# Patient Record
Sex: FEMALE | Race: BLACK OR AFRICAN AMERICAN | URBAN - METROPOLITAN AREA
[De-identification: names, ages, dates, MRNs, and addresses within clinical notes are randomized per-mention and may not be internally consistent; named-entity substitution may affect disease eponyms.]

---

## 2018-08-16 LAB
CREATININE, EXTERNAL: 0.85
HBA1C MFR BLD HPLC: 5.8 %
LDL-C, EXTERNAL: 114
MICROALBUMIN UR TEST STR-MCNC: 11.9 MG/DL

## 2020-12-21 ENCOUNTER — OFFICE VISIT (OUTPATIENT)
Dept: ENDOCRINOLOGY | Age: 65
End: 2020-12-21
Payer: MEDICARE

## 2020-12-21 VITALS
TEMPERATURE: 98.4 F | HEIGHT: 64 IN | RESPIRATION RATE: 14 BRPM | SYSTOLIC BLOOD PRESSURE: 131 MMHG | WEIGHT: 218.3 LBS | OXYGEN SATURATION: 94 % | DIASTOLIC BLOOD PRESSURE: 70 MMHG | BODY MASS INDEX: 37.27 KG/M2 | HEART RATE: 56 BPM

## 2020-12-21 DIAGNOSIS — E66.9 NON MORBID OBESITY: ICD-10-CM

## 2020-12-21 DIAGNOSIS — E89.0 POSTABLATIVE HYPOTHYROIDISM: Primary | ICD-10-CM

## 2020-12-21 PROCEDURE — 99204 OFFICE O/P NEW MOD 45 MIN: CPT | Performed by: INTERNAL MEDICINE

## 2020-12-21 PROCEDURE — 3017F COLORECTAL CA SCREEN DOC REV: CPT | Performed by: INTERNAL MEDICINE

## 2020-12-21 PROCEDURE — G8400 PT W/DXA NO RESULTS DOC: HCPCS | Performed by: INTERNAL MEDICINE

## 2020-12-21 PROCEDURE — 1090F PRES/ABSN URINE INCON ASSESS: CPT | Performed by: INTERNAL MEDICINE

## 2020-12-21 PROCEDURE — 1101F PT FALLS ASSESS-DOCD LE1/YR: CPT | Performed by: INTERNAL MEDICINE

## 2020-12-21 PROCEDURE — G8417 CALC BMI ABV UP PARAM F/U: HCPCS | Performed by: INTERNAL MEDICINE

## 2020-12-21 PROCEDURE — G8427 DOCREV CUR MEDS BY ELIG CLIN: HCPCS | Performed by: INTERNAL MEDICINE

## 2020-12-21 PROCEDURE — G8432 DEP SCR NOT DOC, RNG: HCPCS | Performed by: INTERNAL MEDICINE

## 2020-12-21 PROCEDURE — G8536 NO DOC ELDER MAL SCRN: HCPCS | Performed by: INTERNAL MEDICINE

## 2020-12-21 RX ORDER — METFORMIN HYDROCHLORIDE 500 MG/1
500 TABLET ORAL
COMMUNITY

## 2020-12-21 RX ORDER — LEVOTHYROXINE SODIUM 175 UG/1
175 TABLET ORAL
Qty: 90 TAB | Refills: 3 | Status: SHIPPED | OUTPATIENT
Start: 2020-12-21 | End: 2021-03-24

## 2020-12-21 RX ORDER — LEVOTHYROXINE SODIUM 175 UG/1
175 TABLET ORAL
COMMUNITY
End: 2020-12-21 | Stop reason: ALTCHOICE

## 2020-12-21 RX ORDER — TELMISARTAN AND HYDROCHLORTHIAZIDE 40; 12.5 MG/1; MG/1
1 TABLET ORAL DAILY
COMMUNITY

## 2020-12-21 RX ORDER — LEVOTHYROXINE SODIUM 125 UG/1
TABLET ORAL
COMMUNITY
Start: 2020-11-14 | End: 2020-12-21

## 2020-12-21 NOTE — PROGRESS NOTES
Matthew Carbone MD         Patient Information  Date:12/21/2020  Name : Samara Funez 76 y.o.     YOB: 1955         Referred by: UNKNOWN         History of present illness    Samara Funez is a 76 y.o. female  here for evaluation of thyroid. She was diagnosed with Graves' disease status post radioactive iodine therapy several years ago followed by post ablative hypothyroidism  She was on Synthroid and due to the insurance coverage switched to levothyroxine  She has not taken the medication consistently, now on levothyroxine. TSH in the past was more than 20  Reported weight gain and some tiredness  Sister is a patient of mine        No change in the size of the neck or neck pain. No dysphagia,dysphonia or dyspnea. Wt Readings from Last 3 Encounters:   12/21/20 218 lb 4.8 oz (99 kg)       Past Medical History:   Diagnosis Date    Graves disease        Current Outpatient Medications   Medication Sig    levothyroxine (SYNTHROID) 175 mcg tablet Take 175 mcg by mouth Daily (before breakfast).  telmisartan-hydroCHLOROthiazide (Micardis HCT) 40-12.5 mg per tablet Take 1 Tab by mouth daily.  metFORMIN (GLUCOPHAGE) 500 mg tablet Take 500 mg by mouth daily (with breakfast). No current facility-administered medications for this visit. Not on File      Review of Systems:  All 10 systems  reviewed and are negative other than mentioned in HPI    Physical Examination:  Blood pressure 131/70, pulse (!) 56, temperature 98.4 °F (36.9 °C), temperature source Oral, resp. rate 14, height 5' 3.5\" (1.613 m), weight 218 lb 4.8 oz (99 kg), SpO2 94 %.   - General: pleasant, no distress, good eye contact  - HEENT: no exopthalmos, no periorbital edema, no scleral/conjunctival injection, EOMI, no lid lag or stare  - Neck: supple, no thyromegaly, no nodules,no lymphadenopathy  - Cardiovascular: regular, normal rate, normal S1 and S2,  - Respiratory: clear to auscultation bilaterally  - Gastrointestinal: soft, nontender, nondistended, BS +  - Musculoskeletal: no proximal muscle weakness in upper or lower extremities  - Integumentary: no tremors, no edema,  - Neurological:alert and oriented   - Psychiatric: normal mood and affect    Data Reviewed:     [] Reviewed labs      Assessment/Plan:     1. Postablative hypothyroidism        Primary hypothyroidism/post ablative hypothyroidism  History of Graves' disease  She is dependent on the medicine completely, switch to brand name Unithroid  Good Rx coupons    Discussed the natural course of hypothyroidism and instructions for levothyroxine,compliance to keep the levels stable. Discussed about the various factors which can affect TSH including medications,weight change,illness, compliance and instructions to take LT4 by itself to keep the levels stable. Discussed various causes of weight gain ,association with other autoimmune conditions,stressed the importance of life style changes. Nonmorbid obesity: Portion control, low-carb diet    There are no Patient Instructions on file for this visit. Follow-up and Dispositions    · Return in about 3 months (around 3/21/2021) for labs before next visit and follow up. Patient /caregiver verbalized understanding   Voice-recognition software was used to generate this report, which may result in some phonetic-based errors in the grammar and contents. Even though attempts were made to correct all the mistakes, some may have been missed and remained in the body of the report.

## 2020-12-21 NOTE — PROGRESS NOTES
Glenna Sanders is a 76 y.o. female here for   Chief Complaint   Patient presents with    New Patient    Thyroid Problem       1. Have you been to the ER, urgent care clinic since your last visit? Hospitalized since your last visit? - ER for acid reflux    2. Have you seen or consulted any other health care providers outside of the [address] [place] [address] since your last visit?   Include any pap smears or colon screening- no trying to get records from [place]

## 2020-12-22 PROBLEM — E89.0 POSTABLATIVE HYPOTHYROIDISM: Status: ACTIVE | Noted: 2020-12-22

## 2020-12-22 PROBLEM — E66.9 NON MORBID OBESITY: Status: ACTIVE | Noted: 2020-12-22

## 2021-03-17 ENCOUNTER — HOSPITAL ENCOUNTER (OUTPATIENT)
Dept: LAB | Age: 66
Discharge: HOME | End: 2021-03-17
Payer: MEDICARE

## 2021-03-17 PROCEDURE — 36415 COLL VENOUS BLD VENIPUNCTURE: CPT

## 2021-03-17 PROCEDURE — 84443 ASSAY THYROID STIM HORMONE: CPT

## 2021-03-17 PROCEDURE — 84439 ASSAY OF FREE THYROXINE: CPT

## 2021-03-18 LAB
T4 FREE SERPL-MCNC: 1.9 NG/DL (ref 0.82–1.77)
TSH SERPL DL<=0.005 MIU/L-ACNC: 0.01 UIU/ML (ref 0.45–4.5)

## 2021-03-24 ENCOUNTER — OFFICE VISIT (OUTPATIENT)
Dept: ENDOCRINOLOGY | Age: 66
End: 2021-03-24
Payer: MEDICARE

## 2021-03-24 VITALS
HEART RATE: 58 BPM | RESPIRATION RATE: 18 BRPM | TEMPERATURE: 98.3 F | BODY MASS INDEX: 36.7 KG/M2 | WEIGHT: 215 LBS | OXYGEN SATURATION: 99 % | SYSTOLIC BLOOD PRESSURE: 137 MMHG | DIASTOLIC BLOOD PRESSURE: 55 MMHG | HEIGHT: 64 IN

## 2021-03-24 DIAGNOSIS — E89.0 POSTABLATIVE HYPOTHYROIDISM: ICD-10-CM

## 2021-03-24 DIAGNOSIS — E55.9 VITAMIN D DEFICIENCY: ICD-10-CM

## 2021-03-24 DIAGNOSIS — E89.0 POSTABLATIVE HYPOTHYROIDISM: Primary | ICD-10-CM

## 2021-03-24 PROCEDURE — G8427 DOCREV CUR MEDS BY ELIG CLIN: HCPCS | Performed by: INTERNAL MEDICINE

## 2021-03-24 PROCEDURE — 99214 OFFICE O/P EST MOD 30 MIN: CPT | Performed by: INTERNAL MEDICINE

## 2021-03-24 PROCEDURE — 1090F PRES/ABSN URINE INCON ASSESS: CPT | Performed by: INTERNAL MEDICINE

## 2021-03-24 PROCEDURE — 3017F COLORECTAL CA SCREEN DOC REV: CPT | Performed by: INTERNAL MEDICINE

## 2021-03-24 PROCEDURE — G8536 NO DOC ELDER MAL SCRN: HCPCS | Performed by: INTERNAL MEDICINE

## 2021-03-24 PROCEDURE — G8400 PT W/DXA NO RESULTS DOC: HCPCS | Performed by: INTERNAL MEDICINE

## 2021-03-24 PROCEDURE — G8417 CALC BMI ABV UP PARAM F/U: HCPCS | Performed by: INTERNAL MEDICINE

## 2021-03-24 PROCEDURE — 1101F PT FALLS ASSESS-DOCD LE1/YR: CPT | Performed by: INTERNAL MEDICINE

## 2021-03-24 PROCEDURE — G8510 SCR DEP NEG, NO PLAN REQD: HCPCS | Performed by: INTERNAL MEDICINE

## 2021-03-24 RX ORDER — LEVOTHYROXINE SODIUM 175 UG/1
175 TABLET ORAL
Qty: 90 TAB | Refills: 3 | Status: SHIPPED | OUTPATIENT
Start: 2021-03-24 | End: 2021-03-24 | Stop reason: SDUPTHER

## 2021-03-24 RX ORDER — BISMUTH SUBSALICYLATE 262 MG
1 TABLET,CHEWABLE ORAL DAILY
COMMUNITY

## 2021-03-24 RX ORDER — LEVOTHYROXINE SODIUM 175 UG/1
TABLET ORAL
Qty: 90 TAB | Refills: 3 | Status: SHIPPED | OUTPATIENT
Start: 2021-03-24 | End: 2022-01-26 | Stop reason: SDUPTHER

## 2021-03-24 RX ORDER — ACETAMINOPHEN 325 MG/1
325 TABLET ORAL
COMMUNITY

## 2021-03-24 NOTE — PATIENT INSTRUCTIONS
Daily requirement of calcium is 1000 mg  1200 mg and Vitamin D is 800  1000 Units daily (should  preferably be from food sources). Calcium rich food choices are  low fat dairy products, fortified orange juice,broccoli,salmon. Fall precautions Weight bearing exercises helps. Excess alcohol and smoking weakens the bone and hence should be avoided. Portion calcium (mg) # of servings per week Milk (skim, 2%, or whole) 1 cup 300 Kaplan Milk (Silk brand, Kaplan Breeze Brand)  1 cup 450 Soy milk 1 cup 300 Yogurt  1 cup 350 Tofu with calcium  ½ cup 435 Hard cheese (cheddar, parmesan, emmental, gruyere) 1 oz 240 Soft cheese (camembert, brie, mozzarella) 1 oz 120 Cottage cheese  ½ cup 130 Cream cheese 1oz 180 Calcium supplement or pill 1

## 2021-03-24 NOTE — PROGRESS NOTES
Makayla House MD         Patient Information  Date:3/24/2021  Name : Jenni Garcia 76 y.o.     YOB: 1955         Referred by: UNKNOWN         History of present illness    Jenni Garcia is a 76 y.o. female  here for follow up of thyroid. She was diagnosed with Graves' disease status post radioactive iodine therapy several years ago followed by post ablative hypothyroidism  She was on Synthroid and due to the insurance coverage switched to levothyroxine  She is now on Unithroid, taking it consistently  Labs are normal  She has lost 3 pounds  Sister is a patient of mine  Has more joint aches, prior history of vitamin D deficiency    No change in the size of the neck or neck pain. No dysphagia,dysphonia or dyspnea. Wt Readings from Last 3 Encounters:   03/24/21 215 lb (97.5 kg)   12/21/20 218 lb 4.8 oz (99 kg)       Past Medical History:   Diagnosis Date    Graves disease        Current Outpatient Medications   Medication Sig    acetaminophen (TylenoL) 325 mg tablet Take 325 mg by mouth every four (4) hours as needed for Pain.  multivitamin (ONE A DAY) tablet Take 1 Tab by mouth daily.  CALCIUM PO Twice a week    Unithroid 175 mcg tablet Take 1 Tab by mouth Daily (before breakfast). Brand Medically necessary    telmisartan-hydroCHLOROthiazide (Micardis HCT) 40-12.5 mg per tablet Take 1 Tab by mouth daily.  metFORMIN (GLUCOPHAGE) 500 mg tablet Take 500 mg by mouth daily (with breakfast). No current facility-administered medications for this visit. No Known Allergies      Review of Systems: Per HPI    Physical Examination:  Blood pressure (!) 137/55, pulse (!) 58, temperature 98.3 °F (36.8 °C), temperature source Oral, resp. rate 18, height 5' 3.5\" (1.613 m), weight 215 lb (97.5 kg), SpO2 99 %.   - General: pleasant, no distress, good eye contact  - HEENT: no exopthalmos, no periorbital edema, no scleral/conjunctival injection, EOMI, no lid lag or stare  - Neck: supple, no thyromegaly,  - Cardiovascular: regular, normal rate, normal S1 and S2,  - Respiratory: clear to auscultation bilaterally  -   - Musculoskeletal: no proximal muscle weakness in upper or lower extremities  - Integumentary: no tremors, no edema,  - Neurological:alert and oriented   - Psychiatric: normal mood and affect    Data Reviewed:     [x] Reviewed labs      Assessment/Plan:     1. Postablative hypothyroidism        Primary hypothyroidism/post ablative hypothyroidism  History of Graves' disease  Unithroid, good Rx coupon  Stable levels    Vitamin D deficiency: Supplements/dairy intake    Arthralgia    Nonmorbid obesity: Portion control, low-carb diet    Patient Instructions     Daily requirement of calcium is 1000 mg  1200 mg and Vitamin D is 800  1000 Units daily (should  preferably be from food sources). Calcium rich food choices are  low fat dairy products, fortified orange juice,broccoli,salmon. Fall precautions  Weight bearing exercises helps. Excess alcohol and smoking weakens the bone and hence should be avoided. Portion calcium (mg) # of servings per week   Milk (skim, 2%, or whole) 1 cup 300    Macon Milk (Silk brand, [place] Breeze Brand)  1 cup 450    Soy milk 1 cup 300    Yogurt  1 cup 350          Tofu with calcium  ½ cup 435    Hard cheese (cheddar, parmesan, emmental, gruyere) 1 oz 240    Soft cheese (camembert, brie, mozzarella) 1 oz 120    Cottage cheese  ½ cup 130    Cream cheese 1oz 180    Calcium supplement or pill 1                 Follow-up and Dispositions    · Return in about 1 year (around 3/24/2022) for labs before next visit and follow up. Patient /caregiver verbalized understanding   Voice-recognition software was used to generate this report, which may result in some phonetic-based errors in the grammar and contents.   Even though attempts were made to correct all the mistakes, some may have been missed and remained in the body of the report.

## 2021-03-24 NOTE — PROGRESS NOTES
Zelda Ramirez is a 76 y.o. female here for   Chief Complaint   Patient presents with    Thyroid Problem       1. Have you been to the ER, urgent care clinic since your last visit? Hospitalized since your last visit? -no    2. Have you seen or consulted any other health care providers outside of the [address] [place] [address] since your last visit?   Include any pap smears or colon screening.-no

## 2022-01-26 ENCOUNTER — OFFICE VISIT (OUTPATIENT)
Dept: ENDOCRINOLOGY | Age: 67
End: 2022-01-26
Payer: MEDICARE

## 2022-01-26 VITALS
HEART RATE: 59 BPM | RESPIRATION RATE: 18 BRPM | WEIGHT: 229 LBS | OXYGEN SATURATION: 96 % | DIASTOLIC BLOOD PRESSURE: 70 MMHG | TEMPERATURE: 96.1 F | BODY MASS INDEX: 39.09 KG/M2 | HEIGHT: 64 IN | SYSTOLIC BLOOD PRESSURE: 137 MMHG

## 2022-01-26 DIAGNOSIS — E55.9 VITAMIN D DEFICIENCY: ICD-10-CM

## 2022-01-26 DIAGNOSIS — E89.0 POSTABLATIVE HYPOTHYROIDISM: Primary | ICD-10-CM

## 2022-01-26 DIAGNOSIS — E89.0 POSTABLATIVE HYPOTHYROIDISM: ICD-10-CM

## 2022-01-26 PROCEDURE — G8427 DOCREV CUR MEDS BY ELIG CLIN: HCPCS | Performed by: INTERNAL MEDICINE

## 2022-01-26 PROCEDURE — G8400 PT W/DXA NO RESULTS DOC: HCPCS | Performed by: INTERNAL MEDICINE

## 2022-01-26 PROCEDURE — G8417 CALC BMI ABV UP PARAM F/U: HCPCS | Performed by: INTERNAL MEDICINE

## 2022-01-26 PROCEDURE — G8536 NO DOC ELDER MAL SCRN: HCPCS | Performed by: INTERNAL MEDICINE

## 2022-01-26 PROCEDURE — 3017F COLORECTAL CA SCREEN DOC REV: CPT | Performed by: INTERNAL MEDICINE

## 2022-01-26 PROCEDURE — 1090F PRES/ABSN URINE INCON ASSESS: CPT | Performed by: INTERNAL MEDICINE

## 2022-01-26 PROCEDURE — G8432 DEP SCR NOT DOC, RNG: HCPCS | Performed by: INTERNAL MEDICINE

## 2022-01-26 PROCEDURE — 1101F PT FALLS ASSESS-DOCD LE1/YR: CPT | Performed by: INTERNAL MEDICINE

## 2022-01-26 PROCEDURE — 99214 OFFICE O/P EST MOD 30 MIN: CPT | Performed by: INTERNAL MEDICINE

## 2022-01-26 RX ORDER — LEVOTHYROXINE SODIUM 175 UG/1
TABLET ORAL
Qty: 90 TABLET | Refills: 3 | Status: SHIPPED | OUTPATIENT
Start: 2022-01-26 | End: 2022-02-03 | Stop reason: SDUPTHER

## 2022-01-26 NOTE — PROGRESS NOTES
Sanaz Husbands is a 78 y.o. female here for   Chief Complaint   Patient presents with    Thyroid Problem       1. Have you been to the ER, urgent care clinic since your last visit? Hospitalized since your last visit? -no    2. Have you seen or consulted any other health care providers outside of the [address] [place] [address] since your last visit?   Include any pap smears or colon screening.-no

## 2022-01-26 NOTE — LETTER
1/26/2022    Patient: Neftali Recio   YOB: 1955   Date of Visit: 1/26/2022     Nicola Baker MD  [address] [place] [address]  Via Fax: [phone]    Dear Nicola Baker MD,      Thank you for referring Ms. Neftali Recio to [id number] [place] [address] for evaluation. My notes for this consultation are attached. If you have questions, please do not hesitate to call me. I look forward to following your patient along with you.       Sincerely,    Raven Randhawa MD

## 2022-01-26 NOTE — PROGRESS NOTES
Mau Taylor MD         Patient Information  Date:1/26/2022  Name : Debbie Serna 78 y.o.     YOB: 1955         Referred by: Nelly Hunter MD         History of present illness    Debbie Serna is a 78 y.o. female  here for follow up of thyroid. She was diagnosed with Graves' disease status post radioactive iodine therapy several years ago followed by post ablative hypothyroidism  She was on Synthroid and due to the insurance coverage switched to levothyroxine  She is now on Unithroid, she is getting 20 days supply of tablet at a time due to shortage of supply,  Gained weight  Sister is a patient of mine  Prior history of vitamin D deficiency    No change in the size of the neck or neck pain. No dysphagia,dysphonia or dyspnea. Wt Readings from Last 3 Encounters:   01/26/22 229 lb (103.9 kg)   03/24/21 215 lb (97.5 kg)   12/21/20 218 lb 4.8 oz (99 kg)       Past Medical History:   Diagnosis Date    Graves disease        Current Outpatient Medications   Medication Sig    acetaminophen (TylenoL) 325 mg tablet Take 325 mg by mouth every four (4) hours as needed for Pain.  multivitamin (ONE A DAY) tablet Take 1 Tab by mouth daily.  CALCIUM PO Twice a week    Unithroid 175 mcg tablet Take 1 tab Mon - Sat before breakfast, none on Sundays Brand Medically necessary    telmisartan-hydroCHLOROthiazide (Micardis HCT) 40-12.5 mg per tablet Take 1 Tab by mouth daily.  metFORMIN (GLUCOPHAGE) 500 mg tablet Take 500 mg by mouth daily (with breakfast). No current facility-administered medications for this visit. No Known Allergies      Review of Systems: Per HPI    Physical Examination:  Blood pressure 137/70, pulse (!) 59, temperature (!) 96.1 °F (35.6 °C), temperature source Temporal, resp. rate 18, height 5' 3.5\" (1.613 m), weight 229 lb (103.9 kg), SpO2 96 %.   - General: pleasant, no distress, good eye contact  - HEENT: no exopthalmos, no periorbital edema, no scleral/conjunctival injection, EOMI, no lid lag or stare  - Neck: supple, no thyromegaly,  - Cardiovascular: regular, normal rate, normal S1 and S2,  - Respiratory: clear to auscultation bilaterally  -   - Musculoskeletal: no proximal muscle weakness in upper or lower extremities  - Integumentary: no tremors, no edema,  - Neurological:alert and oriented   - Psychiatric: normal mood and affect    Data Reviewed:     [x] Reviewed labs      Assessment/Plan:     1. Postablative hypothyroidism        Primary hypothyroidism/post ablative hypothyroidism  History of Graves' disease  Unithroid, good Rx coupon  Lab Results   Component Value Date/Time    TSH 0.014 (L) 03/17/2021 08:15 AM         Vitamin D deficiency: Supplements/dairy intake    Arthralgia    Nonmorbid obesity: Portion control, low-carb diet    There are no Patient Instructions on file for this visit. Patient /caregiver verbalized understanding   Voice-recognition software was used to generate this report, which may result in some phonetic-based errors in the grammar and contents. Even though attempts were made to correct all the mistakes, some may have been missed and remained in the body of the report.

## 2022-02-02 LAB
25(OH)D3+25(OH)D2 SERPL-MCNC: 28.1 NG/ML (ref 30–100)
T4 FREE SERPL-MCNC: <0.1 NG/DL (ref 0.82–1.77)
TSH SERPL DL<=0.005 MIU/L-ACNC: 21.5 UIU/ML (ref 0.45–4.5)

## 2022-02-02 NOTE — PROGRESS NOTES
Thyroid test is off, but since she was not getting the medication consistently that could have changed the levels.   Change Unithroid to 1 tablet every day including Sunday

## 2022-02-03 ENCOUNTER — TELEPHONE (OUTPATIENT)
Dept: ENDOCRINOLOGY | Age: 67
End: 2022-02-03

## 2022-02-03 DIAGNOSIS — E89.0 POSTABLATIVE HYPOTHYROIDISM: ICD-10-CM

## 2022-02-03 RX ORDER — LEVOTHYROXINE SODIUM 175 UG/1
TABLET ORAL
Qty: 90 TABLET | Refills: 3 | Status: SHIPPED | OUTPATIENT
Start: 2022-02-03 | End: 2022-07-26 | Stop reason: ALTCHOICE

## 2022-02-03 NOTE — TELEPHONE ENCOUNTER
Per Dr. Ana Vazquez, informed pt of result note, as noted above. Pt verbalized understanding with no further questions or concerns at this time.

## 2022-02-03 NOTE — TELEPHONE ENCOUNTER
----- Message from Emi Howard MD sent at 2/2/2022  5:34 PM EST -----  Thyroid test is off, but since she was not getting the medication consistently that could have changed the levels.   Change Unithroid to 1 tablet every day including Sunday

## 2022-03-19 PROBLEM — E89.0 POSTABLATIVE HYPOTHYROIDISM: Status: ACTIVE | Noted: 2020-12-22

## 2022-03-20 PROBLEM — E66.9 NON MORBID OBESITY: Status: ACTIVE | Noted: 2020-12-22

## 2022-07-20 LAB
T4 FREE SERPL-MCNC: 2.17 NG/DL (ref 0.82–1.77)
TSH SERPL DL<=0.005 MIU/L-ACNC: <0.005 UIU/ML (ref 0.45–4.5)

## 2022-07-26 ENCOUNTER — OFFICE VISIT (OUTPATIENT)
Dept: ENDOCRINOLOGY | Age: 67
End: 2022-07-26
Payer: MEDICARE

## 2022-07-26 VITALS
SYSTOLIC BLOOD PRESSURE: 107 MMHG | RESPIRATION RATE: 18 BRPM | TEMPERATURE: 96.9 F | WEIGHT: 223.52 LBS | HEART RATE: 80 BPM | DIASTOLIC BLOOD PRESSURE: 55 MMHG | BODY MASS INDEX: 38.16 KG/M2 | OXYGEN SATURATION: 98 % | HEIGHT: 64 IN

## 2022-07-26 DIAGNOSIS — E05.80 IATROGENIC HYPERTHYROIDISM: ICD-10-CM

## 2022-07-26 DIAGNOSIS — E89.0 POSTABLATIVE HYPOTHYROIDISM: Primary | ICD-10-CM

## 2022-07-26 DIAGNOSIS — E66.9 NON MORBID OBESITY: ICD-10-CM

## 2022-07-26 PROCEDURE — G8427 DOCREV CUR MEDS BY ELIG CLIN: HCPCS | Performed by: INTERNAL MEDICINE

## 2022-07-26 PROCEDURE — 1101F PT FALLS ASSESS-DOCD LE1/YR: CPT | Performed by: INTERNAL MEDICINE

## 2022-07-26 PROCEDURE — G8510 SCR DEP NEG, NO PLAN REQD: HCPCS | Performed by: INTERNAL MEDICINE

## 2022-07-26 PROCEDURE — 99214 OFFICE O/P EST MOD 30 MIN: CPT | Performed by: INTERNAL MEDICINE

## 2022-07-26 PROCEDURE — G8417 CALC BMI ABV UP PARAM F/U: HCPCS | Performed by: INTERNAL MEDICINE

## 2022-07-26 PROCEDURE — G8400 PT W/DXA NO RESULTS DOC: HCPCS | Performed by: INTERNAL MEDICINE

## 2022-07-26 PROCEDURE — G8536 NO DOC ELDER MAL SCRN: HCPCS | Performed by: INTERNAL MEDICINE

## 2022-07-26 PROCEDURE — 1090F PRES/ABSN URINE INCON ASSESS: CPT | Performed by: INTERNAL MEDICINE

## 2022-07-26 PROCEDURE — 1123F ACP DISCUSS/DSCN MKR DOCD: CPT | Performed by: INTERNAL MEDICINE

## 2022-07-26 PROCEDURE — 3017F COLORECTAL CA SCREEN DOC REV: CPT | Performed by: INTERNAL MEDICINE

## 2022-07-26 RX ORDER — LEVOTHYROXINE SODIUM 175 UG/1
TABLET ORAL
Qty: 90 TABLET | Refills: 3 | Status: SHIPPED | OUTPATIENT
Start: 2022-07-26 | End: 2022-08-01 | Stop reason: SDUPTHER

## 2022-07-26 NOTE — PROGRESS NOTES
Harris Doherty MD         Patient Information  Date:7/26/2022  Name : Yong Rodriguez 75 y.o.     YOB: 1955         Referred by: Baron Devan MD         History of present illness    Yong Rodriguez is a 75 y.o. female  here for follow up of thyroid. She was diagnosed with Graves' disease status post radioactive iodine therapy several years ago followed by post ablative hypothyroidism  She was on Synthroid and due to the insurance coverage switched to levothyroxine  She was prescribed Unithroid, indicated as dispense as written, however pharmacy is changing it to levothyroxine, last prescription was levothyroxine  Called pharmacy and discussed  Thyroid function tests are off  No hyperthyroid symptoms  Prior history of vitamin D deficiency    No change in the size of the neck or neck pain. No dysphagia,dysphonia or dyspnea. Wt Readings from Last 3 Encounters:   07/26/22 223 lb 8.3 oz (101.4 kg)   01/26/22 229 lb (103.9 kg)   03/24/21 215 lb (97.5 kg)       Past Medical History:   Diagnosis Date    Graves disease        Current Outpatient Medications   Medication Sig    Unithroid 175 mcg tablet Take 1 tab every day. Brand Medically necessary    acetaminophen (TYLENOL) 325 mg tablet Take 325 mg by mouth every four (4) hours as needed for Pain.    multivitamin (ONE A DAY) tablet Take 1 Tab by mouth daily. CALCIUM PO Twice a week    telmisartan-hydroCHLOROthiazide (MICARDIS HCT) 40-12.5 mg per tablet Take 1 Tab by mouth daily. metFORMIN (GLUCOPHAGE) 500 mg tablet Take 500 mg by mouth daily (with breakfast). No current facility-administered medications for this visit. No Known Allergies      Review of Systems: Per HPI    Physical Examination:  Blood pressure (!) 107/55, pulse 80, temperature 96.9 °F (36.1 °C), temperature source Temporal, resp. rate 18, height 5' 3.5\" (1.613 m), weight 223 lb 8.3 oz (101.4 kg), SpO2 98 %.   General: pleasant, no distress, good eye contact  HEENT: no exopthalmos, no periorbital edema, no scleral/conjunctival injection, EOMI, no lid lag or stare  Neck: supple, no thyromegaly,  Cardiovascular: regular, normal rate, normal S1 and S2,  Respiratory: clear to auscultation bilaterally    Musculoskeletal: no proximal muscle weakness in upper or lower extremities  Integumentary: no tremors, no edema,  Neurological:alert and oriented   Psychiatric: normal mood and affect    Data Reviewed:     [x] Reviewed labs      Assessment/Plan:     1. Postablative hypothyroidism        Primary hypothyroidism/post ablative hypothyroidism  History of Graves' disease  Unithroid, good Rx coupon  Lab Results   Component Value Date/Time    TSH <0.005 (L) 07/19/2022 03:07 PM     Thyroid function test widely fluctuating, discussed instructions, pharmacy is also switching medications from brand to generic  Discussed with the pharmacist  Unithroid 1 tablet Monday through Saturday, none on Sunday      Vitamin D deficiency: Supplements/dairy intake    Arthralgia    Nonmorbid obesity: Portion control, low-carb diet    There are no Patient Instructions on file for this visit. Patient /caregiver verbalized understanding   Voice-recognition software was used to generate this report, which may result in some phonetic-based errors in the grammar and contents. Even though attempts were made to correct all the mistakes, some may have been missed and remained in the body of the report.

## 2022-07-26 NOTE — LETTER
7/26/2022    Patient: Lucille Mckeon   YOB: 1955   Date of Visit: 7/26/2022     Nalini Santizo MD  [address] [address]  Via Fax: [phone]    Dear Nalini Santizo MD,      Thank you for referring Ms. Lucille Mckeon to [id number] [place] [address] for evaluation. My notes for this consultation are attached. If you have questions, please do not hesitate to call me. I look forward to following your patient along with you.       Sincerely,    Sisi Santiago MD

## 2022-07-26 NOTE — PROGRESS NOTES
1. Have you been to the ER, urgent care clinic since your last visit? No Hospitalized since your last visit? No    2. Have you seen or consulted any other health care providers outside of the [address] [place] [address] since your last visit? Include any pap smears or colon screening.  No    Wt Readings from Last 3 Encounters:   07/26/22 223 lb 8.3 oz (101.4 kg)   01/26/22 229 lb (103.9 kg)   03/24/21 215 lb (97.5 kg)     Temp Readings from Last 3 Encounters:   07/26/22 96.9 °F (36.1 °C) (Temporal)   01/26/22 (!) 96.1 °F (35.6 °C) (Temporal)   03/24/21 98.3 °F (36.8 °C) (Oral)     BP Readings from Last 3 Encounters:   07/26/22 (!) 107/55   01/26/22 137/70   03/24/21 (!) 137/55     Pulse Readings from Last 3 Encounters:   07/26/22 80   01/26/22 (!) 59   03/24/21 (!) 58

## 2022-08-01 DIAGNOSIS — E89.0 POSTABLATIVE HYPOTHYROIDISM: ICD-10-CM

## 2022-08-01 RX ORDER — LEVOTHYROXINE SODIUM 175 UG/1
TABLET ORAL
Qty: 90 TABLET | Refills: 3 | Status: SHIPPED | OUTPATIENT
Start: 2022-08-01

## 2022-08-01 NOTE — TELEPHONE ENCOUNTER
Pt states she talked with her pharmacy and they state they did not receive the request for her unithroid. Please advise if need more info.  DIDI

## 2022-10-05 LAB
T4 FREE SERPL-MCNC: 1.51 NG/DL (ref 0.82–1.77)
TSH SERPL DL<=0.005 MIU/L-ACNC: 0.1 UIU/ML (ref 0.45–4.5)

## 2022-10-06 ENCOUNTER — TELEPHONE (OUTPATIENT)
Dept: ENDOCRINOLOGY | Age: 67
End: 2022-10-06

## 2022-10-06 NOTE — TELEPHONE ENCOUNTER
----- Message from Rosemary Boyd MD sent at 10/5/2022  4:59 PM EDT -----  Inform pt that thyroid labs are getting better - no change in med dosing

## 2022-10-06 NOTE — TELEPHONE ENCOUNTER
Pt stated it took 3 weeks for her to get her meds and usually she only takes it 6 days a week but for the past 4 weeks she was taking it 7 days a week. Pt wants to verify whether to take it 6 days a week or 7 days a week now.

## 2022-10-06 NOTE — TELEPHONE ENCOUNTER
6 days a week is better as numbers got corrected quickly if taken 7 days a week for only a month       Cristina Hayes MD

## 2022-10-06 NOTE — TELEPHONE ENCOUNTER
Informed pt of Dr. Palmira Birmingham note. Pt verbalized understanding with no further questions or concerns at this time.

## 2023-01-24 ENCOUNTER — OFFICE VISIT (OUTPATIENT)
Dept: ENDOCRINOLOGY | Age: 68
End: 2023-01-24
Payer: MEDICARE

## 2023-01-24 VITALS
TEMPERATURE: 97.8 F | DIASTOLIC BLOOD PRESSURE: 75 MMHG | SYSTOLIC BLOOD PRESSURE: 141 MMHG | HEIGHT: 64 IN | WEIGHT: 231.06 LBS | BODY MASS INDEX: 39.45 KG/M2 | OXYGEN SATURATION: 98 % | HEART RATE: 67 BPM

## 2023-01-24 DIAGNOSIS — E55.9 VITAMIN D DEFICIENCY: ICD-10-CM

## 2023-01-24 DIAGNOSIS — E89.0 POSTABLATIVE HYPOTHYROIDISM: Primary | ICD-10-CM

## 2023-01-24 PROCEDURE — G8400 PT W/DXA NO RESULTS DOC: HCPCS | Performed by: INTERNAL MEDICINE

## 2023-01-24 PROCEDURE — 99214 OFFICE O/P EST MOD 30 MIN: CPT | Performed by: INTERNAL MEDICINE

## 2023-01-24 PROCEDURE — 1101F PT FALLS ASSESS-DOCD LE1/YR: CPT | Performed by: INTERNAL MEDICINE

## 2023-01-24 PROCEDURE — G8417 CALC BMI ABV UP PARAM F/U: HCPCS | Performed by: INTERNAL MEDICINE

## 2023-01-24 PROCEDURE — G8510 SCR DEP NEG, NO PLAN REQD: HCPCS | Performed by: INTERNAL MEDICINE

## 2023-01-24 PROCEDURE — 1123F ACP DISCUSS/DSCN MKR DOCD: CPT | Performed by: INTERNAL MEDICINE

## 2023-01-24 PROCEDURE — 1090F PRES/ABSN URINE INCON ASSESS: CPT | Performed by: INTERNAL MEDICINE

## 2023-01-24 PROCEDURE — G8427 DOCREV CUR MEDS BY ELIG CLIN: HCPCS | Performed by: INTERNAL MEDICINE

## 2023-01-24 PROCEDURE — G8536 NO DOC ELDER MAL SCRN: HCPCS | Performed by: INTERNAL MEDICINE

## 2023-01-24 PROCEDURE — 3017F COLORECTAL CA SCREEN DOC REV: CPT | Performed by: INTERNAL MEDICINE

## 2023-01-24 NOTE — PROGRESS NOTES
Charon Sicard ,MD         Patient Information  Date:1/24/2023  Name : Kanchan Rudd 75 y.o.     YOB: 1955         Referred by: Alvina Rubio MD         History of present illness    Kanchan Rudd is a 75 y.o. female  here for follow up of thyroid. 1/24/23  Eats yogurt daily  Taking Unithroid brand consistently  No diarrhea  No tachycardia   No shakiness/nervousness  No lump in throat/neck  Some acid reflux    Prior history  She was diagnosed with Graves' disease status post radioactive iodine therapy several years ago followed by post ablative hypothyroidism  She was on Synthroid and due to the insurance coverage switched to levothyroxine  She was prescribed Unithroid, indicated as dispense as written, however pharmacy is changing it to levothyroxine, last prescription was levothyroxine  Called pharmacy and discussed  Thyroid function tests are off  No hyperthyroid symptoms  Prior history of vitamin D deficiency    No change in the size of the neck or neck pain. No dysphagia,dysphonia or dyspnea. Wt Readings from Last 3 Encounters:   01/24/23 231 lb 1 oz (104.8 kg)   07/26/22 223 lb 8.3 oz (101.4 kg)   01/26/22 229 lb (103.9 kg)       Past Medical History:   Diagnosis Date    Graves disease        Current Outpatient Medications   Medication Sig    Unithroid 175 mcg tablet Take 1 tab Mon - Sat before breakfast, none on Sundays Brand Medically necessary    acetaminophen (TYLENOL) 325 mg tablet Take 325 mg by mouth every four (4) hours as needed for Pain.    multivitamin (ONE A DAY) tablet Take 1 Tab by mouth daily. CALCIUM PO Only takes sometimes per pt .    telmisartan-hydroCHLOROthiazide (MICARDIS HCT) 40-12.5 mg per tablet Take 1 Tab by mouth daily. metFORMIN (GLUCOPHAGE) 500 mg tablet Take 500 mg by mouth daily (with breakfast). No current facility-administered medications for this visit.      No Known Allergies      Review of Systems: Per HPI    Physical Examination:  Blood pressure (!) 141/75, pulse 67, temperature 97.8 °F (36.6 °C), temperature source Temporal, height 5' 3.5\" (1.613 m), weight 231 lb 1 oz (104.8 kg), SpO2 98 %. General: pleasant, no distress, good eye contact  HEENT: no exopthalmos, no periorbital edema, no scleral/conjunctival injection, EOMI, no lid lag or stare  Neck: supple, no thyromegaly,  Cardiovascular: regular, normal rate, normal S1 and S2,  Respiratory: clear to auscultation bilaterally    Musculoskeletal: no proximal muscle weakness in upper or lower extremities  Integumentary: no tremors, no edema,  Neurological:alert and oriented   Psychiatric: normal mood and affect    Data Reviewed:     [x] Reviewed labs      Assessment/Plan:     1. Postablative hypothyroidism    2. Vitamin D deficiency          Primary hypothyroidism/post ablative hypothyroidism  History of Graves' disease  Unithroid, good Rx coupon  Lab Results   Component Value Date/Time    TSH 0.105 (L) 10/04/2022 10:53 AM     Thyroid function test widely fluctuates, discussed instructions, pharmacy is also switching medications from brand to generic, now getting brand Unithroid  Discussed with the pharmacist  Unithroid 1 tablet Monday through Saturday, none on Sunday  TSH is minimally off, no change in the dose. Continue the same dose  No hyperadrenergic symptoms      Vitamin D deficiency: Supplements/dairy intake    Arthralgia    Nonmorbid obesity: Portion control, low-carb diet    There are no Patient Instructions on file for this visit. Follow-up and Dispositions    Return in about 9 months (around 10/24/2023) for labs before next visit and follow up. Patient /caregiver verbalized understanding   Voice-recognition software was used to generate this report, which may result in some phonetic-based errors in the grammar and contents.   Even though attempts were made to correct all the mistakes, some may have been missed and remained in the body of the report.

## 2023-01-24 NOTE — LETTER
1/24/2023    Patient: Yumiko Watters   YOB: 1955   Date of Visit: 1/24/2023     Kori Fernando MD  [address] [address]  Via Fax: [phone]    Dear Kori Fernando MD,      Thank you for referring Ms. Yumiko Watters to [address] [address] for evaluation. My notes for this consultation are attached. If you have questions, please do not hesitate to call me. I look forward to following your patient along with you.       Sincerely,    Porter Lamb MD

## 2023-01-24 NOTE — PROGRESS NOTES
Bonita Keyes is a 75 y.o. female here for   Chief Complaint   Patient presents with    Thyroid Problem       1. Have you been to the ER, urgent care clinic since your last visit? Hospitalized since your last visit? - No     2. Have you seen or consulted any other health care providers outside of the [address] [place] [address] since your last visit?   Include any pap smears or colon screening.- No

## 2023-04-22 DIAGNOSIS — E55.9 VITAMIN D DEFICIENCY: ICD-10-CM

## 2023-04-22 DIAGNOSIS — E89.0 POSTABLATIVE HYPOTHYROIDISM: Primary | ICD-10-CM

## 2023-04-23 DIAGNOSIS — E89.0 POSTABLATIVE HYPOTHYROIDISM: Primary | ICD-10-CM

## 2023-04-23 DIAGNOSIS — E55.9 VITAMIN D DEFICIENCY: ICD-10-CM

## 2023-04-24 DIAGNOSIS — E55.9 VITAMIN D DEFICIENCY: ICD-10-CM

## 2023-04-24 DIAGNOSIS — E89.0 POSTABLATIVE HYPOTHYROIDISM: Primary | ICD-10-CM

## 2023-10-11 DIAGNOSIS — E89.0 POSTPROCEDURAL HYPOTHYROIDISM: ICD-10-CM

## 2023-10-12 RX ORDER — LEVOTHYROXINE SODIUM 175 UG/1
TABLET ORAL
Qty: 78 TABLET | Refills: 3 | Status: SHIPPED | OUTPATIENT
Start: 2023-10-12

## 2023-11-29 ENCOUNTER — HOSPITAL ENCOUNTER (OUTPATIENT)
Facility: HOSPITAL | Age: 68
Discharge: HOME | End: 2023-12-02
Payer: MEDICARE

## 2023-11-29 VITALS — WEIGHT: 231 LBS | BODY MASS INDEX: 40.93 KG/M2 | HEIGHT: 63 IN

## 2023-11-29 DIAGNOSIS — Z12.31 ENCOUNTER FOR SCREENING MAMMOGRAM FOR MALIGNANT NEOPLASM OF BREAST: ICD-10-CM

## 2023-11-29 PROCEDURE — 77063 BREAST TOMOSYNTHESIS BI: CPT

## 2023-12-11 ENCOUNTER — TELEPHONE (OUTPATIENT)
Age: 68
End: 2023-12-11

## 2023-12-11 DIAGNOSIS — E89.0 POSTPROCEDURAL HYPOTHYROIDISM: Primary | ICD-10-CM

## 2023-12-11 NOTE — TELEPHONE ENCOUNTER
Danielle, from Dr. Lyly Escamilla's office, called and said they sent over new labs for this patient and it may indicate too much medication. Should you have any questions please call [phone]

## 2023-12-12 NOTE — TELEPHONE ENCOUNTER
Received results from PCP. Per Dr. Gomez called pt and confirmed she is taking the Unithroid 175 mcg as prescribed and skipping Sundays. Advised pt I will let Dr. Gomez know and call back with further instructions.

## 2023-12-12 NOTE — TELEPHONE ENCOUNTER
Unithroid 150 mcg, stop 175 mcg  1 tablet Monday through Saturday, skip on Sunday  Labs in 2 months, TSH, free T4, follow-up in 2 months  She did not show for her last appointment

## 2023-12-13 RX ORDER — LEVOTHYROXINE SODIUM 150 UG/1
TABLET ORAL
Qty: 90 TABLET | Refills: 3 | Status: SHIPPED | OUTPATIENT
Start: 2023-12-13

## 2023-12-13 NOTE — TELEPHONE ENCOUNTER
Informed pt of Dr. Gomez's note. Pt verbalized understanding with no further questions or concerns at this time. Transferred pt to  to schedule. Labs slips mailed.

## 2024-02-09 LAB
T4 FREE SERPL-MCNC: 1.59 NG/DL (ref 0.82–1.77)
TSH SERPL DL<=0.005 MIU/L-ACNC: 0.18 UIU/ML (ref 0.45–4.5)

## 2024-02-14 ENCOUNTER — OFFICE VISIT (OUTPATIENT)
Age: 69
End: 2024-02-14

## 2024-02-14 VITALS
SYSTOLIC BLOOD PRESSURE: 150 MMHG | HEIGHT: 64 IN | TEMPERATURE: 98.2 F | OXYGEN SATURATION: 96 % | BODY MASS INDEX: 41.83 KG/M2 | DIASTOLIC BLOOD PRESSURE: 81 MMHG | RESPIRATION RATE: 18 BRPM | HEART RATE: 66 BPM | WEIGHT: 245 LBS

## 2024-02-14 DIAGNOSIS — E55.9 VITAMIN D DEFICIENCY, UNSPECIFIED: ICD-10-CM

## 2024-02-14 DIAGNOSIS — E03.9 PRIMARY HYPOTHYROIDISM: Primary | ICD-10-CM

## 2024-02-14 RX ORDER — LEVOCETIRIZINE DIHYDROCHLORIDE 5 MG/1
5 TABLET, FILM COATED ORAL DAILY
COMMUNITY

## 2024-02-14 NOTE — PROGRESS NOTES
[facility] DIAB[facility]ES [facility] ENDOCRINOLOGY                 Yudith Gomez MD             Patient Information   Date:1/24/2023   Name : April Mayo 67 y.o.       YOB: 1955           Referred by: Lyly Bowman MD         Chief Complaint   Patient presents with    Thyroid Problem           History of present illness      April Mayo is   here for follow up of thyroid.   She was diagnosed with Graves' disease status post radioactive iodine therapy several years ago followed by post ablative hypothyroidism   She was on Synthroid and due to the insurance coverage switched to levothyroxine  Later on switched to Unithroid     Taking Unithroid brand consistently   No diarrhea   No tachycardia    No shakiness/nervousness   No lump in throat/neck   Some acid reflux            Physical Examination:      General: pleasant, no distress, good eye contact    HEENT: no exopthalmos, no periorbital edema, no scleral/conjunctival injection, EOMI, no lid lag or stare    Neck: supple, no thyromegaly,    Cardiovascular: regular, normal rate, normal S1 and S2,    Respiratory: clear to auscultation bilaterally        Musculoskeletal: no proximal muscle weakness in upper or lower extremities    Integumentary: no tremors, no edema,    Neurological:alert and oriented     Psychiatric: normal mood and affect      Data Reviewed:       [x] Reviewed labs          Lab Results   Component Value Date    TSH 0.177 (L) 02/07/2024    T4FREE 1.59 02/07/2024       No results found for: \"TRAB\", \"TSI\", \"TSILT\", \"TPO\"      Assessment/Plan:          Primary hypothyroidism/post ablative hypothyroidism   History of Graves' disease   Unithroid, 150 mcg, Monday through Saturday, none on Sunday          Thyroid function test widely fluctuates, discussed instructions, pharmacy is also switching medications from brand to generic, now getting brand Unithroid    No hyperadrenergic symptoms        Vitamin D deficiency:

## 2024-02-14 NOTE — PROGRESS NOTES
April Mayo is a 68 y.o. female here for   Chief Complaint   Patient presents with    Thyroid Problem       1. Have you been to the ER, urgent care clinic since your last visit?  Hospitalized since your last visit? -  COPD- [facility] Sep 2023    2. Have you seen or consulted any other health care providers outside of the [facility] since your last visit?  Include any pap smears or colon screening.- PCP

## 2024-03-13 ENCOUNTER — OFFICE VISIT (OUTPATIENT)
Age: 69
End: 2024-03-13

## 2024-03-13 ENCOUNTER — APPOINTMENT (OUTPATIENT)
Facility: HOSPITAL | Age: 69
DRG: 012 | End: 2024-03-13
Payer: MEDICARE

## 2024-03-13 ENCOUNTER — HOSPITAL ENCOUNTER (INPATIENT)
Facility: HOSPITAL | Age: 69
LOS: 19 days | Discharge: NURSING, REHAB OR LONG-TERM CARE | DRG: 012 | End: 2024-04-01
Attending: EMERGENCY MEDICINE | Admitting: FAMILY MEDICINE
Payer: MEDICARE

## 2024-03-13 VITALS
SYSTOLIC BLOOD PRESSURE: 138 MMHG | WEIGHT: 245 LBS | BODY MASS INDEX: 41.83 KG/M2 | OXYGEN SATURATION: 97 % | HEIGHT: 64 IN | DIASTOLIC BLOOD PRESSURE: 88 MMHG | HEART RATE: 75 BPM

## 2024-03-13 DIAGNOSIS — G47.33 OSA (OBSTRUCTIVE SLEEP APNEA): ICD-10-CM

## 2024-03-13 DIAGNOSIS — D38.0 NEOPLASM OF UNCERTAIN BEHAVIOR OF VOCAL CORD: ICD-10-CM

## 2024-03-13 DIAGNOSIS — J38.3 VOCAL CORD MASS: Primary | ICD-10-CM

## 2024-03-13 DIAGNOSIS — R49.0 DYSPHONIA: ICD-10-CM

## 2024-03-13 DIAGNOSIS — E66.9 NON MORBID OBESITY: ICD-10-CM

## 2024-03-13 DIAGNOSIS — J38.7 LARYNGEAL MASS: ICD-10-CM

## 2024-03-13 DIAGNOSIS — J44.9 CHRONIC OBSTRUCTIVE PULMONARY DISEASE, UNSPECIFIED COPD TYPE (HCC): ICD-10-CM

## 2024-03-13 DIAGNOSIS — R49.0 DYSPHONIA: Primary | ICD-10-CM

## 2024-03-13 LAB
ALBUMIN SERPL-MCNC: 3.4 G/DL (ref 3.5–5)
ALBUMIN/GLOB SERPL: 0.6 (ref 1.1–2.2)
ALP SERPL-CCNC: 82 U/L (ref 45–117)
ALT SERPL-CCNC: 23 U/L (ref 12–78)
ANION GAP SERPL CALC-SCNC: 6 MMOL/L (ref 5–15)
AST SERPL W P-5'-P-CCNC: 37 U/L (ref 15–37)
BASOPHILS # BLD: 0 K/UL (ref 0–0.1)
BASOPHILS NFR BLD: 1 % (ref 0–1)
BILIRUB SERPL-MCNC: 0.4 MG/DL (ref 0.2–1)
BUN SERPL-MCNC: 14 MG/DL (ref 6–20)
BUN/CREAT SERPL: 18 (ref 12–20)
CA-I BLD-MCNC: 8.6 MG/DL (ref 8.5–10.1)
CHLORIDE SERPL-SCNC: 110 MMOL/L (ref 97–108)
CO2 SERPL-SCNC: 23 MMOL/L (ref 21–32)
CREAT SERPL-MCNC: 0.76 MG/DL (ref 0.55–1.02)
DIFFERENTIAL METHOD BLD: NORMAL
EOSINOPHIL # BLD: 0.1 K/UL (ref 0–0.4)
EOSINOPHIL NFR BLD: 2 % (ref 0–7)
ERYTHROCYTE [DISTWIDTH] IN BLOOD BY AUTOMATED COUNT: 13.1 % (ref 11.5–14.5)
GLOBULIN SER CALC-MCNC: 5.4 G/DL (ref 2–4)
GLUCOSE SERPL-MCNC: 117 MG/DL (ref 65–100)
HCT VFR BLD AUTO: 40.4 % (ref 35–47)
HGB BLD-MCNC: 13.1 G/DL (ref 11.5–16)
IMM GRANULOCYTES # BLD AUTO: 0 K/UL (ref 0–0.04)
IMM GRANULOCYTES NFR BLD AUTO: 0 % (ref 0–0.5)
LYMPHOCYTES # BLD: 2.5 K/UL (ref 0.8–3.5)
LYMPHOCYTES NFR BLD: 33 % (ref 12–49)
MCH RBC QN AUTO: 29.5 PG (ref 26–34)
MCHC RBC AUTO-ENTMCNC: 32.4 G/DL (ref 30–36.5)
MCV RBC AUTO: 91 FL (ref 80–99)
MONOCYTES # BLD: 0.7 K/UL (ref 0–1)
MONOCYTES NFR BLD: 9 % (ref 5–13)
NEUTS SEG # BLD: 4 K/UL (ref 1.8–8)
NEUTS SEG NFR BLD: 55 % (ref 32–75)
NRBC # BLD: 0 K/UL (ref 0–0.01)
NRBC BLD-RTO: 0 PER 100 WBC
PLATELET # BLD AUTO: 267 K/UL (ref 150–400)
PMV BLD AUTO: 10.3 FL (ref 8.9–12.9)
POTASSIUM SERPL-SCNC: 3.7 MMOL/L (ref 3.5–5.1)
PROT SERPL-MCNC: 8.8 G/DL (ref 6.4–8.2)
RBC # BLD AUTO: 4.44 M/UL (ref 3.8–5.2)
SODIUM SERPL-SCNC: 139 MMOL/L (ref 136–145)
TROPONIN I SERPL HS-MCNC: 6 NG/L (ref 0–51)
WBC # BLD AUTO: 7.4 K/UL (ref 3.6–11)

## 2024-03-13 PROCEDURE — 2000000000 HC ICU R&B

## 2024-03-13 PROCEDURE — 84484 ASSAY OF TROPONIN QUANT: CPT

## 2024-03-13 PROCEDURE — 80053 COMPREHEN METABOLIC PANEL: CPT

## 2024-03-13 PROCEDURE — 99285 EMERGENCY DEPT VISIT HI MDM: CPT

## 2024-03-13 PROCEDURE — 6360000002 HC RX W HCPCS: Performed by: EMERGENCY MEDICINE

## 2024-03-13 PROCEDURE — 96374 THER/PROPH/DIAG INJ IV PUSH: CPT

## 2024-03-13 PROCEDURE — 70491 CT SOFT TISSUE NECK W/DYE: CPT

## 2024-03-13 PROCEDURE — 93005 ELECTROCARDIOGRAM TRACING: CPT | Performed by: EMERGENCY MEDICINE

## 2024-03-13 PROCEDURE — 36415 COLL VENOUS BLD VENIPUNCTURE: CPT

## 2024-03-13 PROCEDURE — 6360000004 HC RX CONTRAST MEDICATION: Performed by: FAMILY MEDICINE

## 2024-03-13 PROCEDURE — 85025 COMPLETE CBC W/AUTO DIFF WBC: CPT

## 2024-03-13 RX ORDER — TELMISARTAN AND HYDROCHLORTHIAZIDE 40; 12.5 MG/1; MG/1
1 TABLET ORAL DAILY
Status: DISCONTINUED | OUTPATIENT
Start: 2024-03-14 | End: 2024-03-14

## 2024-03-13 RX ORDER — SODIUM CHLORIDE 9 MG/ML
INJECTION, SOLUTION INTRAVENOUS PRN
Status: DISCONTINUED | OUTPATIENT
Start: 2024-03-13 | End: 2024-04-01 | Stop reason: HOSPADM

## 2024-03-13 RX ORDER — ENOXAPARIN SODIUM 100 MG/ML
30 INJECTION SUBCUTANEOUS 2 TIMES DAILY
Status: DISCONTINUED | OUTPATIENT
Start: 2024-03-13 | End: 2024-04-01 | Stop reason: HOSPADM

## 2024-03-13 RX ORDER — POTASSIUM CHLORIDE 20 MEQ/1
40 TABLET, EXTENDED RELEASE ORAL PRN
Status: DISCONTINUED | OUTPATIENT
Start: 2024-03-13 | End: 2024-04-01 | Stop reason: HOSPADM

## 2024-03-13 RX ORDER — IPRATROPIUM BROMIDE AND ALBUTEROL SULFATE 2.5; .5 MG/3ML; MG/3ML
1 SOLUTION RESPIRATORY (INHALATION)
Status: DISCONTINUED | OUTPATIENT
Start: 2024-03-14 | End: 2024-03-14

## 2024-03-13 RX ORDER — SODIUM CHLORIDE 0.9 % (FLUSH) 0.9 %
5-40 SYRINGE (ML) INJECTION EVERY 12 HOURS SCHEDULED
Status: DISCONTINUED | OUTPATIENT
Start: 2024-03-13 | End: 2024-04-01 | Stop reason: HOSPADM

## 2024-03-13 RX ORDER — DEXAMETHASONE SODIUM PHOSPHATE 10 MG/ML
10 INJECTION, SOLUTION INTRAMUSCULAR; INTRAVENOUS ONCE
Status: COMPLETED | OUTPATIENT
Start: 2024-03-13 | End: 2024-03-13

## 2024-03-13 RX ORDER — POLYETHYLENE GLYCOL 3350 17 G/17G
17 POWDER, FOR SOLUTION ORAL DAILY PRN
Status: DISCONTINUED | OUTPATIENT
Start: 2024-03-13 | End: 2024-04-01 | Stop reason: HOSPADM

## 2024-03-13 RX ORDER — SODIUM CHLORIDE 0.9 % (FLUSH) 0.9 %
5-40 SYRINGE (ML) INJECTION PRN
Status: DISCONTINUED | OUTPATIENT
Start: 2024-03-13 | End: 2024-04-01 | Stop reason: HOSPADM

## 2024-03-13 RX ORDER — ACETAMINOPHEN 650 MG/1
650 SUPPOSITORY RECTAL EVERY 6 HOURS PRN
Status: DISCONTINUED | OUTPATIENT
Start: 2024-03-13 | End: 2024-04-01 | Stop reason: HOSPADM

## 2024-03-13 RX ORDER — POTASSIUM CHLORIDE 7.45 MG/ML
10 INJECTION INTRAVENOUS PRN
Status: DISCONTINUED | OUTPATIENT
Start: 2024-03-13 | End: 2024-04-01 | Stop reason: HOSPADM

## 2024-03-13 RX ORDER — CETIRIZINE HYDROCHLORIDE 10 MG/1
5 TABLET ORAL DAILY
Status: DISCONTINUED | OUTPATIENT
Start: 2024-03-14 | End: 2024-04-01 | Stop reason: HOSPADM

## 2024-03-13 RX ORDER — LEVOTHYROXINE SODIUM 0.07 MG/1
150 TABLET ORAL DAILY
Status: DISCONTINUED | OUTPATIENT
Start: 2024-03-14 | End: 2024-04-01 | Stop reason: HOSPADM

## 2024-03-13 RX ORDER — SODIUM CHLORIDE 9 MG/ML
INJECTION, SOLUTION INTRAVENOUS CONTINUOUS
Status: DISCONTINUED | OUTPATIENT
Start: 2024-03-13 | End: 2024-03-13

## 2024-03-13 RX ORDER — MAGNESIUM SULFATE IN WATER 40 MG/ML
2000 INJECTION, SOLUTION INTRAVENOUS PRN
Status: DISCONTINUED | OUTPATIENT
Start: 2024-03-13 | End: 2024-04-01 | Stop reason: HOSPADM

## 2024-03-13 RX ORDER — ONDANSETRON 2 MG/ML
4 INJECTION INTRAMUSCULAR; INTRAVENOUS EVERY 6 HOURS PRN
Status: DISCONTINUED | OUTPATIENT
Start: 2024-03-13 | End: 2024-04-01 | Stop reason: HOSPADM

## 2024-03-13 RX ORDER — ACETAMINOPHEN 325 MG/1
650 TABLET ORAL EVERY 6 HOURS PRN
Status: DISCONTINUED | OUTPATIENT
Start: 2024-03-13 | End: 2024-03-14

## 2024-03-13 RX ORDER — METHYLPREDNISOLONE SODIUM SUCCINATE 40 MG/ML
40 INJECTION, POWDER, LYOPHILIZED, FOR SOLUTION INTRAMUSCULAR; INTRAVENOUS EVERY 6 HOURS
Status: DISCONTINUED | OUTPATIENT
Start: 2024-03-13 | End: 2024-03-14

## 2024-03-13 RX ORDER — ONDANSETRON 4 MG/1
4 TABLET, ORALLY DISINTEGRATING ORAL EVERY 8 HOURS PRN
Status: DISCONTINUED | OUTPATIENT
Start: 2024-03-13 | End: 2024-04-01 | Stop reason: HOSPADM

## 2024-03-13 RX ADMIN — IOPAMIDOL 100 ML: 755 INJECTION, SOLUTION INTRAVENOUS at 23:26

## 2024-03-13 RX ADMIN — DEXAMETHASONE SODIUM PHOSPHATE 10 MG: 10 INJECTION INTRAMUSCULAR; INTRAVENOUS at 21:57

## 2024-03-13 ASSESSMENT — PAIN - FUNCTIONAL ASSESSMENT: PAIN_FUNCTIONAL_ASSESSMENT: NONE - DENIES PAIN

## 2024-03-13 NOTE — PROGRESS NOTES
Subjective:   April Mayo   68 y.o.   1955     Refered by: Darek Moscoso Md  [address] [address]     New Patient Visit  Chief Compliant: Dysphonia     History of Present Illness:  April Mayo is a 68 y.o. female with past medical history of arthritis, graves disease s/p ablation, HTN, who was referred from Dr. Darek Moscoso for evaluation of dysphonia.     Patient was diagnosed with COPD 6 months ago and was started on an inhaler.  Since then patient has noted worsening dysphonia.  Denies any dysphagia or stridor.  Endorses shortness of breath.  Patient is a former smoker, less than 1 pack a day, but has been smoking for a long time.  Recently quit.    Additionally patient is concerned about her obstructive sleep apnea.  Had a home sleep test done, but it was inconclusive.      Patient follows with Dr. Gomez for Graves' disease status post ablation.  TSH is still suppressed but T4 is within normal limits    Review of Systems  Consitutional: denies fever, excessive weight gain or loss.  Eyes: denies diplopia, eye pain.  Integumentary: denies new concerning skin lesions.  Ears, Nose, Mouth, Throat: denies except as per HPI.  Endocrine: denies hot or cold intolerance, increased thirst.  Respiratory: denies cough, hemoptysis, wheezing  Gastrointestinal: denies trouble swallowing, nausea, emesis, regurgitation  Musculoskeletal: denies muscle weakness or wasting  Cardiovascular: denies chest pain, shortness of breath  Neurologic: denies seizures, numbness or tingling, syncope  Hematologic: denies easy bleeding or bruising       Past Medical History:   Diagnosis Date    Arthritis     Graves disease     Hypertension      Past Surgical History:   Procedure Laterality Date    BUNIONECTOMY      HYSTERECTOMY (CERVIX STATUS UNKNOWN)      HYSTERECTOMY, VAGINAL      OTHER SURGICAL HISTORY      eye surgery for graves disease      Family History   Problem Relation Age of Onset

## 2024-03-14 ENCOUNTER — ANESTHESIA EVENT (OUTPATIENT)
Facility: HOSPITAL | Age: 69
End: 2024-03-14
Payer: MEDICARE

## 2024-03-14 ENCOUNTER — ANESTHESIA (OUTPATIENT)
Facility: HOSPITAL | Age: 69
End: 2024-03-14
Payer: MEDICARE

## 2024-03-14 ENCOUNTER — APPOINTMENT (OUTPATIENT)
Facility: HOSPITAL | Age: 69
DRG: 012 | End: 2024-03-14
Payer: MEDICARE

## 2024-03-14 PROBLEM — D38.0 NEOPLASM OF UNCERTAIN BEHAVIOR OF VOCAL CORD: Status: ACTIVE | Noted: 2024-03-14

## 2024-03-14 PROBLEM — J38.7 LARYNGEAL MASS: Status: ACTIVE | Noted: 2024-03-13

## 2024-03-14 LAB
ALBUMIN SERPL-MCNC: 3.2 G/DL (ref 3.5–5)
ALBUMIN/GLOB SERPL: 0.6 (ref 1.1–2.2)
ALP SERPL-CCNC: 79 U/L (ref 45–117)
ALT SERPL-CCNC: 22 U/L (ref 12–78)
ANION GAP SERPL CALC-SCNC: 5 MMOL/L (ref 5–15)
AST SERPL W P-5'-P-CCNC: ABNORMAL U/L (ref 15–37)
BASOPHILS # BLD: 0 K/UL (ref 0–0.1)
BASOPHILS NFR BLD: 0 % (ref 0–1)
BILIRUB SERPL-MCNC: 0.5 MG/DL (ref 0.2–1)
BUN SERPL-MCNC: 11 MG/DL (ref 6–20)
BUN/CREAT SERPL: 18 (ref 12–20)
CA-I BLD-MCNC: 8.7 MG/DL (ref 8.5–10.1)
CHLORIDE SERPL-SCNC: 108 MMOL/L (ref 97–108)
CO2 SERPL-SCNC: 23 MMOL/L (ref 21–32)
CREAT SERPL-MCNC: 0.61 MG/DL (ref 0.55–1.02)
DIFFERENTIAL METHOD BLD: ABNORMAL
EKG ATRIAL RATE: 63 BPM
EKG DIAGNOSIS: NORMAL
EKG P AXIS: 48 DEGREES
EKG P-R INTERVAL: 136 MS
EKG Q-T INTERVAL: 422 MS
EKG QRS DURATION: 80 MS
EKG QTC CALCULATION (BAZETT): 431 MS
EKG R AXIS: 9 DEGREES
EKG T AXIS: 52 DEGREES
EKG VENTRICULAR RATE: 63 BPM
EOSINOPHIL # BLD: 0 K/UL (ref 0–0.4)
EOSINOPHIL NFR BLD: 0 % (ref 0–7)
ERYTHROCYTE [DISTWIDTH] IN BLOOD BY AUTOMATED COUNT: 12.8 % (ref 11.5–14.5)
GLOBULIN SER CALC-MCNC: 5 G/DL (ref 2–4)
GLUCOSE BLD STRIP.AUTO-MCNC: 108 MG/DL (ref 65–100)
GLUCOSE SERPL-MCNC: 136 MG/DL (ref 65–100)
HCT VFR BLD AUTO: 42.1 % (ref 35–47)
HGB BLD-MCNC: 13.7 G/DL (ref 11.5–16)
IMM GRANULOCYTES # BLD AUTO: 0.1 K/UL (ref 0–0.04)
IMM GRANULOCYTES NFR BLD AUTO: 1 % (ref 0–0.5)
LYMPHOCYTES # BLD: 1.5 K/UL (ref 0.8–3.5)
LYMPHOCYTES NFR BLD: 11 % (ref 12–49)
MCH RBC QN AUTO: 29.5 PG (ref 26–34)
MCHC RBC AUTO-ENTMCNC: 32.5 G/DL (ref 30–36.5)
MCV RBC AUTO: 90.5 FL (ref 80–99)
MONOCYTES # BLD: 0.8 K/UL (ref 0–1)
MONOCYTES NFR BLD: 6 % (ref 5–13)
NEUTS SEG # BLD: 10.6 K/UL (ref 1.8–8)
NEUTS SEG NFR BLD: 82 % (ref 32–75)
NRBC # BLD: 0 K/UL (ref 0–0.01)
NRBC BLD-RTO: 0 PER 100 WBC
PERFORMED BY:: ABNORMAL
PLATELET # BLD AUTO: 279 K/UL (ref 150–400)
PMV BLD AUTO: 10.2 FL (ref 8.9–12.9)
POTASSIUM SERPL-SCNC: 3.7 MMOL/L (ref 3.5–5.1)
POTASSIUM SERPL-SCNC: ABNORMAL MMOL/L (ref 3.5–5.1)
PROT SERPL-MCNC: 8.2 G/DL (ref 6.4–8.2)
RBC # BLD AUTO: 4.65 M/UL (ref 3.8–5.2)
SODIUM SERPL-SCNC: 136 MMOL/L (ref 136–145)
WBC # BLD AUTO: 13 K/UL (ref 3.6–11)

## 2024-03-14 PROCEDURE — 31603 EMER TRACHEOSTOMY TTRACH: CPT | Performed by: STUDENT IN AN ORGANIZED HEALTH CARE EDUCATION/TRAINING PROGRAM

## 2024-03-14 PROCEDURE — 3700000001 HC ADD 15 MINUTES (ANESTHESIA): Performed by: STUDENT IN AN ORGANIZED HEALTH CARE EDUCATION/TRAINING PROGRAM

## 2024-03-14 PROCEDURE — 6360000002 HC RX W HCPCS: Performed by: INTERNAL MEDICINE

## 2024-03-14 PROCEDURE — 2580000003 HC RX 258: Performed by: NURSE ANESTHETIST, CERTIFIED REGISTERED

## 2024-03-14 PROCEDURE — 71045 X-RAY EXAM CHEST 1 VIEW: CPT

## 2024-03-14 PROCEDURE — 2700000000 HC OXYGEN THERAPY PER DAY

## 2024-03-14 PROCEDURE — 84132 ASSAY OF SERUM POTASSIUM: CPT

## 2024-03-14 PROCEDURE — 2500000003 HC RX 250 WO HCPCS: Performed by: NURSE ANESTHETIST, CERTIFIED REGISTERED

## 2024-03-14 PROCEDURE — 2500000003 HC RX 250 WO HCPCS: Performed by: INTERNAL MEDICINE

## 2024-03-14 PROCEDURE — 6360000002 HC RX W HCPCS: Performed by: FAMILY MEDICINE

## 2024-03-14 PROCEDURE — 2000000000 HC ICU R&B

## 2024-03-14 PROCEDURE — 3700000000 HC ANESTHESIA ATTENDED CARE: Performed by: STUDENT IN AN ORGANIZED HEALTH CARE EDUCATION/TRAINING PROGRAM

## 2024-03-14 PROCEDURE — 6370000000 HC RX 637 (ALT 250 FOR IP): Performed by: INTERNAL MEDICINE

## 2024-03-14 PROCEDURE — 31535 LARYNGOSCOPY W/BIOPSY: CPT | Performed by: STUDENT IN AN ORGANIZED HEALTH CARE EDUCATION/TRAINING PROGRAM

## 2024-03-14 PROCEDURE — 2500000003 HC RX 250 WO HCPCS: Performed by: STUDENT IN AN ORGANIZED HEALTH CARE EDUCATION/TRAINING PROGRAM

## 2024-03-14 PROCEDURE — 6370000000 HC RX 637 (ALT 250 FOR IP): Performed by: FAMILY MEDICINE

## 2024-03-14 PROCEDURE — 94761 N-INVAS EAR/PLS OXIMETRY MLT: CPT

## 2024-03-14 PROCEDURE — 2580000003 HC RX 258: Performed by: FAMILY MEDICINE

## 2024-03-14 PROCEDURE — 6360000002 HC RX W HCPCS: Performed by: NURSE ANESTHETIST, CERTIFIED REGISTERED

## 2024-03-14 PROCEDURE — 92610 EVALUATE SWALLOWING FUNCTION: CPT

## 2024-03-14 PROCEDURE — 88305 TISSUE EXAM BY PATHOLOGIST: CPT

## 2024-03-14 PROCEDURE — 3600000012 HC SURGERY LEVEL 2 ADDTL 15MIN: Performed by: STUDENT IN AN ORGANIZED HEALTH CARE EDUCATION/TRAINING PROGRAM

## 2024-03-14 PROCEDURE — 2709999900 HC NON-CHARGEABLE SUPPLY: Performed by: STUDENT IN AN ORGANIZED HEALTH CARE EDUCATION/TRAINING PROGRAM

## 2024-03-14 PROCEDURE — 0CBT7ZX EXCISION OF RIGHT VOCAL CORD, VIA NATURAL OR ARTIFICIAL OPENING, DIAGNOSTIC: ICD-10-PCS | Performed by: STUDENT IN AN ORGANIZED HEALTH CARE EDUCATION/TRAINING PROGRAM

## 2024-03-14 PROCEDURE — 80053 COMPREHEN METABOLIC PANEL: CPT

## 2024-03-14 PROCEDURE — 82962 GLUCOSE BLOOD TEST: CPT

## 2024-03-14 PROCEDURE — 85025 COMPLETE CBC W/AUTO DIFF WBC: CPT

## 2024-03-14 PROCEDURE — 36415 COLL VENOUS BLD VENIPUNCTURE: CPT

## 2024-03-14 PROCEDURE — 0B110F4 BYPASS TRACHEA TO CUTANEOUS WITH TRACHEOSTOMY DEVICE, OPEN APPROACH: ICD-10-PCS | Performed by: STUDENT IN AN ORGANIZED HEALTH CARE EDUCATION/TRAINING PROGRAM

## 2024-03-14 PROCEDURE — 3600000002 HC SURGERY LEVEL 2 BASE: Performed by: STUDENT IN AN ORGANIZED HEALTH CARE EDUCATION/TRAINING PROGRAM

## 2024-03-14 PROCEDURE — 99223 1ST HOSP IP/OBS HIGH 75: CPT | Performed by: STUDENT IN AN ORGANIZED HEALTH CARE EDUCATION/TRAINING PROGRAM

## 2024-03-14 PROCEDURE — 94640 AIRWAY INHALATION TREATMENT: CPT

## 2024-03-14 RX ORDER — BENZONATATE 100 MG/1
200 CAPSULE ORAL EVERY 8 HOURS
Status: DISCONTINUED | OUTPATIENT
Start: 2024-03-14 | End: 2024-03-16

## 2024-03-14 RX ORDER — LOSARTAN POTASSIUM 50 MG/1
50 TABLET ORAL DAILY
Status: DISCONTINUED | OUTPATIENT
Start: 2024-03-14 | End: 2024-04-01 | Stop reason: HOSPADM

## 2024-03-14 RX ORDER — SODIUM CHLORIDE, SODIUM LACTATE, POTASSIUM CHLORIDE, CALCIUM CHLORIDE 600; 310; 30; 20 MG/100ML; MG/100ML; MG/100ML; MG/100ML
INJECTION, SOLUTION INTRAVENOUS CONTINUOUS PRN
Status: DISCONTINUED | OUTPATIENT
Start: 2024-03-14 | End: 2024-03-14 | Stop reason: SDUPTHER

## 2024-03-14 RX ORDER — ESMOLOL HYDROCHLORIDE 10 MG/ML
INJECTION INTRAVENOUS PRN
Status: DISCONTINUED | OUTPATIENT
Start: 2024-03-14 | End: 2024-03-14 | Stop reason: SDUPTHER

## 2024-03-14 RX ORDER — PROPOFOL 10 MG/ML
INJECTION, EMULSION INTRAVENOUS PRN
Status: DISCONTINUED | OUTPATIENT
Start: 2024-03-14 | End: 2024-03-14 | Stop reason: SDUPTHER

## 2024-03-14 RX ORDER — DEXAMETHASONE SODIUM PHOSPHATE 4 MG/ML
INJECTION, SOLUTION INTRA-ARTICULAR; INTRALESIONAL; INTRAMUSCULAR; INTRAVENOUS; SOFT TISSUE PRN
Status: DISCONTINUED | OUTPATIENT
Start: 2024-03-14 | End: 2024-03-14 | Stop reason: SDUPTHER

## 2024-03-14 RX ORDER — ACETAMINOPHEN 160 MG/5ML
650 LIQUID ORAL EVERY 6 HOURS PRN
Status: DISCONTINUED | OUTPATIENT
Start: 2024-03-14 | End: 2024-04-01 | Stop reason: HOSPADM

## 2024-03-14 RX ORDER — HYDRALAZINE HYDROCHLORIDE 20 MG/ML
10 INJECTION INTRAMUSCULAR; INTRAVENOUS EVERY 4 HOURS PRN
Status: DISCONTINUED | OUTPATIENT
Start: 2024-03-14 | End: 2024-04-01 | Stop reason: HOSPADM

## 2024-03-14 RX ORDER — BUDESONIDE AND FORMOTEROL FUMARATE DIHYDRATE 160; 4.5 UG/1; UG/1
2 AEROSOL RESPIRATORY (INHALATION)
Status: DISCONTINUED | OUTPATIENT
Start: 2024-03-14 | End: 2024-04-01 | Stop reason: HOSPADM

## 2024-03-14 RX ORDER — ONDANSETRON 2 MG/ML
INJECTION INTRAMUSCULAR; INTRAVENOUS PRN
Status: DISCONTINUED | OUTPATIENT
Start: 2024-03-14 | End: 2024-03-14 | Stop reason: SDUPTHER

## 2024-03-14 RX ORDER — LIDOCAINE HYDROCHLORIDE AND EPINEPHRINE 10; 10 MG/ML; UG/ML
INJECTION, SOLUTION INFILTRATION; PERINEURAL PRN
Status: DISCONTINUED | OUTPATIENT
Start: 2024-03-14 | End: 2024-03-14 | Stop reason: ALTCHOICE

## 2024-03-14 RX ORDER — DEXMEDETOMIDINE HYDROCHLORIDE 100 UG/ML
INJECTION, SOLUTION INTRAVENOUS PRN
Status: DISCONTINUED | OUTPATIENT
Start: 2024-03-14 | End: 2024-03-14 | Stop reason: SDUPTHER

## 2024-03-14 RX ORDER — CEFAZOLIN SODIUM 1 G/3ML
INJECTION, POWDER, FOR SOLUTION INTRAMUSCULAR; INTRAVENOUS PRN
Status: DISCONTINUED | OUTPATIENT
Start: 2024-03-14 | End: 2024-03-14 | Stop reason: SDUPTHER

## 2024-03-14 RX ORDER — ROCURONIUM BROMIDE 10 MG/ML
INJECTION, SOLUTION INTRAVENOUS PRN
Status: DISCONTINUED | OUTPATIENT
Start: 2024-03-14 | End: 2024-03-14 | Stop reason: SDUPTHER

## 2024-03-14 RX ORDER — LIDOCAINE HYDROCHLORIDE 20 MG/ML
5 INJECTION, SOLUTION EPIDURAL; INFILTRATION; INTRACAUDAL; PERINEURAL ONCE
Status: COMPLETED | OUTPATIENT
Start: 2024-03-14 | End: 2024-03-14

## 2024-03-14 RX ORDER — DEXTROMETHORPHAN POLISTIREX 30 MG/5ML
30 SUSPENSION ORAL EVERY 12 HOURS SCHEDULED
Status: DISCONTINUED | OUTPATIENT
Start: 2024-03-14 | End: 2024-03-16

## 2024-03-14 RX ORDER — MORPHINE SULFATE 2 MG/ML
2 INJECTION, SOLUTION INTRAMUSCULAR; INTRAVENOUS EVERY 4 HOURS PRN
Status: DISPENSED | OUTPATIENT
Start: 2024-03-14 | End: 2024-03-17

## 2024-03-14 RX ADMIN — CETIRIZINE HYDROCHLORIDE 5 MG: 10 TABLET, FILM COATED ORAL at 16:44

## 2024-03-14 RX ADMIN — MORPHINE SULFATE 2 MG: 2 INJECTION, SOLUTION INTRAMUSCULAR; INTRAVENOUS at 10:11

## 2024-03-14 RX ADMIN — Medication 30 MG: at 12:19

## 2024-03-14 RX ADMIN — ENOXAPARIN SODIUM 30 MG: 100 INJECTION SUBCUTANEOUS at 21:06

## 2024-03-14 RX ADMIN — BENZONATATE 200 MG: 100 CAPSULE ORAL at 12:19

## 2024-03-14 RX ADMIN — SUGAMMADEX 200 MG: 100 INJECTION, SOLUTION INTRAVENOUS at 08:54

## 2024-03-14 RX ADMIN — LIDOCAINE HYDROCHLORIDE 5 ML: 20 INJECTION, SOLUTION EPIDURAL; INFILTRATION; INTRACAUDAL; PERINEURAL at 10:40

## 2024-03-14 RX ADMIN — LOSARTAN POTASSIUM 50 MG: 50 TABLET, FILM COATED ORAL at 16:44

## 2024-03-14 RX ADMIN — DEXAMETHASONE SODIUM PHOSPHATE 4 MG: 4 INJECTION, SOLUTION INTRAMUSCULAR; INTRAVENOUS at 08:54

## 2024-03-14 RX ADMIN — ACETAMINOPHEN 650 MG: 650 SOLUTION ORAL at 19:44

## 2024-03-14 RX ADMIN — DEXMEDETOMIDINE 8 MCG: 100 INJECTION, SOLUTION INTRAVENOUS at 07:53

## 2024-03-14 RX ADMIN — ROCURONIUM BROMIDE 20 MG: 10 INJECTION, SOLUTION INTRAVENOUS at 08:38

## 2024-03-14 RX ADMIN — SODIUM CHLORIDE, POTASSIUM CHLORIDE, SODIUM LACTATE AND CALCIUM CHLORIDE: 600; 310; 30; 20 INJECTION, SOLUTION INTRAVENOUS at 07:53

## 2024-03-14 RX ADMIN — IPRATROPIUM BROMIDE AND ALBUTEROL SULFATE 1 DOSE: .5; 2.5 SOLUTION RESPIRATORY (INHALATION) at 10:41

## 2024-03-14 RX ADMIN — ROCURONIUM BROMIDE 10 MG: 10 INJECTION, SOLUTION INTRAVENOUS at 08:46

## 2024-03-14 RX ADMIN — BENZONATATE 200 MG: 100 CAPSULE ORAL at 19:44

## 2024-03-14 RX ADMIN — CEFAZOLIN 2 G: 1 INJECTION, POWDER, FOR SOLUTION INTRAMUSCULAR; INTRAVENOUS at 07:53

## 2024-03-14 RX ADMIN — PROPOFOL 50 MG: 10 INJECTION, EMULSION INTRAVENOUS at 08:46

## 2024-03-14 RX ADMIN — HYDRALAZINE HYDROCHLORIDE 10 MG: 20 INJECTION INTRAMUSCULAR; INTRAVENOUS at 12:07

## 2024-03-14 RX ADMIN — DEXMEDETOMIDINE 4 MCG: 100 INJECTION, SOLUTION INTRAVENOUS at 08:17

## 2024-03-14 RX ADMIN — DEXMEDETOMIDINE 8 MCG: 100 INJECTION, SOLUTION INTRAVENOUS at 08:09

## 2024-03-14 RX ADMIN — ESMOLOL HYDROCHLORIDE 30 MG: 10 INJECTION, SOLUTION INTRAVENOUS at 08:44

## 2024-03-14 RX ADMIN — DEXMEDETOMIDINE 8 MCG: 100 INJECTION, SOLUTION INTRAVENOUS at 08:02

## 2024-03-14 RX ADMIN — SODIUM CHLORIDE, PRESERVATIVE FREE 10 ML: 5 INJECTION INTRAVENOUS at 21:00

## 2024-03-14 RX ADMIN — SODIUM CHLORIDE, PRESERVATIVE FREE 10 ML: 5 INJECTION INTRAVENOUS at 11:41

## 2024-03-14 RX ADMIN — DEXMEDETOMIDINE 4 MCG: 100 INJECTION, SOLUTION INTRAVENOUS at 08:26

## 2024-03-14 RX ADMIN — Medication 30 MG: at 21:06

## 2024-03-14 RX ADMIN — ONDANSETRON 4 MG: 2 INJECTION INTRAMUSCULAR; INTRAVENOUS at 08:54

## 2024-03-14 RX ADMIN — METHYLPREDNISOLONE SODIUM SUCCINATE 40 MG: 40 INJECTION INTRAMUSCULAR; INTRAVENOUS at 05:17

## 2024-03-14 RX ADMIN — PROPOFOL 30 MG: 10 INJECTION, EMULSION INTRAVENOUS at 08:34

## 2024-03-14 RX ADMIN — MORPHINE SULFATE 2 MG: 2 INJECTION, SOLUTION INTRAMUSCULAR; INTRAVENOUS at 14:12

## 2024-03-14 ASSESSMENT — PAIN SCALES - GENERAL
PAINLEVEL_OUTOF10: 10
PAINLEVEL_OUTOF10: 7
PAINLEVEL_OUTOF10: 6
PAINLEVEL_OUTOF10: 6
PAINLEVEL_OUTOF10: 0
PAINLEVEL_OUTOF10: 2

## 2024-03-14 ASSESSMENT — PAIN DESCRIPTION - LOCATION
LOCATION: THROAT
LOCATION: THROAT;HEAD
LOCATION: THROAT
LOCATION: THROAT

## 2024-03-14 ASSESSMENT — PAIN SCALES - WONG BAKER
WONGBAKER_NUMERICALRESPONSE: HURTS WHOLE LOT
WONGBAKER_NUMERICALRESPONSE: NO HURT
WONGBAKER_NUMERICALRESPONSE: NO HURT

## 2024-03-14 NOTE — ANESTHESIA POSTPROCEDURE EVALUATION
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      (FAUSTO (obstructive sleep apnea) [G47.33])      (Dysphonia [R49.0])      (Chronic obstructive pulmonary disease, unspecified COPD type (HCC) [J44.9])      (Laryngeal mass [J38.7])      (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  Hydration status: euvolemic  Pain management: adequate    No notable events documented.

## 2024-03-14 NOTE — OP NOTE
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]    Post-Op Diagnosis: Same       Procedure(s):  AWAKE URGENT TRACHEOSTOMY  DIRECT LARYNGOSCOPY AND BIOPSY     Surgeon(s):  Ramona Goldman MD Vakil, Mayand P, MD    Assistant:   * No surgical staff found *    Anesthesia: Local    Estimated Blood Loss (mL): Minimal    Complications: None    Specimens:   ID Type Source Tests Collected by Time Destination   1 : RIGHT VOCAL CHORD BIOPSY Tissue Larynx SURGICAL PATHOLOGY Vaishali Pringle MD 3/14/2024 0838        Implants:  * No implants in log *      Drains: * No LDAs found *    Findings:   1) 6.0 cuffed Shiley tracheostomy positioned between 2-3 tracheal rings   2) 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  Due to the obstructive nature of this patient's tumor she was kept awake for the tracheostomy portion of the case.  Patient was given light sedation with Precedex.  Patient was placed with the neck extended with a shoulder roll.  The patient's neck was then prepped and draped in the usual sterile fashion with Betadine.  I injected the proposed incision site with 10 mL of 1% lidocaine with 1-100,000 parts epinephrine.  A limited horizontal incision was made just below the cricoid cartilage.  Using Bovie cautery and blunt dissection, the platysma was divided and the midline raphe identified. This was  along the midline between the strap muscles until the thyroid isthmus is encountered.  Thyroid

## 2024-03-14 NOTE — ED PROVIDER NOTES
Hemoglobin 13.1 11.5 - 16.0 g/dL    Hematocrit 40.4 35.0 - 47.0 %    MCV 91.0 80.0 - 99.0 FL    MCH 29.5 26.0 - 34.0 PG    MCHC 32.4 30.0 - 36.5 g/dL    RDW 13.1 11.5 - 14.5 %    Platelets 267 150 - 400 K/uL    MPV 10.3 8.9 - 12.9 FL    Nucleated RBCs 0.0 0.0  WBC    nRBC 0.00 0.00 - 0.01 K/uL    Neutrophils % 55 32 - 75 %    Lymphocytes % 33 12 - 49 %    Monocytes % 9 5 - 13 %    Eosinophils % 2 0 - 7 %    Basophils % 1 0 - 1 %    Immature Granulocytes 0 0 - 0.5 %    Neutrophils Absolute 4.0 1.8 - 8.0 K/UL    Lymphocytes Absolute 2.5 0.8 - 3.5 K/UL    Monocytes Absolute 0.7 0.0 - 1.0 K/UL    Eosinophils Absolute 0.1 0.0 - 0.4 K/UL    Basophils Absolute 0.0 0.0 - 0.1 K/UL    Absolute Immature Granulocyte 0.0 0.00 - 0.04 K/UL    Differential Type AUTOMATED     Comprehensive Metabolic Panel    Collection Time: 03/13/24  9:56 PM   Result Value Ref Range    Sodium 139 136 - 145 mmol/L    Potassium 3.7 3.5 - 5.1 mmol/L    Chloride 110 (H) 97 - 108 mmol/L    CO2 23 21 - 32 mmol/L    Anion Gap 6 5 - 15 mmol/L    Glucose 117 (H) 65 - 100 mg/dL    BUN 14 6 - 20 mg/dL    Creatinine 0.76 0.55 - 1.02 mg/dL    Bun/Cre Ratio 18 12 - 20      Est, Glom Filt Rate >60 >60 ml/min/1.73m2    Calcium 8.6 8.5 - 10.1 mg/dL    Total Bilirubin 0.4 0.2 - 1.0 mg/dL    AST 37 15 - 37 U/L    ALT 23 12 - 78 U/L    Alk Phosphatase 82 45 - 117 U/L    Total Protein 8.8 (H) 6.4 - 8.2 g/dL    Albumin 3.4 (L) 3.5 - 5.0 g/dL    Globulin 5.4 (H) 2.0 - 4.0 g/dL    Albumin/Globulin Ratio 0.6 (L) 1.1 - 2.2         EKG: Initial EKG interpreted by me shows:     Radiologic Studies:  Non-plain film images such as CT, ultrasound and MRI are read by the radiologist.   Plain radiographic images are visualized and preliminarily interpreted by me, the ED Provider, with the following findings:   Not applicable.    Interpretation per the Radiologist below, if available at the time of this note:  CT SOFT TISSUE NECK W CONTRAST    (Results Pending)      EMERGENCY

## 2024-03-14 NOTE — BRIEF OP NOTE
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]    Post-Op Diagnosis: Same       Procedure(s):  AWAKE URGENT TRACHEOSTOMY AND BIOPSY (LOCAL WITH ANES STANDBY)    Surgeon(s):  Ramona Goldman MD Vakil, Mayand P, MD    Assistant:  * No surgical staff found *    Anesthesia: Local    Estimated Blood Loss (mL): Minimal    Complications: None    Specimens:   ID Type Source Tests Collected by Time Destination   1 : RIGHT VOCAL CHORD BIOPSY Tissue Larynx SURGICAL PATHOLOGY Vaishali Pringle MD 3/14/2024 0838        Implants:  * No implants in log *      Drains: * No LDAs found *    Findings:   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

## 2024-03-14 NOTE — ANESTHESIA PRE PROCEDURE
\"PROTIME\", \"INR\", \"APTT\"    HCG (If Applicable): No results found for: \"PREGTESTUR\", \"PREGSERUM\", \"HCG\", \"HCGQUANT\"     ABGs: No results found for: \"PHART\", \"PO2ART\", \"YCZ1VAG\", \"QSX0KBG\", \"BEART\", \"L0OOOZIJ\"     Type & Screen (If Applicable):  No results found for: \"LABABO\", \"LABRH\"    Drug/Infectious Status (If Applicable):  No results found for: \"HIV\", \"HEPCAB\"    COVID-19 Screening (If Applicable): No results found for: \"COVID19\"        Anesthesia Evaluation  Patient summary reviewed and Nursing notes reviewed  Airway: Mallampati: III     Neck ROM: full  Mouth opening: > = 3 FB   Dental: normal exam   (+) poor dentition      Pulmonary:Negative Pulmonary ROS and normal exam  breath sounds clear to auscultation  (+)     sleep apnea:                                 ROS comment: Dyphonia, Neck mass, one side vocal cord paralysis.   Cardiovascular:    (+) hypertension:        Rhythm: regular  Rate: normal                    Neuro/Psych:   Negative Neuro/Psych ROS              GI/Hepatic/Renal:   (+) morbid obesity          Endo/Other:    (+) hypothyroidism::..                 Abdominal:             Vascular: negative vascular ROS.         Other Findings:       Anesthesia Plan      general and MAC     ASA 3 - emergent     (Standard ASA monitors: continuous EKG, BP, HR, pulse oximeter, temperature, and capnography.  ENT wants to proceed with awake tach first then GA and biopsy. )  Induction: intravenous.    MIPS: Postoperative opioids intended and Prophylactic antiemetics administered.  Anesthetic plan and risks discussed with patient (and family, if present.).      Plan discussed with CRNA.                Richelle Perez MD   3/14/2024

## 2024-03-14 NOTE — CARE COORDINATION
03/14/24 1158   Service Assessment   Patient Orientation Alert and Oriented   Cognition Alert   History Provided By Child/Family   Primary Caregiver Self   Support Systems Children;Family Members;Friends/Neighbors   Patient's Healthcare Decision Maker is: Legal Next of Kin   PCP Verified by CM Yes   Last Visit to PCP Within last 3 months   Prior Functional Level Independent in ADLs/IADLs   Current Functional Level Independent in ADLs/IADLs   Can patient return to prior living arrangement Yes   Ability to make needs known: Unable   Family able to assist with home care needs: Yes   Would you like for me to discuss the discharge plan with any other family members/significant others, and if so, who? Yes   Financial Resources None   Community Resources None   Social/Functional History   Lives With Daughter;Family   Type of Home House   ADL Assistance Independent   Ambulation Assistance Independent   Transfer Assistance Independent   Occupation Retired   Discharge Planning   Potential Assistance Purchasing Medications No   Patient expects to be discharged to: House   Services At/After Discharge   Mode of Transport at Discharge Other (see comment)   Confirm Follow Up Transport Family     CM met f/f with Pt and her daughter, Pt daughter confirmed that the information on the face sheet is correct. Pt daughter stated that Pt lives with her, her fiance, and her child.    No HH, no DME and independent with ADL     Send RX to [facility] in [place]    Family will transport Pt when D/C    CM dispo: TBD    Advance Care Planning     General Advance Care Planning (ACP) Conversation    Date of Conversation: 3/14/2024  Conducted with:     Healthcare Decision Maker:    Primary Decision Maker: Marlin Mayo - Child - [phone]  Click here to complete Healthcare Decision Makers including selection of the Healthcare Decision Maker Relationship (ie \"Primary\").   Today we     Content/Action Overview:    Reviewed DNR/DNI and

## 2024-03-14 NOTE — H&P
History and Physical    NAME:   April Mayo   :  1955   MRN:  [id number]     Date/Time: 3/13/2024 11:17 PM    Patient PCP: Lyly Bowman MD  ______________________________________________________________________       Subjective:     CHIEF COMPLAINT:     Dysphonia      HISTORY OF PRESENT ILLNESS:       Patient is a 68 y.o. year old female past medical history of hypertension history of arthritis Graves' disease status post ablation COPD who was seen by the ENT for dysphonia also history of sleep apnea   Was sent by the ENT for vocal cord biopsy patient having some shortness of breath only on exertion but no shortness of breath on rest patient having the symptoms for more 6-month seen by the ER physician recommended patient to be admitted for further evaluation and treatment    Past Medical History:   Diagnosis Date    Arthritis     Graves disease     Hypertension         Past Surgical History:   Procedure Laterality Date    BUNIONECTOMY      HYSTERECTOMY (CERVIX STATUS UNKNOWN)      HYSTERECTOMY, VAGINAL      OTHER SURGICAL HISTORY      eye surgery for graves disease       Social History     Tobacco Use    Smoking status: Former     Current packs/day: 0.00     Types: Cigarettes     Quit date: 2023     Years since quittin.5    Smokeless tobacco: Never   Substance Use Topics    Alcohol use: Never        Family History   Problem Relation Age of Onset    Heart Failure Mother     Rheum Arthritis Sister     Stroke Sister     Thyroid Disease Sister        No Known Allergies     Prior to Admission medications    Medication Sig Start Date End Date Taking? Authorizing Provider   levocetirizine (XYZAL) 5 MG tablet Take 1 tablet by mouth daily    ProviderSanta MD   Budeson-Glycopyrrol-Formoterol 160-9-4.8 MCG/ACT AERO Inhale into the lungs 2 times daily    Santa Ruffin MD   UNITHROID 150 MCG tablet Take 1 tab Mon-Sat only. NONE on Sundays. Stop 175 mcg 23   Yudith Gomez MD

## 2024-03-14 NOTE — OP NOTE
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]    Post-Op Diagnosis: Same       Procedure(s):  AWAKE URGENT TRACHEOSTOMY AND BIOPSY (LOCAL WITH ANES STANDBY)    Surgeon(s):  Ramona Goldman MD Vakil, Mayand P, MD    Assistant:   * No surgical staff found *    Anesthesia: Local    Estimated Blood Loss (mL): Minimal    Complications: None    Specimens:   ID Type Source Tests Collected by Time Destination   1 : RIGHT VOCAL CHORD BIOPSY Tissue Larynx SURGICAL PATHOLOGY Vaishali Pringle MD 3/14/2024 0838        Implants:  * No implants in log *      Drains: * No LDAs found *    Findings: 6.0 cuffed Shiley tracheostomy positioned between 2-3 tracheal rings    Detailed Description of Procedure:   I was present for duration of tracheostomy and assisted with airway management in this complex acute situation    Electronically signed by Ramona Goldman MD on 3/14/2024 at 1:23 PM

## 2024-03-15 ENCOUNTER — APPOINTMENT (OUTPATIENT)
Facility: HOSPITAL | Age: 69
DRG: 012 | End: 2024-03-15
Payer: MEDICARE

## 2024-03-15 LAB
ALBUMIN SERPL-MCNC: 3.2 G/DL (ref 3.5–5)
ANION GAP SERPL CALC-SCNC: 7 MMOL/L (ref 5–15)
BASOPHILS # BLD: 0 K/UL (ref 0–0.1)
BASOPHILS NFR BLD: 0 % (ref 0–1)
BUN SERPL-MCNC: 12 MG/DL (ref 6–20)
BUN/CREAT SERPL: 15 (ref 12–20)
CA-I BLD-MCNC: 9.1 MG/DL (ref 8.5–10.1)
CHLORIDE SERPL-SCNC: 106 MMOL/L (ref 97–108)
CO2 SERPL-SCNC: 23 MMOL/L (ref 21–32)
CREAT SERPL-MCNC: 0.78 MG/DL (ref 0.55–1.02)
DIFFERENTIAL METHOD BLD: ABNORMAL
EOSINOPHIL # BLD: 0 K/UL (ref 0–0.4)
EOSINOPHIL NFR BLD: 0 % (ref 0–7)
ERYTHROCYTE [DISTWIDTH] IN BLOOD BY AUTOMATED COUNT: 13.1 % (ref 11.5–14.5)
GLUCOSE SERPL-MCNC: 105 MG/DL (ref 65–100)
HCT VFR BLD AUTO: 40.9 % (ref 35–47)
HGB BLD-MCNC: 13.1 G/DL (ref 11.5–16)
IMM GRANULOCYTES # BLD AUTO: 0.1 K/UL (ref 0–0.04)
IMM GRANULOCYTES NFR BLD AUTO: 1 % (ref 0–0.5)
LYMPHOCYTES # BLD: 2.3 K/UL (ref 0.8–3.5)
LYMPHOCYTES NFR BLD: 18 % (ref 12–49)
MCH RBC QN AUTO: 29.6 PG (ref 26–34)
MCHC RBC AUTO-ENTMCNC: 32 G/DL (ref 30–36.5)
MCV RBC AUTO: 92.3 FL (ref 80–99)
MONOCYTES # BLD: 1.2 K/UL (ref 0–1)
MONOCYTES NFR BLD: 9 % (ref 5–13)
NEUTS SEG # BLD: 9.4 K/UL (ref 1.8–8)
NEUTS SEG NFR BLD: 72 % (ref 32–75)
NRBC # BLD: 0 K/UL (ref 0–0.01)
NRBC BLD-RTO: 0 PER 100 WBC
PHOSPHATE SERPL-MCNC: 3.2 MG/DL (ref 2.6–4.7)
PLATELET # BLD AUTO: 273 K/UL (ref 150–400)
PMV BLD AUTO: 10 FL (ref 8.9–12.9)
POTASSIUM SERPL-SCNC: 3.8 MMOL/L (ref 3.5–5.1)
RBC # BLD AUTO: 4.43 M/UL (ref 3.8–5.2)
SODIUM SERPL-SCNC: 136 MMOL/L (ref 136–145)
WBC # BLD AUTO: 13.1 K/UL (ref 3.6–11)

## 2024-03-15 PROCEDURE — 6370000000 HC RX 637 (ALT 250 FOR IP): Performed by: INTERNAL MEDICINE

## 2024-03-15 PROCEDURE — 36415 COLL VENOUS BLD VENIPUNCTURE: CPT

## 2024-03-15 PROCEDURE — 92526 ORAL FUNCTION THERAPY: CPT

## 2024-03-15 PROCEDURE — 94640 AIRWAY INHALATION TREATMENT: CPT

## 2024-03-15 PROCEDURE — 85025 COMPLETE CBC W/AUTO DIFF WBC: CPT

## 2024-03-15 PROCEDURE — 2580000003 HC RX 258: Performed by: FAMILY MEDICINE

## 2024-03-15 PROCEDURE — 71045 X-RAY EXAM CHEST 1 VIEW: CPT

## 2024-03-15 PROCEDURE — 94761 N-INVAS EAR/PLS OXIMETRY MLT: CPT

## 2024-03-15 PROCEDURE — 6370000000 HC RX 637 (ALT 250 FOR IP): Performed by: FAMILY MEDICINE

## 2024-03-15 PROCEDURE — 6360000002 HC RX W HCPCS: Performed by: INTERNAL MEDICINE

## 2024-03-15 PROCEDURE — 80069 RENAL FUNCTION PANEL: CPT

## 2024-03-15 PROCEDURE — 6360000002 HC RX W HCPCS: Performed by: FAMILY MEDICINE

## 2024-03-15 PROCEDURE — 1100000000 HC RM PRIVATE

## 2024-03-15 PROCEDURE — 99233 SBSQ HOSP IP/OBS HIGH 50: CPT | Performed by: STUDENT IN AN ORGANIZED HEALTH CARE EDUCATION/TRAINING PROGRAM

## 2024-03-15 RX ADMIN — BENZONATATE 200 MG: 100 CAPSULE ORAL at 04:30

## 2024-03-15 RX ADMIN — ENOXAPARIN SODIUM 30 MG: 100 INJECTION SUBCUTANEOUS at 08:12

## 2024-03-15 RX ADMIN — Medication 2 PUFF: at 19:28

## 2024-03-15 RX ADMIN — BENZONATATE 200 MG: 100 CAPSULE ORAL at 12:33

## 2024-03-15 RX ADMIN — Medication 30 MG: at 08:12

## 2024-03-15 RX ADMIN — LOSARTAN POTASSIUM 50 MG: 50 TABLET, FILM COATED ORAL at 08:12

## 2024-03-15 RX ADMIN — ENOXAPARIN SODIUM 30 MG: 100 INJECTION SUBCUTANEOUS at 23:09

## 2024-03-15 RX ADMIN — ACETAMINOPHEN 650 MG: 650 SOLUTION ORAL at 08:29

## 2024-03-15 RX ADMIN — ACETAMINOPHEN 650 MG: 650 SOLUTION ORAL at 17:57

## 2024-03-15 RX ADMIN — MORPHINE SULFATE 2 MG: 2 INJECTION, SOLUTION INTRAMUSCULAR; INTRAVENOUS at 14:31

## 2024-03-15 RX ADMIN — Medication 2 PUFF: at 09:10

## 2024-03-15 RX ADMIN — Medication 30 MG: at 22:54

## 2024-03-15 RX ADMIN — BENZONATATE 200 MG: 100 CAPSULE ORAL at 22:53

## 2024-03-15 RX ADMIN — SODIUM CHLORIDE, PRESERVATIVE FREE 10 ML: 5 INJECTION INTRAVENOUS at 23:11

## 2024-03-15 RX ADMIN — CETIRIZINE HYDROCHLORIDE 5 MG: 10 TABLET, FILM COATED ORAL at 08:12

## 2024-03-15 RX ADMIN — LEVOTHYROXINE SODIUM 150 MCG: 0.07 TABLET ORAL at 08:12

## 2024-03-15 RX ADMIN — SODIUM CHLORIDE, PRESERVATIVE FREE 10 ML: 5 INJECTION INTRAVENOUS at 09:22

## 2024-03-15 ASSESSMENT — PAIN SCALES - GENERAL
PAINLEVEL_OUTOF10: 6
PAINLEVEL_OUTOF10: 2
PAINLEVEL_OUTOF10: 0
PAINLEVEL_OUTOF10: 0
PAINLEVEL_OUTOF10: 6
PAINLEVEL_OUTOF10: 5

## 2024-03-15 ASSESSMENT — PAIN SCALES - WONG BAKER
WONGBAKER_NUMERICALRESPONSE: NO HURT
WONGBAKER_NUMERICALRESPONSE: HURTS A LITTLE BIT
WONGBAKER_NUMERICALRESPONSE: HURTS A LITTLE BIT

## 2024-03-15 ASSESSMENT — PAIN DESCRIPTION - LOCATION
LOCATION: HEAD
LOCATION: HEAD
LOCATION: HEAD;THROAT;NECK
LOCATION: HEAD;NECK

## 2024-03-15 NOTE — CARE COORDINATION
CM reviewed Pt medicals, Pt lives with her daughter, her daughters fiance and her Grandchild.    Will need PT/OT eval/recommendations.     CM will follow Pt for D/C needs.    Pt is a new Trach.

## 2024-03-15 NOTE — ADT AUTH CERT
mcg po qd  Losartan 50 mg po qd     Following with otolaryngology, pulmonology, heme/onc, urology     Tranfer to floor           Current Medication      Current Facility-Administered Medications:     losartan (COZAAR) tablet 50 mg, 50 mg, Oral, Daily, Alessandra Carpio MD, 50 mg at 03/15/24 0812    morphine (PF) injection 2 mg, 2 mg, IntraVENous, Q4H PRN, Ed Vega MD, 2 mg at 03/14/24 1412    hydrALAZINE (APRESOLINE) injection 10 mg, 10 mg, IntraVENous, Q4H PRN, Alessandra Carpio MD, 10 mg at 03/14/24 1207    dextromethorphan (DELSYM) 30 MG/5ML extended release liquid 30 mg, 30 mg, Oral, 2 times per day, Ed Vega MD, 30 mg at 03/15/24 0812    benzonatate (TESSALON) capsule 200 mg, 200 mg, Oral, Q8H, Ed Vega MD, 200 mg at 03/15/24 0430    budesonide-formoterol (SYMBICORT) 160-4.5 MCG/ACT inhaler 2 puff, 2 puff, Inhalation, BID RT, Ed Vega MD, 2 puff at 03/15/24 0910    acetaminophen (TYLENOL) 160 MG/5ML solution 650 mg, 650 mg, Oral, Q6H PRN, Alessandra Carpio MD, 650 mg at 03/15/24 0829    cetirizine (ZYRTEC) tablet 5 mg, 5 mg, Oral, Daily, Alessandra Carpio MD, 5 mg at 03/15/24 0812    levothyroxine (SYNTHROID) tablet 150 mcg, 150 mcg, Oral, Daily, Alessandra Carpio MD, 150 mcg at 03/15/24 0812    sodium chloride flush 0.9 % injection 5-40 mL, 5-40 mL, IntraVENous, 2 times per day, Alessandra Carpio MD, 10 mL at 03/15/24 0922    sodium chloride flush 0.9 % injection 5-40 mL, 5-40 mL, IntraVENous, PRN, Alessandra Carpio MD    0.9 % sodium chloride infusion, , IntraVENous, PRN, Alessandra Carpio MD    potassium chloride (KLOR-CON M) extended release tablet 40 mEq, 40 mEq, Oral, PRN **OR** potassium bicarb-citric acid (EFFER-K) effervescent tablet 40 mEq, 40 mEq, Oral, PRN **OR** potassium chloride 10 mEq/100 mL IVPB (Peripheral Line), 10 mEq, IntraVENous, PRN, Alessandra Carpio MD    magnesium sulfate 2000 mg in 50 mL IVPB premix, 2,000 mg, IntraVENous, PRN, Alessandra Carpio MD

## 2024-03-16 ENCOUNTER — APPOINTMENT (OUTPATIENT)
Facility: HOSPITAL | Age: 69
DRG: 012 | End: 2024-03-16
Attending: FAMILY MEDICINE
Payer: MEDICARE

## 2024-03-16 PROCEDURE — 1100000000 HC RM PRIVATE

## 2024-03-16 PROCEDURE — 92526 ORAL FUNCTION THERAPY: CPT

## 2024-03-16 PROCEDURE — 94761 N-INVAS EAR/PLS OXIMETRY MLT: CPT

## 2024-03-16 PROCEDURE — 6370000000 HC RX 637 (ALT 250 FOR IP): Performed by: FAMILY MEDICINE

## 2024-03-16 PROCEDURE — 6360000002 HC RX W HCPCS: Performed by: INTERNAL MEDICINE

## 2024-03-16 PROCEDURE — 2700000000 HC OXYGEN THERAPY PER DAY

## 2024-03-16 PROCEDURE — 6370000000 HC RX 637 (ALT 250 FOR IP): Performed by: INTERNAL MEDICINE

## 2024-03-16 PROCEDURE — 6360000002 HC RX W HCPCS: Performed by: FAMILY MEDICINE

## 2024-03-16 PROCEDURE — 2580000003 HC RX 258: Performed by: FAMILY MEDICINE

## 2024-03-16 PROCEDURE — 94640 AIRWAY INHALATION TREATMENT: CPT

## 2024-03-16 RX ORDER — BENZONATATE 100 MG/1
200 CAPSULE ORAL 3 TIMES DAILY PRN
Status: DISCONTINUED | OUTPATIENT
Start: 2024-03-16 | End: 2024-03-25 | Stop reason: SDUPTHER

## 2024-03-16 RX ORDER — DEXTROMETHORPHAN POLISTIREX 30 MG/5ML
30 SUSPENSION ORAL EVERY 12 HOURS PRN
Status: DISCONTINUED | OUTPATIENT
Start: 2024-03-16 | End: 2024-03-18

## 2024-03-16 RX ORDER — GUAIFENESIN 200 MG/10ML
200 LIQUID ORAL EVERY 4 HOURS PRN
Status: DISCONTINUED | OUTPATIENT
Start: 2024-03-16 | End: 2024-03-23

## 2024-03-16 RX ADMIN — ENOXAPARIN SODIUM 30 MG: 100 INJECTION SUBCUTANEOUS at 22:08

## 2024-03-16 RX ADMIN — BENZONATATE 200 MG: 100 CAPSULE ORAL at 05:33

## 2024-03-16 RX ADMIN — LEVOTHYROXINE SODIUM 150 MCG: 0.07 TABLET ORAL at 05:33

## 2024-03-16 RX ADMIN — BENZONATATE 200 MG: 100 CAPSULE ORAL at 10:10

## 2024-03-16 RX ADMIN — MORPHINE SULFATE 2 MG: 2 INJECTION, SOLUTION INTRAMUSCULAR; INTRAVENOUS at 17:50

## 2024-03-16 RX ADMIN — LOSARTAN POTASSIUM 50 MG: 50 TABLET, FILM COATED ORAL at 10:09

## 2024-03-16 RX ADMIN — SODIUM CHLORIDE, PRESERVATIVE FREE 10 ML: 5 INJECTION INTRAVENOUS at 10:09

## 2024-03-16 RX ADMIN — Medication 2 PUFF: at 07:51

## 2024-03-16 RX ADMIN — CETIRIZINE HYDROCHLORIDE 5 MG: 10 TABLET, FILM COATED ORAL at 10:08

## 2024-03-16 RX ADMIN — SODIUM CHLORIDE, PRESERVATIVE FREE 10 ML: 5 INJECTION INTRAVENOUS at 22:08

## 2024-03-16 RX ADMIN — Medication 30 MG: at 10:10

## 2024-03-16 RX ADMIN — ENOXAPARIN SODIUM 30 MG: 100 INJECTION SUBCUTANEOUS at 10:07

## 2024-03-16 RX ADMIN — Medication 2 PUFF: at 20:57

## 2024-03-16 ASSESSMENT — PAIN DESCRIPTION - DESCRIPTORS: DESCRIPTORS: ACHING

## 2024-03-16 ASSESSMENT — PAIN SCALES - GENERAL
PAINLEVEL_OUTOF10: 7
PAINLEVEL_OUTOF10: 4

## 2024-03-16 ASSESSMENT — PAIN DESCRIPTION - LOCATION: LOCATION: SHOULDER

## 2024-03-16 ASSESSMENT — PAIN DESCRIPTION - ORIENTATION: ORIENTATION: RIGHT

## 2024-03-17 ENCOUNTER — APPOINTMENT (OUTPATIENT)
Facility: HOSPITAL | Age: 69
DRG: 012 | End: 2024-03-17
Attending: FAMILY MEDICINE
Payer: MEDICARE

## 2024-03-17 ENCOUNTER — APPOINTMENT (OUTPATIENT)
Facility: HOSPITAL | Age: 69
DRG: 012 | End: 2024-03-17
Payer: MEDICARE

## 2024-03-17 PROCEDURE — 97162 PT EVAL MOD COMPLEX 30 MIN: CPT

## 2024-03-17 PROCEDURE — 2580000003 HC RX 258: Performed by: FAMILY MEDICINE

## 2024-03-17 PROCEDURE — 94640 AIRWAY INHALATION TREATMENT: CPT

## 2024-03-17 PROCEDURE — 93971 EXTREMITY STUDY: CPT | Performed by: SURGERY

## 2024-03-17 PROCEDURE — 71045 X-RAY EXAM CHEST 1 VIEW: CPT

## 2024-03-17 PROCEDURE — 6360000002 HC RX W HCPCS: Performed by: FAMILY MEDICINE

## 2024-03-17 PROCEDURE — 6370000000 HC RX 637 (ALT 250 FOR IP): Performed by: FAMILY MEDICINE

## 2024-03-17 PROCEDURE — 92526 ORAL FUNCTION THERAPY: CPT

## 2024-03-17 PROCEDURE — 2500000003 HC RX 250 WO HCPCS: Performed by: INTERNAL MEDICINE

## 2024-03-17 PROCEDURE — 94761 N-INVAS EAR/PLS OXIMETRY MLT: CPT

## 2024-03-17 PROCEDURE — 2700000000 HC OXYGEN THERAPY PER DAY

## 2024-03-17 PROCEDURE — 93971 EXTREMITY STUDY: CPT

## 2024-03-17 PROCEDURE — 1100000000 HC RM PRIVATE

## 2024-03-17 PROCEDURE — 6370000000 HC RX 637 (ALT 250 FOR IP): Performed by: INTERNAL MEDICINE

## 2024-03-17 PROCEDURE — 97116 GAIT TRAINING THERAPY: CPT

## 2024-03-17 RX ADMIN — SODIUM CHLORIDE, PRESERVATIVE FREE 10 ML: 5 INJECTION INTRAVENOUS at 20:17

## 2024-03-17 RX ADMIN — ACETAMINOPHEN 650 MG: 650 SOLUTION ORAL at 14:04

## 2024-03-17 RX ADMIN — CETIRIZINE HYDROCHLORIDE 5 MG: 10 TABLET, FILM COATED ORAL at 10:00

## 2024-03-17 RX ADMIN — BENZONATATE 200 MG: 100 CAPSULE ORAL at 14:05

## 2024-03-17 RX ADMIN — GUAIFENESIN 200 MG: 200 SOLUTION ORAL at 10:00

## 2024-03-17 RX ADMIN — ACETAMINOPHEN 650 MG: 650 SOLUTION ORAL at 20:20

## 2024-03-17 RX ADMIN — Medication 2 PUFF: at 23:00

## 2024-03-17 RX ADMIN — LEVOTHYROXINE SODIUM 150 MCG: 0.07 TABLET ORAL at 05:59

## 2024-03-17 RX ADMIN — Medication 2 PUFF: at 07:34

## 2024-03-17 RX ADMIN — ENOXAPARIN SODIUM 30 MG: 100 INJECTION SUBCUTANEOUS at 20:13

## 2024-03-17 RX ADMIN — ENOXAPARIN SODIUM 30 MG: 100 INJECTION SUBCUTANEOUS at 10:00

## 2024-03-17 RX ADMIN — HYDRALAZINE HYDROCHLORIDE 10 MG: 20 INJECTION INTRAMUSCULAR; INTRAVENOUS at 20:13

## 2024-03-17 RX ADMIN — LOSARTAN POTASSIUM 50 MG: 50 TABLET, FILM COATED ORAL at 10:00

## 2024-03-17 RX ADMIN — SODIUM CHLORIDE, PRESERVATIVE FREE 10 ML: 5 INJECTION INTRAVENOUS at 09:00

## 2024-03-17 RX ADMIN — GUAIFENESIN 200 MG: 200 SOLUTION ORAL at 20:12

## 2024-03-17 ASSESSMENT — PAIN SCALES - WONG BAKER
WONGBAKER_NUMERICALRESPONSE: NO HURT
WONGBAKER_NUMERICALRESPONSE: NO HURT

## 2024-03-17 ASSESSMENT — PAIN SCALES - GENERAL
PAINLEVEL_OUTOF10: 5
PAINLEVEL_OUTOF10: 0
PAINLEVEL_OUTOF10: 6

## 2024-03-17 ASSESSMENT — PAIN DESCRIPTION - ORIENTATION
ORIENTATION: RIGHT
ORIENTATION: ANTERIOR;RIGHT

## 2024-03-17 ASSESSMENT — PAIN DESCRIPTION - DESCRIPTORS
DESCRIPTORS: ACHING
DESCRIPTORS: ACHING

## 2024-03-17 ASSESSMENT — PAIN DESCRIPTION - LOCATION
LOCATION: SHOULDER
LOCATION: SHOULDER;HEAD

## 2024-03-18 LAB — ECHO BSA: 2.22 M2

## 2024-03-18 PROCEDURE — 2500000003 HC RX 250 WO HCPCS: Performed by: INTERNAL MEDICINE

## 2024-03-18 PROCEDURE — 94640 AIRWAY INHALATION TREATMENT: CPT

## 2024-03-18 PROCEDURE — 6370000000 HC RX 637 (ALT 250 FOR IP): Performed by: INTERNAL MEDICINE

## 2024-03-18 PROCEDURE — 1100000000 HC RM PRIVATE

## 2024-03-18 PROCEDURE — 6360000002 HC RX W HCPCS: Performed by: FAMILY MEDICINE

## 2024-03-18 PROCEDURE — 93971 EXTREMITY STUDY: CPT | Performed by: SURGERY

## 2024-03-18 PROCEDURE — 94761 N-INVAS EAR/PLS OXIMETRY MLT: CPT

## 2024-03-18 PROCEDURE — 6370000000 HC RX 637 (ALT 250 FOR IP): Performed by: FAMILY MEDICINE

## 2024-03-18 PROCEDURE — 2580000003 HC RX 258: Performed by: FAMILY MEDICINE

## 2024-03-18 RX ORDER — DEXTROMETHORPHAN POLISTIREX 30 MG/5ML
30 SUSPENSION ORAL EVERY 12 HOURS
Status: DISCONTINUED | OUTPATIENT
Start: 2024-03-18 | End: 2024-04-01 | Stop reason: HOSPADM

## 2024-03-18 RX ADMIN — BENZONATATE 200 MG: 100 CAPSULE ORAL at 03:19

## 2024-03-18 RX ADMIN — ACETAMINOPHEN 650 MG: 650 SOLUTION ORAL at 17:36

## 2024-03-18 RX ADMIN — SODIUM CHLORIDE, PRESERVATIVE FREE 10 ML: 5 INJECTION INTRAVENOUS at 20:22

## 2024-03-18 RX ADMIN — Medication 2 PUFF: at 09:47

## 2024-03-18 RX ADMIN — ENOXAPARIN SODIUM 30 MG: 100 INJECTION SUBCUTANEOUS at 09:06

## 2024-03-18 RX ADMIN — BENZONATATE 200 MG: 100 CAPSULE ORAL at 20:20

## 2024-03-18 RX ADMIN — Medication 2 PUFF: at 20:17

## 2024-03-18 RX ADMIN — GUAIFENESIN 200 MG: 200 SOLUTION ORAL at 09:09

## 2024-03-18 RX ADMIN — Medication 30 MG: at 13:03

## 2024-03-18 RX ADMIN — LOSARTAN POTASSIUM 50 MG: 50 TABLET, FILM COATED ORAL at 09:05

## 2024-03-18 RX ADMIN — ENOXAPARIN SODIUM 30 MG: 100 INJECTION SUBCUTANEOUS at 20:21

## 2024-03-18 RX ADMIN — SODIUM CHLORIDE, PRESERVATIVE FREE 10 ML: 5 INJECTION INTRAVENOUS at 09:06

## 2024-03-18 RX ADMIN — LEVOTHYROXINE SODIUM 150 MCG: 0.07 TABLET ORAL at 06:22

## 2024-03-18 RX ADMIN — GUAIFENESIN 200 MG: 200 SOLUTION ORAL at 17:35

## 2024-03-18 RX ADMIN — ACETAMINOPHEN 650 MG: 650 SOLUTION ORAL at 09:05

## 2024-03-18 RX ADMIN — CETIRIZINE HYDROCHLORIDE 5 MG: 10 TABLET, FILM COATED ORAL at 09:05

## 2024-03-18 ASSESSMENT — PAIN DESCRIPTION - LOCATION: LOCATION: HEAD;SHOULDER

## 2024-03-18 ASSESSMENT — PAIN DESCRIPTION - ORIENTATION: ORIENTATION: RIGHT

## 2024-03-18 ASSESSMENT — PAIN SCALES - GENERAL
PAINLEVEL_OUTOF10: 6
PAINLEVEL_OUTOF10: 5

## 2024-03-18 NOTE — CARE COORDINATION
CM reviewed chart. Patient is new Trach patient, will need supplies setup. Reached out to [facility] to see what is needed for Trach supplies. Awaiting order form, will get signed by MD to setup trach supplies in home setting. Current therapy recommendation from PT is home health.

## 2024-03-19 PROCEDURE — 94761 N-INVAS EAR/PLS OXIMETRY MLT: CPT

## 2024-03-19 PROCEDURE — 6370000000 HC RX 637 (ALT 250 FOR IP): Performed by: FAMILY MEDICINE

## 2024-03-19 PROCEDURE — 92526 ORAL FUNCTION THERAPY: CPT

## 2024-03-19 PROCEDURE — 6370000000 HC RX 637 (ALT 250 FOR IP): Performed by: INTERNAL MEDICINE

## 2024-03-19 PROCEDURE — 31502 CHANGE OF WINDPIPE AIRWAY: CPT | Performed by: STUDENT IN AN ORGANIZED HEALTH CARE EDUCATION/TRAINING PROGRAM

## 2024-03-19 PROCEDURE — 2700000000 HC OXYGEN THERAPY PER DAY

## 2024-03-19 PROCEDURE — 1100000000 HC RM PRIVATE

## 2024-03-19 PROCEDURE — 6360000002 HC RX W HCPCS: Performed by: FAMILY MEDICINE

## 2024-03-19 PROCEDURE — 99233 SBSQ HOSP IP/OBS HIGH 50: CPT | Performed by: STUDENT IN AN ORGANIZED HEALTH CARE EDUCATION/TRAINING PROGRAM

## 2024-03-19 PROCEDURE — 94640 AIRWAY INHALATION TREATMENT: CPT

## 2024-03-19 PROCEDURE — 97530 THERAPEUTIC ACTIVITIES: CPT

## 2024-03-19 PROCEDURE — 2580000003 HC RX 258: Performed by: FAMILY MEDICINE

## 2024-03-19 PROCEDURE — 0B21XFZ CHANGE TRACHEOSTOMY DEVICE IN TRACHEA, EXTERNAL APPROACH: ICD-10-PCS | Performed by: STUDENT IN AN ORGANIZED HEALTH CARE EDUCATION/TRAINING PROGRAM

## 2024-03-19 PROCEDURE — 97165 OT EVAL LOW COMPLEX 30 MIN: CPT

## 2024-03-19 PROCEDURE — 2500000003 HC RX 250 WO HCPCS: Performed by: INTERNAL MEDICINE

## 2024-03-19 RX ADMIN — LEVOTHYROXINE SODIUM 150 MCG: 0.07 TABLET ORAL at 06:42

## 2024-03-19 RX ADMIN — Medication 30 MG: at 23:11

## 2024-03-19 RX ADMIN — ENOXAPARIN SODIUM 30 MG: 100 INJECTION SUBCUTANEOUS at 20:32

## 2024-03-19 RX ADMIN — BENZONATATE 200 MG: 100 CAPSULE ORAL at 04:57

## 2024-03-19 RX ADMIN — Medication 30 MG: at 00:32

## 2024-03-19 RX ADMIN — GUAIFENESIN 200 MG: 200 SOLUTION ORAL at 20:32

## 2024-03-19 RX ADMIN — SODIUM CHLORIDE, PRESERVATIVE FREE 10 ML: 5 INJECTION INTRAVENOUS at 20:38

## 2024-03-19 RX ADMIN — Medication 2 PUFF: at 20:41

## 2024-03-19 RX ADMIN — CETIRIZINE HYDROCHLORIDE 5 MG: 10 TABLET, FILM COATED ORAL at 08:54

## 2024-03-19 RX ADMIN — SODIUM CHLORIDE, PRESERVATIVE FREE 10 ML: 5 INJECTION INTRAVENOUS at 08:57

## 2024-03-19 RX ADMIN — Medication 2 PUFF: at 08:23

## 2024-03-19 RX ADMIN — ENOXAPARIN SODIUM 30 MG: 100 INJECTION SUBCUTANEOUS at 08:54

## 2024-03-19 RX ADMIN — BENZONATATE 200 MG: 100 CAPSULE ORAL at 08:54

## 2024-03-19 RX ADMIN — BENZONATATE 200 MG: 100 CAPSULE ORAL at 20:32

## 2024-03-19 RX ADMIN — Medication 30 MG: at 14:40

## 2024-03-19 RX ADMIN — LOSARTAN POTASSIUM 50 MG: 50 TABLET, FILM COATED ORAL at 08:53

## 2024-03-19 RX ADMIN — GUAIFENESIN 200 MG: 200 SOLUTION ORAL at 16:27

## 2024-03-19 RX ADMIN — ACETAMINOPHEN 650 MG: 650 SOLUTION ORAL at 19:30

## 2024-03-19 ASSESSMENT — PAIN SCALES - GENERAL: PAINLEVEL_OUTOF10: 8

## 2024-03-19 ASSESSMENT — PAIN DESCRIPTION - DESCRIPTORS: DESCRIPTORS: ACHING

## 2024-03-19 ASSESSMENT — PAIN DESCRIPTION - LOCATION: LOCATION: HEAD

## 2024-03-19 NOTE — CARE COORDINATION
Spoke with daughter at desk. Discussed placement for SNF. Went over qualifications and areas to look. Got ok to send referral to [facility] and 2 other facilities in 25 mile radius.

## 2024-03-19 NOTE — CARE COORDINATION
Trach order from filled and signed. Order form faxed to [facility] for fulfillment.

## 2024-03-20 ENCOUNTER — APPOINTMENT (OUTPATIENT)
Facility: HOSPITAL | Age: 69
DRG: 012 | End: 2024-03-20
Payer: MEDICARE

## 2024-03-20 PROBLEM — C32.9 LARYNGEAL CANCER (HCC): Status: ACTIVE | Noted: 2024-03-20

## 2024-03-20 PROBLEM — Z43.0 TRACHEOSTOMY CARE (HCC): Status: ACTIVE | Noted: 2024-03-20

## 2024-03-20 LAB
BASOPHILS # BLD: 0 K/UL (ref 0–0.1)
BASOPHILS NFR BLD: 0 % (ref 0–1)
DIFFERENTIAL METHOD BLD: ABNORMAL
EOSINOPHIL # BLD: 0.2 K/UL (ref 0–0.4)
EOSINOPHIL NFR BLD: 2 % (ref 0–7)
ERYTHROCYTE [DISTWIDTH] IN BLOOD BY AUTOMATED COUNT: 13.1 % (ref 11.5–14.5)
HCT VFR BLD AUTO: 41.1 % (ref 35–47)
HGB BLD-MCNC: 13.2 G/DL (ref 11.5–16)
IMM GRANULOCYTES # BLD AUTO: 0.1 K/UL (ref 0–0.04)
IMM GRANULOCYTES NFR BLD AUTO: 0 % (ref 0–0.5)
LYMPHOCYTES # BLD: 2.3 K/UL (ref 0.8–3.5)
LYMPHOCYTES NFR BLD: 20 % (ref 12–49)
MCH RBC QN AUTO: 29.2 PG (ref 26–34)
MCHC RBC AUTO-ENTMCNC: 32.1 G/DL (ref 30–36.5)
MCV RBC AUTO: 90.9 FL (ref 80–99)
MONOCYTES # BLD: 1.2 K/UL (ref 0–1)
MONOCYTES NFR BLD: 10 % (ref 5–13)
NEUTS SEG # BLD: 7.6 K/UL (ref 1.8–8)
NEUTS SEG NFR BLD: 68 % (ref 32–75)
NRBC # BLD: 0 K/UL (ref 0–0.01)
NRBC BLD-RTO: 0 PER 100 WBC
PLATELET # BLD AUTO: 262 K/UL (ref 150–400)
PMV BLD AUTO: 10.2 FL (ref 8.9–12.9)
RBC # BLD AUTO: 4.52 M/UL (ref 3.8–5.2)
WBC # BLD AUTO: 11.4 K/UL (ref 3.6–11)

## 2024-03-20 PROCEDURE — 2500000003 HC RX 250 WO HCPCS: Performed by: INTERNAL MEDICINE

## 2024-03-20 PROCEDURE — 74230 X-RAY XM SWLNG FUNCJ C+: CPT

## 2024-03-20 PROCEDURE — 97530 THERAPEUTIC ACTIVITIES: CPT

## 2024-03-20 PROCEDURE — 2500000003 HC RX 250 WO HCPCS: Performed by: STUDENT IN AN ORGANIZED HEALTH CARE EDUCATION/TRAINING PROGRAM

## 2024-03-20 PROCEDURE — 94640 AIRWAY INHALATION TREATMENT: CPT

## 2024-03-20 PROCEDURE — 6370000000 HC RX 637 (ALT 250 FOR IP): Performed by: FAMILY MEDICINE

## 2024-03-20 PROCEDURE — 36415 COLL VENOUS BLD VENIPUNCTURE: CPT

## 2024-03-20 PROCEDURE — 92526 ORAL FUNCTION THERAPY: CPT

## 2024-03-20 PROCEDURE — 6370000000 HC RX 637 (ALT 250 FOR IP): Performed by: INTERNAL MEDICINE

## 2024-03-20 PROCEDURE — 92611 MOTION FLUOROSCOPY/SWALLOW: CPT

## 2024-03-20 PROCEDURE — 99233 SBSQ HOSP IP/OBS HIGH 50: CPT | Performed by: STUDENT IN AN ORGANIZED HEALTH CARE EDUCATION/TRAINING PROGRAM

## 2024-03-20 PROCEDURE — 92597 ORAL SPEECH DEVICE EVAL: CPT

## 2024-03-20 PROCEDURE — 85025 COMPLETE CBC W/AUTO DIFF WBC: CPT

## 2024-03-20 PROCEDURE — 94761 N-INVAS EAR/PLS OXIMETRY MLT: CPT

## 2024-03-20 PROCEDURE — 92507 TX SP LANG VOICE COMM INDIV: CPT

## 2024-03-20 PROCEDURE — 2580000003 HC RX 258: Performed by: FAMILY MEDICINE

## 2024-03-20 PROCEDURE — 97550 CAREGIVER TRAING 1ST 30 MIN: CPT

## 2024-03-20 PROCEDURE — 6360000002 HC RX W HCPCS: Performed by: FAMILY MEDICINE

## 2024-03-20 PROCEDURE — 1100000000 HC RM PRIVATE

## 2024-03-20 RX ORDER — BENZONATATE 200 MG/1
200 CAPSULE ORAL 3 TIMES DAILY PRN
Qty: 30 CAPSULE | Refills: 0 | Status: SHIPPED | OUTPATIENT
Start: 2024-03-20 | End: 2024-03-27

## 2024-03-20 RX ORDER — DEXTROMETHORPHAN POLISTIREX 30 MG/5ML
30 SUSPENSION ORAL EVERY 12 HOURS
Qty: 100 ML | Refills: 0 | Status: SHIPPED | OUTPATIENT
Start: 2024-03-20 | End: 2024-03-30

## 2024-03-20 RX ORDER — BUDESONIDE AND FORMOTEROL FUMARATE DIHYDRATE 160; 4.5 UG/1; UG/1
2 AEROSOL RESPIRATORY (INHALATION)
Qty: 10.2 G | Refills: 3 | Status: SHIPPED | OUTPATIENT
Start: 2024-03-20

## 2024-03-20 RX ORDER — LEVOTHYROXINE SODIUM 0.15 MG/1
150 TABLET ORAL DAILY
Qty: 30 TABLET | Refills: 3 | Status: SHIPPED | OUTPATIENT
Start: 2024-03-21

## 2024-03-20 RX ORDER — LOSARTAN POTASSIUM 50 MG/1
50 TABLET ORAL DAILY
Qty: 30 TABLET | Refills: 3 | Status: SHIPPED | OUTPATIENT
Start: 2024-03-21

## 2024-03-20 RX ADMIN — Medication 30 MG: at 12:23

## 2024-03-20 RX ADMIN — BENZONATATE 200 MG: 100 CAPSULE ORAL at 13:46

## 2024-03-20 RX ADMIN — BENZONATATE 200 MG: 100 CAPSULE ORAL at 05:14

## 2024-03-20 RX ADMIN — Medication 2 PUFF: at 08:34

## 2024-03-20 RX ADMIN — LOSARTAN POTASSIUM 50 MG: 50 TABLET, FILM COATED ORAL at 09:35

## 2024-03-20 RX ADMIN — GUAIFENESIN 200 MG: 200 SOLUTION ORAL at 02:25

## 2024-03-20 RX ADMIN — ACETAMINOPHEN 650 MG: 650 SOLUTION ORAL at 20:43

## 2024-03-20 RX ADMIN — LEVOTHYROXINE SODIUM 150 MCG: 0.07 TABLET ORAL at 05:14

## 2024-03-20 RX ADMIN — BARIUM SULFATE 30 ML: 400 PASTE ORAL at 12:08

## 2024-03-20 RX ADMIN — BENZONATATE 200 MG: 100 CAPSULE ORAL at 20:43

## 2024-03-20 RX ADMIN — ENOXAPARIN SODIUM 30 MG: 100 INJECTION SUBCUTANEOUS at 09:34

## 2024-03-20 RX ADMIN — Medication 2 PUFF: at 21:54

## 2024-03-20 RX ADMIN — Medication 30 MG: at 23:18

## 2024-03-20 RX ADMIN — BARIUM SULFATE 100 ML: 0.81 POWDER, FOR SUSPENSION ORAL at 12:07

## 2024-03-20 RX ADMIN — CETIRIZINE HYDROCHLORIDE 5 MG: 10 TABLET, FILM COATED ORAL at 09:35

## 2024-03-20 RX ADMIN — SODIUM CHLORIDE, PRESERVATIVE FREE 10 ML: 5 INJECTION INTRAVENOUS at 09:35

## 2024-03-20 RX ADMIN — ACETAMINOPHEN 650 MG: 650 SOLUTION ORAL at 13:46

## 2024-03-20 RX ADMIN — SODIUM CHLORIDE, PRESERVATIVE FREE 10 ML: 5 INJECTION INTRAVENOUS at 20:46

## 2024-03-20 RX ADMIN — ENOXAPARIN SODIUM 30 MG: 100 INJECTION SUBCUTANEOUS at 20:43

## 2024-03-20 ASSESSMENT — PAIN DESCRIPTION - LOCATION: LOCATION: SHOULDER

## 2024-03-20 ASSESSMENT — PAIN DESCRIPTION - ORIENTATION: ORIENTATION: RIGHT

## 2024-03-20 ASSESSMENT — PAIN DESCRIPTION - DESCRIPTORS: DESCRIPTORS: ACHING

## 2024-03-20 ASSESSMENT — PAIN SCALES - GENERAL: PAINLEVEL_OUTOF10: 6

## 2024-03-20 NOTE — CONSULTS
[place]    Radiation Oncology Consultation    April Mayo  [id number]  1955     Diagnosis   1. 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 Currently T3N0M0, per CT      AJCC Staging has been reviewed.  History of Present Illness   Ms. Mayo is a 68 y.o. female seen in consultation at the request of Dr. Pringle to assess the role of radiation for her above diagnosis.    her oncologic history is detailed below:  69 yo admitted to [facility] for dysphonia, hoarseness.    Seen by Dr. Pringle ENT.  Largynoscopy showed mass R side larynx, extends to supraglottic larynx. R TVC fixed. Obstructive at larynx    CT neck showed 2.0 x 2.6 x 1.3 cm R larynx mass, no evidence of lymphadenopathy.    Proceeded to tracheostomy with biopsy of laryngeal mass on 3/14.  Path now returns postive for squamous cell carcinoma.    Patient remains in-patient , being evaluated for rehab.      Symptomatically, hoarsness pre-trach. No other H+N symptoms.  From a performance status standpoint, she is mildly limited.. Ms. Mayo denies a history of inflammatory bowel disease, scleroderma or other collagen vascular diseases, pacemaker/AICD, or history of prior irradiation.    Review of Systems:  A complete review of systems was obtained and negative except as described above.    Allergies and Medications     Allergies   Allergen Reactions    Codeine Other (See Comments)     NAUSEA, VOMITING        Current Outpatient Medications   Medication Instructions    acetaminophen (TYLENOL) 325 mg, Oral, EVERY 4 HOURS PRN    Budeson-Glycopyrrol-Formoterol 160-9-4.8 MCG/ACT AERO Inhalation, 2 TIMES DAILY    CALCIUM PO Only takes sometimes per pt .    levocetirizine (XYZAL) 5 mg, Oral, DAILY    telmisartan-hydroCHLOROthiazide (MICARDIS HCT) 40-12.5 MG per tablet 1 tablet, Oral, DAILY    UNITHROID 150 MCG tablet Take 1 tab Mon-Sat only. NONE on Sundays. Stop 175 mcg      Past Medical/Surgical History 
  UROLOGY CONSULT NOTE  [phone]        Patient: April Mayo MRN: [id number]  SSN: [id number]    YOB: 1955  Age: 68 y.o.  Sex: female        Subjective:      April Mayo is a 68 y.o. female who is being seen in urologic consultation for nocturia  The patient is a 69 yo female with history of hypertension history of arthritis Graves' disease status post ablation COPD who was seen by the ENT and was found to have T3 laryngeal tumor involving the right true vocal cord.   She was having SOB on exertion    She was admitted to the hospital and underwent an awake urgent tracheostomy on 3/14/2024 in am    Urology service was consulted for nocturia x 5   The patient is  seen at the bedside trach in place. She is unable to talk, frequently suctioning oral secretion. Medical records have been reviewed.  I am unable to discuss voiding history with the patient due to tracheostomy placement.    Past Medical History:   Diagnosis Date    Arthritis     Graves disease     Hypertension      Past Surgical History:   Procedure Laterality Date    BUNIONECTOMY      HYSTERECTOMY (CERVIX STATUS UNKNOWN)      HYSTERECTOMY, VAGINAL      OTHER SURGICAL HISTORY      eye surgery for graves disease      Family History   Problem Relation Age of Onset    Heart Failure Mother     Rheum Arthritis Sister     Stroke Sister     Thyroid Disease Sister      Social History     Tobacco Use    Smoking status: Former     Current packs/day: 0.00     Types: Cigarettes     Quit date: 2023     Years since quittin.5    Smokeless tobacco: Never   Substance Use Topics    Alcohol use: Never      Current Facility-Administered Medications   Medication Dose Route Frequency Provider Last Rate Last Admin    losartan (COZAAR) tablet 50 mg  50 mg Oral Daily Alessandra Carpio MD   50 mg at 24 1644    morphine (PF) injection 2 mg  2 mg IntraVENous Q4H PRN Ed Vega MD   2 mg at 24 1412    hydrALAZINE (APRESOLINE) injection 
Consult  Pulmonary, Critical Care    Name: April Mayo MRN: [id number]   : 1955 Hospital: [facility]   Date: 3/16/2024  Admission date: 3/13/2024 Hospital Day: 4       Subjective/Interval History:   Seen in the ICU postoperatively for tracheostomy placement for compromised airway from vocal cord mass.  She is having excessive cough postoperatively has been given 1 dose of morphine with marked improvement although with any movement of her neck she starts coughing again  3/15 did develop subcutaneous emphysema neck and face yesterday likely due to coughing.  She states she still having some cough when she moves but is markedly improved.  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        RECOMMENDATIONS/PLAN:   Will change Delsym and Tessalon to as needed  Will repeat chest x-ray in a.m. to follow-up on pneumomediastinum         Subjective/Initial History:   I have reviewed the flowsheet and previous  notes.     I was asked by Alessandra Carpio MD to see April Mayo a 68 y.o.   female  in consultation for a chief complaint of acute tracheostomy for right laryngeal mass with underlying COPD            Patient PCP: Lyly Bowman MD  PMH:  has a past medical history of Arthritis, Graves disease, and Hypertension.  PSH:   has a past surgical history that includes Hysterectomy; Bunionectomy; other surgical history; Hysterectomy, vaginal; and tracheostomy (N/A, 3/14/2024).   FHX: family history includes Heart 
Consult  Pulmonary, Critical Care    Name: April Mayo MRN: [id number]   : 1955 Hospital: [facility]   Date: 3/17/2024  Admission date: 3/13/2024 Hospital Day: 5       Subjective/Interval History:   Seen in the ICU postoperatively for tracheostomy placement for compromised airway from vocal cord mass.  She is having excessive cough postoperatively has been given 1 dose of morphine with marked improvement although with any movement of her neck she starts coughing again  3/15 did develop subcutaneous emphysema neck and face yesterday likely due to coughing.  She states she still having some cough when she moves but is markedly improved.  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        RECOMMENDATIONS/PLAN:   Maintain as needed Delsym and Tessalon   3/17 chest x-ray with almost no pneumomediastinum remaining and subcutaneous air in the neck is decreased as well         Subjective/Initial History:   I have reviewed the flowsheet and previous  notes.     I was asked by Alessandra Carpio MD to see April Mayo a 68 y.o.   female  in consultation for a chief complaint of acute tracheostomy for right laryngeal mass with underlying COPD            Patient PCP: Lyly Bowman MD  PMH:  has a past medical history of Arthritis, Graves disease, and Hypertension.  PSH:   has a past surgical history that includes Hysterectomy; 
Consult  Pulmonary, Critical Care    Name: April Mayo MRN: [id number]   : 1955 Hospital: [facility]   Date: 3/18/2024  Admission date: 3/13/2024 Hospital Day: 6       Subjective/Interval History:   Seen in the ICU postoperatively for tracheostomy placement for compromised airway from vocal cord mass.  She is having excessive cough postoperatively has been given 1 dose of morphine with marked improvement although with any movement of her neck she starts coughing again  3/15 did develop subcutaneous emphysema neck and face yesterday likely due to coughing.  She states she still having some cough when she moves but is markedly improved.  Denies shortness of breath or pain  3/16 minimal cough still has facial swelling and subcutaneous emphysema  3/17 minimal cough face and neck swelling has mildly improved  3/18 still complains of cough.  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        RECOMMENDATIONS/PLAN:   Still complains of cough will resume Delsym every 12 hours and leave Tessalon as every 8 hours as needed  3/17 chest x-ray with almost no pneumomediastinum remaining and subcutaneous air in the neck is decreased as well         Subjective/Initial History:   I have reviewed the flowsheet and previous  notes.     I was asked by Alessandra Carpio MD to see April Mayo a 68 y.o.   female  in consultation for a chief complaint of acute tracheostomy for right laryngeal mass with underlying 
Consult  Pulmonary, Critical Care    Name: April Mayo MRN: [id number]   : 1955 Hospital: [facility]   Date: 3/20/2024  Admission date: 3/13/2024 Hospital Day: 8       Subjective/Interval History:   Seen in the ICU postoperatively for tracheostomy placement for compromised airway from vocal cord mass.  She is having excessive cough postoperatively has been given 1 dose of morphine with marked improvement although with any movement of her neck she starts coughing again  3/15 did develop subcutaneous emphysema neck and face yesterday likely due to coughing.  She states she still having some cough when she moves but is markedly improved.  Denies shortness of breath or pain  3/16 minimal cough still has facial swelling and subcutaneous emphysema  3/17 minimal cough face and neck swelling has mildly improved  3/18 still complains of cough.  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        RECOMMENDATIONS/PLAN:   Still complains of cough will resume Delsym every 12 hours and leave Tessalon as every 8 hours as needed  3/17 chest x-ray with almost no pneumomediastinum remaining and subcutaneous air in the neck is decreased as well         Subjective/Initial History:   I have reviewed the flowsheet and previous  notes.     I was asked by Alessandra Carpio MD to see April Mayo a 68 y.o.   female  in consultation for a chief complaint of acute tracheostomy for right laryngeal mass with underlying COPD            Patient PCP: 
midline. Turbinates without hypertrophy.   Oral Cavity / Oropharynx: No trismus. Mucosa pink and moist. No lesions. Tongue is midline and mobile. Palate elevates symmetrically. Uvula midline. Tonsils unremarkable. Floor of mouth soft.   Neck: Supple. No adenopathy. Thyroid unremarkable. Palpable laryngeal landmarks. Full neck range of motion   Neurologic: CN II - XI intact. Normal gait     FLEXIBLE FIBEROPTIC LARYNGOSCOPY (3/13/24)  Findings:   Nasal Cavity: Normal nasal cavity, no polyposis or purulence.  Middle meatus clear bilaterally.   Nasopharynx: No overt masses or lesions.   Base of tongue: Vallecula & epiglottis unremarkable. Clear pyriform sinus.   TVC: Transglottic right-sided true vocal cord and false cord mass with fixation of right true vocal cord.  Subglottis: Visualized subglottis appears patent.       Labs:    Latest Reference Range & Units 02/07/24 13:51   TSH 0.450 - 4.500 uIU/mL 0.177 (L)   T4 Free 0.82 - 1.77 ng/dL 1.59   (L): Data is abnormally low    Radiology: Imaging studies independently review by me - transglottic R TVC mass, no overt cervical LAD    CT Neck:   There is masslike soft tissue in the right aspect of the larynx resulting in  greater than 50% narrowing of the airway at this level.     Preliminary report was provided by Dr. Dodson, the on-call radiologist, on  3/13/2024 at 2300 hours.     Final report to follow.    Assessment/Plan:   Assessment:   Apirl Mayo is a 68 y.o. 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      Plan:   Will plan for awake tracheostomy, direct laryngoscopy and biopsy tomorrow at 730am  Risks and benefits of surgery discussed with patient.  Risks include bleeding, damage to surrounding structures including trachea or esophagus, need for revision surgery, scar formation, and in rare cases loss of airway could result in death.  Given the patient's higher stage likely cancer not doing anything will likely result in 
1045  Last data filed at 3/14/2024 0910  Gross per 24 hour   Intake 900 ml   Output --   Net 900 ml       Last shift:      03/14 0701 - 03/14 1900  In: 900 [I.V.:900]  Out: -   Last 3 shifts: No intake/output data recorded.       Hemodynamics:    CO:    CI:    CVP:    SVR:   PAP Systolic:    PAP Diastolic:    PVR:    SV02:       Ventilator Settings:      Mode Rate TV Press PEEP FiO2 PIP Min. Vent               21 %            Physical Exam:    General:  female; lying in bed new tracheostomy some bloody secretions but mild at the trach site she was having intractable cough initially received morphine and cleared although she still has cough when she moves her head or neck  HEENT: NCAT, normal oral mucosa  Eyes: anicteric; conjunctiva clear extraocular movements intact  Neck: no nodes, no cuff leak, no accessory MM use.  Chest: no deformity,   Cardiac: Regular rate and rhythm  Lungs: Some upper airway noise at the tracheostomy site otherwise lungs are clear slightly coarse and exhalation no wheezing no rales  Abd: Overweight soft nontender normal bowel sounds  Ext: Minimal edema; no joint swelling; No clubbing  : clear urine  Neuro: Awake alert oriented moves all 4 extremities well nods her  Psych- no agitation, oriented to person;   Skin: warm, dry, no cyanosis;   Pulses: Brachial and radial pulses intact  Capillary: Normal capillary refill      Labs:    Recent Labs     03/13/24 2156   WBC 7.4   HGB 13.1        Recent Labs     03/13/24 2156 03/14/24  0500    136   K 3.7 Hemolyzed, Recollection Recommended   * 108   CO2 23 23   GLUCOSE 117* 136*   BUN 14 11   CREATININE 0.76 0.61   CALCIUM 8.6 8.7   LABALBU 3.4* 3.2*   BILITOT 0.4 0.5   AST 37 Hemolyzed, Recollection Recommended   ALT 23 22       Results       ** No results found for the last 336 hours. **           Lab Results   Component Value Date/Time    TSH 0.177 02/07/2024 01:51 PM     Recent Labs     03/13/24 2156 
Labs     03/13/24 2156 03/14/24  0500 03/14/24  1428 03/15/24  0442    136  --  136   K 3.7 Hemolyzed, Recollection Recommended 3.7 3.8   * 108  --  106   CO2 23 23  --  23   GLUCOSE 117* 136*  --  105*   BUN 14 11  --  12   CREATININE 0.76 0.61  --  0.78   CALCIUM 8.6 8.7  --  9.1   PHOS  --   --   --  3.2   LABALBU 3.4* 3.2*  --  3.2*   BILITOT 0.4 0.5  --   --    AST 37 Hemolyzed, Recollection Recommended  --   --    ALT 23 22  --   --          Results       ** No results found for the last 336 hours. **           Lab Results   Component Value Date/Time    TSH 0.177 02/07/2024 01:51 PM     Recent Labs     03/13/24 2156 03/14/24  0500 03/15/24  0442   CO2 23 23 23         ARTERIAL BLOOD GAS    Imaging:  I have personally reviewed the patient’s radiographs and have reviewed the reports:  CT SOFT TISSUE NECK W CONTRAST    Result Date: 3/14/2024  Right laryngeal mass lesion measuring 2.0 x 1.3 x 2.6 cm, suspicious for primary laryngeal cancer. Clinical correlation with direct visualization and possible tissue sampling advised. No pathologically enlarged cervical lymphadenopathy. Nonspecific lytic lesions within the C4-C6 vertebral bodies. Continued attention on follow-up advised to exclude osseous metastasis.     Discussion: Immediately postop tracheostomy placement for right laryngeal mass with some airway compromise.  She is having significant cough immediately postop had suppressed with morphine will also add Tessalon and Delsym today will give her Symbicort to substitute for her AeroBid.  No wheezing is heard will discontinue nebulized albuterol Atrovent lytic lesions on cervical spine were somewhat worrisome we will need to be evaluated eventually.  She sounds as if she also has overactive bladder will consult urology  3/15 awake alert seems calm less cough still has some bloody secretions in the trach subcutaneous emphysema may be slightly improved.  Chest x-ray shows some mediastinal emphysema.  
nods her  Psych- no agitation, oriented to person;   Skin: warm, dry, no cyanosis;   Pulses: Brachial and radial pulses intact  Capillary: Normal capillary refill      Labs:        Results       ** No results found for the last 336 hours. **           Lab Results   Component Value Date/Time    TSH 0.177 02/07/2024 01:51 PM         ARTERIAL BLOOD GAS    Imaging:  I have personally reviewed the patient’s radiographs and have reviewed the reports:  CT SOFT TISSUE NECK W CONTRAST    Result Date: 3/14/2024  Right laryngeal mass lesion measuring 2.0 x 1.3 x 2.6 cm, suspicious for primary laryngeal cancer. Clinical correlation with direct visualization and possible tissue sampling advised. No pathologically enlarged cervical lymphadenopathy. Nonspecific lytic lesions within the C4-C6 vertebral bodies. Continued attention on follow-up advised to exclude osseous metastasis.     Discussion: Immediately postop tracheostomy placement for right laryngeal mass with some airway compromise.  She is having significant cough immediately postop had suppressed with morphine will also add Tessalon and Delsym today will give her Symbicort to substitute for her AeroBid.  No wheezing is heard will discontinue nebulized albuterol Atrovent lytic lesions on cervical spine were somewhat worrisome we will need to be evaluated eventually.  She sounds as if she also has overactive bladder will consult urology  3/15 awake alert seems calm less cough still has some bloody secretions in the trach subcutaneous emphysema may be slightly improved.  Chest x-ray shows some mediastinal emphysema.  Will continue Symbicort to replace her AeroBid.  Remains on Delsym and Tessalon to suppress cough  3/16 awake alert minimal cough will change Delsym and Tessalon to as needed.  No wheezing is heard still has facial subcutaneous emphysema  3/17 awake alert minimal cough subcutaneous emphysema and pneumomediastinum are improved on x-ray  3/18 crepitus of the neck 
follow-up advised to exclude osseous metastasis.     Assessment and Plan:     Laryngeal mass involving the right vocal cord  -S/p tracheostomy and right vocal cord biopsy on 3/14  -ENT and pulmonary following  -awaiting pathology to determine treatment plan  -At least T3 disease per ENT  -No lymph node enlargement on CT neck    COPD  -pulmonary following; on Symbicort  -on cough suppressants post tracheostomy     Hypertension  -primary team managing, on losartan    Signed By: Moni Burns, TASHA - CNP     March 14, 2024          I have seen, examined and evaluated the patient with Moni Burns NP under my direct supervision. I have reviewed the note and agree with the assessment and plan of care as documented.   HPI, social history, family history, ROS, physical examination and management plan have been reviewed and verified.    Timoteo Mahajan MD.

## 2024-03-20 NOTE — PROCEDURES
PROCEDURE: TRACHEOSTOMY CHANGE (CPT 27147)    Preoperative Diagnosis: s/p tracheostomy     Postoperative Diagnosis: same    Procedure: tracheostomy change    Description of Procedure: Verbal informed consent was obtained.  Patient was reclined to a supine position with a shoulder roll placed behind her shoulders for increased neck extension.  The existing tracheostomy tube sutures were cut, and the Velcro straps were released.  The existing 6 cuffed Shiley tracheostomy was removed and replaced with a 6 uncuffed Shiley tracheostomy.  The position was confirmed by using tracheal suction to clear the trachea and bronchi of secretions.  There were no complications during the tracheostomy change.    Findings: Normal postop day 5 anterior neck tracheostomy stoma healing appropriately.  6 uncuffed Shiley tracheostomy placed in position and secured with Velcro tracheal collar

## 2024-03-20 NOTE — CARE COORDINATION
Updated clinicals have been sent. Requesting for SNF to review to  if they can accept. If able to accept, requesting insurance auth to be started.    1250: patient with discharge order, pending placement/acceptance and then auth. Requested updates from facilities.     1310: declined at [facility] due to new trach. They require patient to have trach for at least 30 days per liaison. Requesting update from [facility] and rehab.     1600: informed family no accepting facility. Plans to search in 25 mile radius to see if any can accept. Will require Auth. Also spoke about home health and explained their is no one out her way in network with insurance. Plans to follow up tomorrow after referrals to see who can accept.

## 2024-03-20 NOTE — DISCHARGE INSTRUCTIONS
Discharge Instructions       PATIENT ID: April Mayo  MRN: [id number]   YOB: 1955    DATE OF ADMISSION: 3/13/2024  DATE OF DISCHARGE: 3/20/2024    PRIMARY CARE PROVIDER: [unfilled]     ATTENDING PHYSICIAN: Alessandra Carpio MD   DISCHARGING PROVIDER: Alessandra Carpio MD    To contact this individual call [phone] and ask the  to page.   If unavailable, ask to be transferred the Adult Hospitalist Department.    DISCHARGE DIAGNOSES head and neck cancer    CONSULTATIONS: [unfilled]      PROCEDURES/SURGERIES: Procedure(s):  AWAKE URGENT TRACHEOSTOMY AND BIOPSY (LOCAL WITH ANES STANDBY)    PENDING TEST RESULTS:   At the time of discharge the following test results are still pending: None    FOLLOW UP APPOINTMENTS:   Forrest Zhang MD  [address] [facility] [id number] [phone]    Follow up on 3/27/2024  time at 1330    Lyly Bowman MD  [address] [facility] [id number] [phone]    Follow up on 3/19/2024  time at 1220    Vaishali Pringle MD  [address] [facility] [address]  [facility] [address]  [phone]    Follow up on 3/25/2024  time at 1100    Gokul Dietz MD  [address] [address]  [phone]    Follow up on 3/29/2024  Appointment on March 29, 2024 at 1:00 pm for radiation therapy consult.       ADDITIONAL CARE RECOMMENDATIONS: Patient need to follow-up with oncology radiation oncology and ENT    DIET: Resume diet as per dietitian      ACTIVITY: Activity as tolerated    Wound care: {Discharge Wound Care Instructions:03599}    EQUIPMENT needed: ***      DISCHARGE MEDICATIONS:   See Medication Reconciliation Form    It is important that you take the medication exactly as they are prescribed.   Keep your medication in the bottles provided by the pharmacist and keep a list of the medication names, dosages, and times to be taken in your wallet.   Do not take other medications without consulting your

## 2024-03-21 ENCOUNTER — APPOINTMENT (OUTPATIENT)
Facility: HOSPITAL | Age: 69
DRG: 012 | End: 2024-03-21
Payer: MEDICARE

## 2024-03-21 PROCEDURE — 73030 X-RAY EXAM OF SHOULDER: CPT

## 2024-03-21 PROCEDURE — 94761 N-INVAS EAR/PLS OXIMETRY MLT: CPT

## 2024-03-21 PROCEDURE — 97535 SELF CARE MNGMENT TRAINING: CPT

## 2024-03-21 PROCEDURE — 6370000000 HC RX 637 (ALT 250 FOR IP): Performed by: FAMILY MEDICINE

## 2024-03-21 PROCEDURE — 6370000000 HC RX 637 (ALT 250 FOR IP): Performed by: INTERNAL MEDICINE

## 2024-03-21 PROCEDURE — 2500000003 HC RX 250 WO HCPCS: Performed by: INTERNAL MEDICINE

## 2024-03-21 PROCEDURE — 94640 AIRWAY INHALATION TREATMENT: CPT

## 2024-03-21 PROCEDURE — 92526 ORAL FUNCTION THERAPY: CPT

## 2024-03-21 PROCEDURE — 97530 THERAPEUTIC ACTIVITIES: CPT

## 2024-03-21 PROCEDURE — 6360000002 HC RX W HCPCS: Performed by: FAMILY MEDICINE

## 2024-03-21 PROCEDURE — 1100000000 HC RM PRIVATE

## 2024-03-21 PROCEDURE — 2580000003 HC RX 258: Performed by: FAMILY MEDICINE

## 2024-03-21 PROCEDURE — 92507 TX SP LANG VOICE COMM INDIV: CPT

## 2024-03-21 RX ADMIN — GUAIFENESIN 200 MG: 200 SOLUTION ORAL at 20:01

## 2024-03-21 RX ADMIN — Medication 30 MG: at 10:18

## 2024-03-21 RX ADMIN — ENOXAPARIN SODIUM 30 MG: 100 INJECTION SUBCUTANEOUS at 20:01

## 2024-03-21 RX ADMIN — BENZONATATE 200 MG: 100 CAPSULE ORAL at 05:39

## 2024-03-21 RX ADMIN — ACETAMINOPHEN 650 MG: 650 SOLUTION ORAL at 20:00

## 2024-03-21 RX ADMIN — Medication 2 PUFF: at 08:10

## 2024-03-21 RX ADMIN — LOSARTAN POTASSIUM 50 MG: 50 TABLET, FILM COATED ORAL at 09:58

## 2024-03-21 RX ADMIN — ENOXAPARIN SODIUM 30 MG: 100 INJECTION SUBCUTANEOUS at 09:58

## 2024-03-21 RX ADMIN — Medication 2 PUFF: at 19:36

## 2024-03-21 RX ADMIN — SODIUM CHLORIDE, PRESERVATIVE FREE 10 ML: 5 INJECTION INTRAVENOUS at 10:18

## 2024-03-21 RX ADMIN — SODIUM CHLORIDE, PRESERVATIVE FREE 10 ML: 5 INJECTION INTRAVENOUS at 20:08

## 2024-03-21 RX ADMIN — GUAIFENESIN 200 MG: 200 SOLUTION ORAL at 15:36

## 2024-03-21 RX ADMIN — LEVOTHYROXINE SODIUM 150 MCG: 0.07 TABLET ORAL at 05:39

## 2024-03-21 RX ADMIN — BENZONATATE 200 MG: 100 CAPSULE ORAL at 12:34

## 2024-03-21 RX ADMIN — CETIRIZINE HYDROCHLORIDE 5 MG: 10 TABLET, FILM COATED ORAL at 09:58

## 2024-03-21 ASSESSMENT — PAIN DESCRIPTION - LOCATION: LOCATION: HEAD

## 2024-03-21 ASSESSMENT — PAIN - FUNCTIONAL ASSESSMENT: PAIN_FUNCTIONAL_ASSESSMENT: ACTIVITIES ARE NOT PREVENTED

## 2024-03-21 ASSESSMENT — PAIN SCALES - GENERAL
PAINLEVEL_OUTOF10: 2
PAINLEVEL_OUTOF10: 8
PAINLEVEL_OUTOF10: 6

## 2024-03-21 ASSESSMENT — PAIN DESCRIPTION - DESCRIPTORS: DESCRIPTORS: ACHING

## 2024-03-21 NOTE — CARE COORDINATION
Recommendations for next level of care have now changed to inpatient rehab.  CM met with patient and family at bedside. [place] of choice given for [facility], [facility] and [facility] inpatient rehab.  CM informed them that the patient will need insurance authorization after acceptance to the inpatient rehab facility.  Back up SNF choices have been sent and she has been accepted at 2 SNFs at this time. CM sent referrals to IRFs, pending acceptance.

## 2024-03-22 PROBLEM — R09.89 SUSPECTED DEEP VEIN THROMBOSIS (DVT): Status: ACTIVE | Noted: 2024-03-22

## 2024-03-22 PROCEDURE — 94640 AIRWAY INHALATION TREATMENT: CPT

## 2024-03-22 PROCEDURE — 2580000003 HC RX 258: Performed by: FAMILY MEDICINE

## 2024-03-22 PROCEDURE — 6360000002 HC RX W HCPCS: Performed by: FAMILY MEDICINE

## 2024-03-22 PROCEDURE — 6370000000 HC RX 637 (ALT 250 FOR IP): Performed by: INTERNAL MEDICINE

## 2024-03-22 PROCEDURE — 6370000000 HC RX 637 (ALT 250 FOR IP): Performed by: FAMILY MEDICINE

## 2024-03-22 PROCEDURE — 1100000000 HC RM PRIVATE

## 2024-03-22 PROCEDURE — 2500000003 HC RX 250 WO HCPCS: Performed by: INTERNAL MEDICINE

## 2024-03-22 PROCEDURE — 97530 THERAPEUTIC ACTIVITIES: CPT

## 2024-03-22 PROCEDURE — 94761 N-INVAS EAR/PLS OXIMETRY MLT: CPT

## 2024-03-22 PROCEDURE — 92526 ORAL FUNCTION THERAPY: CPT

## 2024-03-22 PROCEDURE — 92507 TX SP LANG VOICE COMM INDIV: CPT

## 2024-03-22 PROCEDURE — 97535 SELF CARE MNGMENT TRAINING: CPT

## 2024-03-22 RX ORDER — ACETAMINOPHEN 160 MG
TABLET,DISINTEGRATING ORAL PRN
Status: DISCONTINUED | OUTPATIENT
Start: 2024-03-22 | End: 2024-04-01 | Stop reason: HOSPADM

## 2024-03-22 RX ADMIN — SODIUM CHLORIDE, PRESERVATIVE FREE 10 ML: 5 INJECTION INTRAVENOUS at 20:34

## 2024-03-22 RX ADMIN — Medication 2 PUFF: at 08:44

## 2024-03-22 RX ADMIN — Medication 30 MG: at 10:16

## 2024-03-22 RX ADMIN — Medication 30 MG: at 00:55

## 2024-03-22 RX ADMIN — LEVOTHYROXINE SODIUM 150 MCG: 0.07 TABLET ORAL at 05:11

## 2024-03-22 RX ADMIN — CETIRIZINE HYDROCHLORIDE 5 MG: 10 TABLET, FILM COATED ORAL at 10:11

## 2024-03-22 RX ADMIN — GUAIFENESIN 200 MG: 200 SOLUTION ORAL at 14:24

## 2024-03-22 RX ADMIN — ENOXAPARIN SODIUM 30 MG: 100 INJECTION SUBCUTANEOUS at 20:34

## 2024-03-22 RX ADMIN — ENOXAPARIN SODIUM 30 MG: 100 INJECTION SUBCUTANEOUS at 10:11

## 2024-03-22 RX ADMIN — ACETAMINOPHEN 650 MG: 650 SOLUTION ORAL at 14:21

## 2024-03-22 RX ADMIN — Medication 30 MG: at 22:42

## 2024-03-22 RX ADMIN — GUAIFENESIN 200 MG: 200 SOLUTION ORAL at 20:45

## 2024-03-22 RX ADMIN — Medication 2 PUFF: at 21:11

## 2024-03-22 ASSESSMENT — PAIN SCALES - GENERAL
PAINLEVEL_OUTOF10: 4
PAINLEVEL_OUTOF10: 0

## 2024-03-22 ASSESSMENT — PAIN DESCRIPTION - LOCATION: LOCATION: KNEE

## 2024-03-22 ASSESSMENT — PAIN SCALES - WONG BAKER
WONGBAKER_NUMERICALRESPONSE: NO HURT

## 2024-03-22 ASSESSMENT — PAIN DESCRIPTION - ORIENTATION: ORIENTATION: RIGHT;LEFT

## 2024-03-22 ASSESSMENT — PAIN DESCRIPTION - DESCRIPTORS: DESCRIPTORS: ACHING;CRUSHING

## 2024-03-23 PROCEDURE — 6360000002 HC RX W HCPCS: Performed by: FAMILY MEDICINE

## 2024-03-23 PROCEDURE — 94761 N-INVAS EAR/PLS OXIMETRY MLT: CPT

## 2024-03-23 PROCEDURE — 1100000000 HC RM PRIVATE

## 2024-03-23 PROCEDURE — 6370000000 HC RX 637 (ALT 250 FOR IP): Performed by: INTERNAL MEDICINE

## 2024-03-23 PROCEDURE — 2500000003 HC RX 250 WO HCPCS: Performed by: INTERNAL MEDICINE

## 2024-03-23 PROCEDURE — 2580000003 HC RX 258: Performed by: FAMILY MEDICINE

## 2024-03-23 PROCEDURE — 94640 AIRWAY INHALATION TREATMENT: CPT

## 2024-03-23 PROCEDURE — 99232 SBSQ HOSP IP/OBS MODERATE 35: CPT | Performed by: STUDENT IN AN ORGANIZED HEALTH CARE EDUCATION/TRAINING PROGRAM

## 2024-03-23 PROCEDURE — 6370000000 HC RX 637 (ALT 250 FOR IP): Performed by: FAMILY MEDICINE

## 2024-03-23 RX ORDER — LOPERAMIDE HYDROCHLORIDE 2 MG/1
2 CAPSULE ORAL 4 TIMES DAILY PRN
Status: DISCONTINUED | OUTPATIENT
Start: 2024-03-23 | End: 2024-04-01 | Stop reason: HOSPADM

## 2024-03-23 RX ADMIN — Medication 1 LOZENGE: at 20:00

## 2024-03-23 RX ADMIN — GUAIFENESIN 200 MG: 200 SOLUTION ORAL at 09:14

## 2024-03-23 RX ADMIN — BENZONATATE 200 MG: 100 CAPSULE ORAL at 19:41

## 2024-03-23 RX ADMIN — Medication 30 MG: at 12:29

## 2024-03-23 RX ADMIN — CETIRIZINE HYDROCHLORIDE 5 MG: 10 TABLET, FILM COATED ORAL at 09:14

## 2024-03-23 RX ADMIN — Medication 2 PUFF: at 08:14

## 2024-03-23 RX ADMIN — BENZONATATE 200 MG: 100 CAPSULE ORAL at 02:36

## 2024-03-23 RX ADMIN — Medication 2 PUFF: at 21:03

## 2024-03-23 RX ADMIN — ENOXAPARIN SODIUM 30 MG: 100 INJECTION SUBCUTANEOUS at 09:14

## 2024-03-23 RX ADMIN — SODIUM CHLORIDE, PRESERVATIVE FREE 10 ML: 5 INJECTION INTRAVENOUS at 20:02

## 2024-03-23 RX ADMIN — ACETAMINOPHEN 650 MG: 650 SOLUTION ORAL at 12:29

## 2024-03-23 RX ADMIN — LOPERAMIDE HYDROCHLORIDE 2 MG: 2 CAPSULE ORAL at 13:12

## 2024-03-23 RX ADMIN — ACETAMINOPHEN 650 MG: 650 SOLUTION ORAL at 19:41

## 2024-03-23 RX ADMIN — LOPERAMIDE HYDROCHLORIDE 2 MG: 2 CAPSULE ORAL at 19:41

## 2024-03-23 RX ADMIN — GUAIFENESIN 200 MG: 200 SOLUTION ORAL at 05:28

## 2024-03-23 RX ADMIN — Medication 1 LOZENGE: at 13:29

## 2024-03-23 RX ADMIN — ENOXAPARIN SODIUM 30 MG: 100 INJECTION SUBCUTANEOUS at 19:45

## 2024-03-23 RX ADMIN — Medication 30 MG: at 23:38

## 2024-03-23 RX ADMIN — LEVOTHYROXINE SODIUM 150 MCG: 0.07 TABLET ORAL at 05:28

## 2024-03-23 RX ADMIN — GUAIFENESIN 200 MG: 200 SOLUTION ORAL at 00:44

## 2024-03-23 ASSESSMENT — PAIN DESCRIPTION - DESCRIPTORS: DESCRIPTORS: ACHING

## 2024-03-23 ASSESSMENT — PAIN DESCRIPTION - ORIENTATION: ORIENTATION: RIGHT

## 2024-03-23 ASSESSMENT — PAIN SCALES - WONG BAKER
WONGBAKER_NUMERICALRESPONSE: NO HURT

## 2024-03-23 ASSESSMENT — PAIN DESCRIPTION - LOCATION: LOCATION: NECK;SHOULDER

## 2024-03-23 ASSESSMENT — PAIN SCALES - GENERAL
PAINLEVEL_OUTOF10: 3
PAINLEVEL_OUTOF10: 0

## 2024-03-23 NOTE — CARE COORDINATION
CM reviewed. Discharge order noted. Accepted by [employer], auth started yesterday. Requesting update on auth, still pending.     ref#[id number]

## 2024-03-24 PROCEDURE — 94761 N-INVAS EAR/PLS OXIMETRY MLT: CPT

## 2024-03-24 PROCEDURE — 6360000002 HC RX W HCPCS: Performed by: FAMILY MEDICINE

## 2024-03-24 PROCEDURE — 6370000000 HC RX 637 (ALT 250 FOR IP): Performed by: FAMILY MEDICINE

## 2024-03-24 PROCEDURE — 6370000000 HC RX 637 (ALT 250 FOR IP): Performed by: INTERNAL MEDICINE

## 2024-03-24 PROCEDURE — 94640 AIRWAY INHALATION TREATMENT: CPT

## 2024-03-24 PROCEDURE — 1100000000 HC RM PRIVATE

## 2024-03-24 PROCEDURE — 2580000003 HC RX 258: Performed by: FAMILY MEDICINE

## 2024-03-24 RX ORDER — HYDROXYZINE PAMOATE 25 MG/1
25 CAPSULE ORAL NIGHTLY
Status: DISCONTINUED | OUTPATIENT
Start: 2024-03-24 | End: 2024-04-01 | Stop reason: HOSPADM

## 2024-03-24 RX ADMIN — Medication 1 LOZENGE: at 01:00

## 2024-03-24 RX ADMIN — ENOXAPARIN SODIUM 30 MG: 100 INJECTION SUBCUTANEOUS at 21:37

## 2024-03-24 RX ADMIN — LEVOTHYROXINE SODIUM 150 MCG: 0.07 TABLET ORAL at 05:02

## 2024-03-24 RX ADMIN — HYDROXYZINE PAMOATE 25 MG: 25 CAPSULE ORAL at 21:37

## 2024-03-24 RX ADMIN — Medication 2 PUFF: at 22:47

## 2024-03-24 RX ADMIN — LOSARTAN POTASSIUM 50 MG: 50 TABLET, FILM COATED ORAL at 08:34

## 2024-03-24 RX ADMIN — BENZONATATE 200 MG: 100 CAPSULE ORAL at 10:38

## 2024-03-24 RX ADMIN — BENZONATATE 200 MG: 100 CAPSULE ORAL at 04:41

## 2024-03-24 RX ADMIN — Medication 1 LOZENGE: at 10:39

## 2024-03-24 RX ADMIN — Medication 30 MG: at 21:43

## 2024-03-24 RX ADMIN — Medication 1 LOZENGE: at 05:10

## 2024-03-24 RX ADMIN — ACETAMINOPHEN 650 MG: 650 SOLUTION ORAL at 16:36

## 2024-03-24 RX ADMIN — ACETAMINOPHEN 650 MG: 650 SOLUTION ORAL at 04:54

## 2024-03-24 RX ADMIN — Medication 2 PUFF: at 07:43

## 2024-03-24 RX ADMIN — CETIRIZINE HYDROCHLORIDE 5 MG: 10 TABLET, FILM COATED ORAL at 08:33

## 2024-03-24 RX ADMIN — ENOXAPARIN SODIUM 30 MG: 100 INJECTION SUBCUTANEOUS at 08:34

## 2024-03-24 RX ADMIN — SODIUM CHLORIDE, PRESERVATIVE FREE 10 ML: 5 INJECTION INTRAVENOUS at 21:38

## 2024-03-24 RX ADMIN — ACETAMINOPHEN 650 MG: 650 SOLUTION ORAL at 21:39

## 2024-03-24 RX ADMIN — ACETAMINOPHEN 650 MG: 650 SOLUTION ORAL at 10:39

## 2024-03-24 RX ADMIN — BENZONATATE 200 MG: 100 CAPSULE ORAL at 18:16

## 2024-03-24 RX ADMIN — Medication 1 LOZENGE: at 14:45

## 2024-03-24 RX ADMIN — Medication 30 MG: at 12:28

## 2024-03-24 ASSESSMENT — PAIN DESCRIPTION - LOCATION
LOCATION: THROAT
LOCATION: NECK;HEAD
LOCATION: THROAT
LOCATION: HEAD
LOCATION: FACE

## 2024-03-24 ASSESSMENT — PAIN SCALES - GENERAL
PAINLEVEL_OUTOF10: 0
PAINLEVEL_OUTOF10: 5
PAINLEVEL_OUTOF10: 2
PAINLEVEL_OUTOF10: 0
PAINLEVEL_OUTOF10: 2
PAINLEVEL_OUTOF10: 0
PAINLEVEL_OUTOF10: 3
PAINLEVEL_OUTOF10: 6

## 2024-03-24 ASSESSMENT — PAIN DESCRIPTION - DESCRIPTORS
DESCRIPTORS: ACHING
DESCRIPTORS: SORE
DESCRIPTORS: ACHING
DESCRIPTORS: ACHING
DESCRIPTORS: SORE

## 2024-03-24 ASSESSMENT — PAIN - FUNCTIONAL ASSESSMENT
PAIN_FUNCTIONAL_ASSESSMENT: ACTIVITIES ARE NOT PREVENTED

## 2024-03-24 ASSESSMENT — PAIN DESCRIPTION - ORIENTATION
ORIENTATION: RIGHT
ORIENTATION: ANTERIOR
ORIENTATION: RIGHT
ORIENTATION: ANTERIOR

## 2024-03-24 ASSESSMENT — PAIN SCALES - WONG BAKER: WONGBAKER_NUMERICALRESPONSE: NO HURT

## 2024-03-24 NOTE — CARE COORDINATION
03/24/24 0926   Avoidable Days   [facility]or Acute Rehab Auth     The pt is expected to DC to [facility] pending insurance authorization.  Request for status faxed via [email or web address] to [facility].  Will update as information becomes available.

## 2024-03-25 PROBLEM — R09.89 SUSPECTED DEEP VEIN THROMBOSIS (DVT): Status: RESOLVED | Noted: 2024-03-22 | Resolved: 2024-03-25

## 2024-03-25 LAB
ALBUMIN SERPL-MCNC: 2.8 G/DL (ref 3.5–5)
ALBUMIN/GLOB SERPL: 0.5 (ref 1.1–2.2)
ALP SERPL-CCNC: 85 U/L (ref 45–117)
ALT SERPL-CCNC: 28 U/L (ref 12–78)
ANION GAP SERPL CALC-SCNC: 4 MMOL/L (ref 5–15)
AST SERPL W P-5'-P-CCNC: 12 U/L (ref 15–37)
BILIRUB SERPL-MCNC: 0.4 MG/DL (ref 0.2–1)
BUN SERPL-MCNC: 11 MG/DL (ref 6–20)
BUN/CREAT SERPL: 14 (ref 12–20)
CA-I BLD-MCNC: 9.2 MG/DL (ref 8.5–10.1)
CHLORIDE SERPL-SCNC: 111 MMOL/L (ref 97–108)
CO2 SERPL-SCNC: 24 MMOL/L (ref 21–32)
CREAT SERPL-MCNC: 0.76 MG/DL (ref 0.55–1.02)
ERYTHROCYTE [DISTWIDTH] IN BLOOD BY AUTOMATED COUNT: 13.5 % (ref 11.5–14.5)
GLOBULIN SER CALC-MCNC: 5.4 G/DL (ref 2–4)
GLUCOSE SERPL-MCNC: 101 MG/DL (ref 65–100)
HCT VFR BLD AUTO: 43.7 % (ref 35–47)
HGB BLD-MCNC: 13.5 G/DL (ref 11.5–16)
MCH RBC QN AUTO: 29.9 PG (ref 26–34)
MCHC RBC AUTO-ENTMCNC: 30.9 G/DL (ref 30–36.5)
MCV RBC AUTO: 96.9 FL (ref 80–99)
NRBC # BLD: 0 K/UL (ref 0–0.01)
NRBC BLD-RTO: 0 PER 100 WBC
PLATELET # BLD AUTO: 262 K/UL (ref 150–400)
PMV BLD AUTO: 10.1 FL (ref 8.9–12.9)
POTASSIUM SERPL-SCNC: 3.8 MMOL/L (ref 3.5–5.1)
PROT SERPL-MCNC: 8.2 G/DL (ref 6.4–8.2)
RBC # BLD AUTO: 4.51 M/UL (ref 3.8–5.2)
SODIUM SERPL-SCNC: 139 MMOL/L (ref 136–145)
WBC # BLD AUTO: 6.7 K/UL (ref 3.6–11)

## 2024-03-25 PROCEDURE — 94761 N-INVAS EAR/PLS OXIMETRY MLT: CPT

## 2024-03-25 PROCEDURE — 85027 COMPLETE CBC AUTOMATED: CPT

## 2024-03-25 PROCEDURE — 92526 ORAL FUNCTION THERAPY: CPT

## 2024-03-25 PROCEDURE — 6370000000 HC RX 637 (ALT 250 FOR IP): Performed by: INTERNAL MEDICINE

## 2024-03-25 PROCEDURE — 97530 THERAPEUTIC ACTIVITIES: CPT

## 2024-03-25 PROCEDURE — 6360000002 HC RX W HCPCS: Performed by: FAMILY MEDICINE

## 2024-03-25 PROCEDURE — 2580000003 HC RX 258: Performed by: FAMILY MEDICINE

## 2024-03-25 PROCEDURE — 92507 TX SP LANG VOICE COMM INDIV: CPT

## 2024-03-25 PROCEDURE — 36415 COLL VENOUS BLD VENIPUNCTURE: CPT

## 2024-03-25 PROCEDURE — 1100000000 HC RM PRIVATE

## 2024-03-25 PROCEDURE — 6370000000 HC RX 637 (ALT 250 FOR IP): Performed by: FAMILY MEDICINE

## 2024-03-25 PROCEDURE — 97116 GAIT TRAINING THERAPY: CPT

## 2024-03-25 PROCEDURE — 80053 COMPREHEN METABOLIC PANEL: CPT

## 2024-03-25 PROCEDURE — 94640 AIRWAY INHALATION TREATMENT: CPT

## 2024-03-25 RX ORDER — HYDROXYZINE PAMOATE 25 MG/1
25 CAPSULE ORAL NIGHTLY
Qty: 14 CAPSULE | Refills: 0 | Status: SHIPPED | OUTPATIENT
Start: 2024-03-25 | End: 2024-04-08

## 2024-03-25 RX ORDER — BENZONATATE 100 MG/1
100 CAPSULE ORAL 3 TIMES DAILY PRN
Status: DISCONTINUED | OUTPATIENT
Start: 2024-03-25 | End: 2024-04-01 | Stop reason: HOSPADM

## 2024-03-25 RX ORDER — LOPERAMIDE HYDROCHLORIDE 2 MG/1
2 CAPSULE ORAL 4 TIMES DAILY PRN
Qty: 15 CAPSULE | Refills: 0 | Status: SHIPPED | OUTPATIENT
Start: 2024-03-25 | End: 2024-04-04

## 2024-03-25 RX ORDER — BENZONATATE 100 MG/1
100 CAPSULE ORAL 3 TIMES DAILY PRN
Qty: 30 CAPSULE | Refills: 0 | Status: SHIPPED | OUTPATIENT
Start: 2024-03-25 | End: 2024-04-01

## 2024-03-25 RX ADMIN — ACETAMINOPHEN 650 MG: 650 SOLUTION ORAL at 22:19

## 2024-03-25 RX ADMIN — SODIUM CHLORIDE, PRESERVATIVE FREE 10 ML: 5 INJECTION INTRAVENOUS at 22:20

## 2024-03-25 RX ADMIN — ENOXAPARIN SODIUM 30 MG: 100 INJECTION SUBCUTANEOUS at 09:06

## 2024-03-25 RX ADMIN — Medication 2 PUFF: at 21:19

## 2024-03-25 RX ADMIN — Medication 2 PUFF: at 08:26

## 2024-03-25 RX ADMIN — BENZONATATE 200 MG: 100 CAPSULE ORAL at 03:27

## 2024-03-25 RX ADMIN — CETIRIZINE HYDROCHLORIDE 5 MG: 10 TABLET, FILM COATED ORAL at 09:06

## 2024-03-25 RX ADMIN — BENZONATATE 100 MG: 100 CAPSULE ORAL at 13:05

## 2024-03-25 RX ADMIN — Medication 1 LOZENGE: at 13:43

## 2024-03-25 RX ADMIN — ENOXAPARIN SODIUM 30 MG: 100 INJECTION SUBCUTANEOUS at 22:19

## 2024-03-25 RX ADMIN — Medication 30 MG: at 17:34

## 2024-03-25 RX ADMIN — ACETAMINOPHEN 650 MG: 650 SOLUTION ORAL at 15:37

## 2024-03-25 RX ADMIN — Medication 30 MG: at 22:19

## 2024-03-25 RX ADMIN — LOSARTAN POTASSIUM 50 MG: 50 TABLET, FILM COATED ORAL at 09:06

## 2024-03-25 RX ADMIN — Medication 30 MG: at 06:05

## 2024-03-25 RX ADMIN — HYDROXYZINE PAMOATE 25 MG: 25 CAPSULE ORAL at 22:19

## 2024-03-25 RX ADMIN — LEVOTHYROXINE SODIUM 150 MCG: 0.07 TABLET ORAL at 06:03

## 2024-03-25 RX ADMIN — BENZONATATE 200 MG: 100 CAPSULE ORAL at 09:12

## 2024-03-25 ASSESSMENT — PAIN SCALES - GENERAL
PAINLEVEL_OUTOF10: 0
PAINLEVEL_OUTOF10: 3
PAINLEVEL_OUTOF10: 3

## 2024-03-25 ASSESSMENT — PAIN DESCRIPTION - LOCATION
LOCATION: HEAD;NECK
LOCATION: HEAD

## 2024-03-25 ASSESSMENT — PAIN - FUNCTIONAL ASSESSMENT: PAIN_FUNCTIONAL_ASSESSMENT: ACTIVITIES ARE NOT PREVENTED

## 2024-03-25 ASSESSMENT — PAIN SCALES - WONG BAKER: WONGBAKER_NUMERICALRESPONSE: NO HURT

## 2024-03-25 ASSESSMENT — PAIN DESCRIPTION - DESCRIPTORS
DESCRIPTORS: ACHING;CRUSHING
DESCRIPTORS: ACHING

## 2024-03-25 NOTE — CARE COORDINATION
CM reviewed chart. Patient with discharge order. Currently pending insurance auth to [facility] and rehab. Requested update for auth.    1354: insuarnce offering peer to peer for IRF;     Pete communication:  Hi there, Please have MD call Cristina for a p2p.. Deadline is 5:45pm today.. Have MD call [phone] and provide patients name, member ID , and .. they also ask that the MD calling has seen the patient as well.. If p2p is not done, Cristina will deny... thanks!     Information provided to attending with request for peer to peer.

## 2024-03-26 ENCOUNTER — TRANSCRIBE ORDERS (OUTPATIENT)
Facility: HOSPITAL | Age: 69
End: 2024-03-26

## 2024-03-26 DIAGNOSIS — C32.8 CANCER OF OVERLAPPING SITES OF LARYNX (HCC): Primary | ICD-10-CM

## 2024-03-26 PROCEDURE — 6360000002 HC RX W HCPCS: Performed by: FAMILY MEDICINE

## 2024-03-26 PROCEDURE — 97530 THERAPEUTIC ACTIVITIES: CPT

## 2024-03-26 PROCEDURE — 6370000000 HC RX 637 (ALT 250 FOR IP): Performed by: FAMILY MEDICINE

## 2024-03-26 PROCEDURE — 2580000003 HC RX 258: Performed by: FAMILY MEDICINE

## 2024-03-26 PROCEDURE — 6370000000 HC RX 637 (ALT 250 FOR IP): Performed by: INTERNAL MEDICINE

## 2024-03-26 PROCEDURE — 94761 N-INVAS EAR/PLS OXIMETRY MLT: CPT

## 2024-03-26 PROCEDURE — 94640 AIRWAY INHALATION TREATMENT: CPT

## 2024-03-26 PROCEDURE — 1100000000 HC RM PRIVATE

## 2024-03-26 PROCEDURE — 92507 TX SP LANG VOICE COMM INDIV: CPT

## 2024-03-26 PROCEDURE — 92526 ORAL FUNCTION THERAPY: CPT

## 2024-03-26 RX ADMIN — BENZONATATE 100 MG: 100 CAPSULE ORAL at 13:15

## 2024-03-26 RX ADMIN — LOSARTAN POTASSIUM 50 MG: 50 TABLET, FILM COATED ORAL at 08:51

## 2024-03-26 RX ADMIN — SODIUM CHLORIDE, PRESERVATIVE FREE 10 ML: 5 INJECTION INTRAVENOUS at 09:13

## 2024-03-26 RX ADMIN — Medication 1 LOZENGE: at 15:28

## 2024-03-26 RX ADMIN — ACETAMINOPHEN 650 MG: 650 SOLUTION ORAL at 19:10

## 2024-03-26 RX ADMIN — ENOXAPARIN SODIUM 30 MG: 100 INJECTION SUBCUTANEOUS at 08:51

## 2024-03-26 RX ADMIN — CETIRIZINE HYDROCHLORIDE 5 MG: 10 TABLET, FILM COATED ORAL at 08:51

## 2024-03-26 RX ADMIN — BENZONATATE 100 MG: 100 CAPSULE ORAL at 02:49

## 2024-03-26 RX ADMIN — ENOXAPARIN SODIUM 30 MG: 100 INJECTION SUBCUTANEOUS at 20:19

## 2024-03-26 RX ADMIN — Medication 30 MG: at 21:51

## 2024-03-26 RX ADMIN — BENZONATATE 100 MG: 100 CAPSULE ORAL at 18:29

## 2024-03-26 RX ADMIN — Medication 2 PUFF: at 07:57

## 2024-03-26 RX ADMIN — SODIUM CHLORIDE, PRESERVATIVE FREE 10 ML: 5 INJECTION INTRAVENOUS at 22:01

## 2024-03-26 RX ADMIN — HYDROXYZINE PAMOATE 25 MG: 25 CAPSULE ORAL at 21:50

## 2024-03-26 RX ADMIN — Medication 2 PUFF: at 19:45

## 2024-03-26 RX ADMIN — ACETAMINOPHEN 650 MG: 650 SOLUTION ORAL at 13:15

## 2024-03-26 RX ADMIN — LEVOTHYROXINE SODIUM 150 MCG: 0.07 TABLET ORAL at 07:30

## 2024-03-26 RX ADMIN — Medication 30 MG: at 10:24

## 2024-03-26 ASSESSMENT — PAIN SCALES - GENERAL
PAINLEVEL_OUTOF10: 3
PAINLEVEL_OUTOF10: 3

## 2024-03-26 ASSESSMENT — PAIN DESCRIPTION - ORIENTATION: ORIENTATION: MID

## 2024-03-26 ASSESSMENT — PAIN DESCRIPTION - LOCATION
LOCATION: HEAD
LOCATION: HEAD

## 2024-03-26 ASSESSMENT — PAIN - FUNCTIONAL ASSESSMENT
PAIN_FUNCTIONAL_ASSESSMENT: ACTIVITIES ARE NOT PREVENTED
PAIN_FUNCTIONAL_ASSESSMENT: PREVENTS OR INTERFERES SOME ACTIVE ACTIVITIES AND ADLS

## 2024-03-26 ASSESSMENT — PAIN DESCRIPTION - DESCRIPTORS
DESCRIPTORS: ACHING
DESCRIPTORS: ACHING

## 2024-03-26 NOTE — CARE COORDINATION
Provider unable to do peer to peer due to insurance not answering doctor after verifying info. Confirmed with insurance company. Requesting another peer to peer. Insurance company denied stating they have already declined services and we would need to do a fast appeal.     Spoke with patient and daughter and explained what is going on with peer to peer. They would like to do fast appeal process.     Call being made to service line ([phone]) fax ([phone]). Requesting fast appeal.

## 2024-03-27 PROCEDURE — 6360000002 HC RX W HCPCS: Performed by: FAMILY MEDICINE

## 2024-03-27 PROCEDURE — 2700000000 HC OXYGEN THERAPY PER DAY

## 2024-03-27 PROCEDURE — 97530 THERAPEUTIC ACTIVITIES: CPT

## 2024-03-27 PROCEDURE — 2580000003 HC RX 258: Performed by: FAMILY MEDICINE

## 2024-03-27 PROCEDURE — 6370000000 HC RX 637 (ALT 250 FOR IP): Performed by: INTERNAL MEDICINE

## 2024-03-27 PROCEDURE — 1100000000 HC RM PRIVATE

## 2024-03-27 PROCEDURE — 6370000000 HC RX 637 (ALT 250 FOR IP): Performed by: FAMILY MEDICINE

## 2024-03-27 PROCEDURE — 94761 N-INVAS EAR/PLS OXIMETRY MLT: CPT

## 2024-03-27 PROCEDURE — 94640 AIRWAY INHALATION TREATMENT: CPT

## 2024-03-27 RX ADMIN — LEVOTHYROXINE SODIUM 150 MCG: 0.07 TABLET ORAL at 06:42

## 2024-03-27 RX ADMIN — Medication 30 MG: at 22:39

## 2024-03-27 RX ADMIN — ACETAMINOPHEN 650 MG: 650 SOLUTION ORAL at 14:56

## 2024-03-27 RX ADMIN — LOSARTAN POTASSIUM 50 MG: 50 TABLET, FILM COATED ORAL at 09:15

## 2024-03-27 RX ADMIN — BENZONATATE 100 MG: 100 CAPSULE ORAL at 02:37

## 2024-03-27 RX ADMIN — Medication 30 MG: at 11:39

## 2024-03-27 RX ADMIN — LOPERAMIDE HYDROCHLORIDE 2 MG: 2 CAPSULE ORAL at 11:39

## 2024-03-27 RX ADMIN — Medication 2 PUFF: at 08:23

## 2024-03-27 RX ADMIN — BENZONATATE 100 MG: 100 CAPSULE ORAL at 18:23

## 2024-03-27 RX ADMIN — BENZONATATE 100 MG: 100 CAPSULE ORAL at 10:15

## 2024-03-27 RX ADMIN — SODIUM CHLORIDE, PRESERVATIVE FREE 10 ML: 5 INJECTION INTRAVENOUS at 21:20

## 2024-03-27 RX ADMIN — CETIRIZINE HYDROCHLORIDE 5 MG: 10 TABLET, FILM COATED ORAL at 09:14

## 2024-03-27 RX ADMIN — ENOXAPARIN SODIUM 30 MG: 100 INJECTION SUBCUTANEOUS at 09:17

## 2024-03-27 RX ADMIN — Medication 2 PUFF: at 21:55

## 2024-03-27 RX ADMIN — ENOXAPARIN SODIUM 30 MG: 100 INJECTION SUBCUTANEOUS at 21:20

## 2024-03-27 RX ADMIN — HYDROXYZINE PAMOATE 25 MG: 25 CAPSULE ORAL at 21:20

## 2024-03-27 RX ADMIN — SODIUM CHLORIDE, PRESERVATIVE FREE 10 ML: 5 INJECTION INTRAVENOUS at 09:20

## 2024-03-27 ASSESSMENT — PAIN DESCRIPTION - DESCRIPTORS: DESCRIPTORS: ACHING

## 2024-03-27 ASSESSMENT — PAIN SCALES - GENERAL
PAINLEVEL_OUTOF10: 3
PAINLEVEL_OUTOF10: 0

## 2024-03-27 ASSESSMENT — PAIN DESCRIPTION - LOCATION: LOCATION: HEAD

## 2024-03-27 ASSESSMENT — PAIN - FUNCTIONAL ASSESSMENT: PAIN_FUNCTIONAL_ASSESSMENT: PREVENTS OR INTERFERES SOME ACTIVE ACTIVITIES AND ADLS

## 2024-03-27 ASSESSMENT — PAIN DESCRIPTION - ORIENTATION: ORIENTATION: MID

## 2024-03-27 NOTE — CARE COORDINATION
CM met with family at bedside. Got appeal from signed for appealing insurance decision. Discussed with family about back up plans for home. Enuring about equipment needed for home. Reaching out to PT to discuss needs at home for equipment. Tom need 3 in 1 bedside commode and rolling walker and discussed with family.    Appeal form faxed to [phone]

## 2024-03-28 PROCEDURE — 94640 AIRWAY INHALATION TREATMENT: CPT

## 2024-03-28 PROCEDURE — 6370000000 HC RX 637 (ALT 250 FOR IP): Performed by: FAMILY MEDICINE

## 2024-03-28 PROCEDURE — 97530 THERAPEUTIC ACTIVITIES: CPT

## 2024-03-28 PROCEDURE — 6360000002 HC RX W HCPCS: Performed by: FAMILY MEDICINE

## 2024-03-28 PROCEDURE — 94761 N-INVAS EAR/PLS OXIMETRY MLT: CPT

## 2024-03-28 PROCEDURE — 6370000000 HC RX 637 (ALT 250 FOR IP): Performed by: INTERNAL MEDICINE

## 2024-03-28 PROCEDURE — 2580000003 HC RX 258: Performed by: FAMILY MEDICINE

## 2024-03-28 PROCEDURE — 1100000000 HC RM PRIVATE

## 2024-03-28 RX ADMIN — CETIRIZINE HYDROCHLORIDE 5 MG: 10 TABLET, FILM COATED ORAL at 09:31

## 2024-03-28 RX ADMIN — Medication 30 MG: at 23:13

## 2024-03-28 RX ADMIN — ACETAMINOPHEN 650 MG: 650 SOLUTION ORAL at 16:22

## 2024-03-28 RX ADMIN — Medication 30 MG: at 11:21

## 2024-03-28 RX ADMIN — ENOXAPARIN SODIUM 30 MG: 100 INJECTION SUBCUTANEOUS at 09:31

## 2024-03-28 RX ADMIN — ENOXAPARIN SODIUM 30 MG: 100 INJECTION SUBCUTANEOUS at 20:43

## 2024-03-28 RX ADMIN — BENZONATATE 100 MG: 100 CAPSULE ORAL at 09:31

## 2024-03-28 RX ADMIN — LOSARTAN POTASSIUM 50 MG: 50 TABLET, FILM COATED ORAL at 09:31

## 2024-03-28 RX ADMIN — BENZONATATE 100 MG: 100 CAPSULE ORAL at 03:06

## 2024-03-28 RX ADMIN — Medication 2 PUFF: at 21:05

## 2024-03-28 RX ADMIN — Medication 2 PUFF: at 09:51

## 2024-03-28 RX ADMIN — LEVOTHYROXINE SODIUM 150 MCG: 0.07 TABLET ORAL at 06:32

## 2024-03-28 RX ADMIN — HYDROXYZINE PAMOATE 25 MG: 25 CAPSULE ORAL at 20:43

## 2024-03-28 RX ADMIN — SODIUM CHLORIDE, PRESERVATIVE FREE 10 ML: 5 INJECTION INTRAVENOUS at 21:03

## 2024-03-28 RX ADMIN — BENZONATATE 100 MG: 100 CAPSULE ORAL at 18:09

## 2024-03-28 RX ADMIN — SODIUM CHLORIDE, PRESERVATIVE FREE 10 ML: 5 INJECTION INTRAVENOUS at 09:31

## 2024-03-28 ASSESSMENT — PAIN - FUNCTIONAL ASSESSMENT: PAIN_FUNCTIONAL_ASSESSMENT: ACTIVITIES ARE NOT PREVENTED

## 2024-03-28 ASSESSMENT — PAIN DESCRIPTION - DESCRIPTORS: DESCRIPTORS: ACHING

## 2024-03-28 ASSESSMENT — PAIN SCALES - GENERAL
PAINLEVEL_OUTOF10: 1
PAINLEVEL_OUTOF10: 3

## 2024-03-28 ASSESSMENT — PAIN DESCRIPTION - ORIENTATION: ORIENTATION: LOWER

## 2024-03-29 PROCEDURE — 2700000000 HC OXYGEN THERAPY PER DAY

## 2024-03-29 PROCEDURE — 92526 ORAL FUNCTION THERAPY: CPT

## 2024-03-29 PROCEDURE — 94761 N-INVAS EAR/PLS OXIMETRY MLT: CPT

## 2024-03-29 PROCEDURE — 92507 TX SP LANG VOICE COMM INDIV: CPT

## 2024-03-29 PROCEDURE — 6370000000 HC RX 637 (ALT 250 FOR IP): Performed by: INTERNAL MEDICINE

## 2024-03-29 PROCEDURE — 1100000000 HC RM PRIVATE

## 2024-03-29 PROCEDURE — 97530 THERAPEUTIC ACTIVITIES: CPT

## 2024-03-29 PROCEDURE — 6360000002 HC RX W HCPCS: Performed by: FAMILY MEDICINE

## 2024-03-29 PROCEDURE — 94640 AIRWAY INHALATION TREATMENT: CPT

## 2024-03-29 PROCEDURE — 6370000000 HC RX 637 (ALT 250 FOR IP): Performed by: FAMILY MEDICINE

## 2024-03-29 PROCEDURE — 2580000003 HC RX 258: Performed by: FAMILY MEDICINE

## 2024-03-29 RX ADMIN — BENZONATATE 100 MG: 100 CAPSULE ORAL at 16:23

## 2024-03-29 RX ADMIN — BENZONATATE 100 MG: 100 CAPSULE ORAL at 23:04

## 2024-03-29 RX ADMIN — LOSARTAN POTASSIUM 50 MG: 50 TABLET, FILM COATED ORAL at 10:05

## 2024-03-29 RX ADMIN — SODIUM CHLORIDE, PRESERVATIVE FREE 10 ML: 5 INJECTION INTRAVENOUS at 21:05

## 2024-03-29 RX ADMIN — ENOXAPARIN SODIUM 30 MG: 100 INJECTION SUBCUTANEOUS at 21:05

## 2024-03-29 RX ADMIN — ACETAMINOPHEN 650 MG: 650 SOLUTION ORAL at 04:48

## 2024-03-29 RX ADMIN — Medication 2 PUFF: at 19:54

## 2024-03-29 RX ADMIN — CETIRIZINE HYDROCHLORIDE 5 MG: 10 TABLET, FILM COATED ORAL at 10:01

## 2024-03-29 RX ADMIN — Medication 30 MG: at 23:04

## 2024-03-29 RX ADMIN — LEVOTHYROXINE SODIUM 150 MCG: 0.07 TABLET ORAL at 06:05

## 2024-03-29 RX ADMIN — Medication 30 MG: at 12:06

## 2024-03-29 RX ADMIN — SODIUM CHLORIDE, PRESERVATIVE FREE 10 ML: 5 INJECTION INTRAVENOUS at 10:04

## 2024-03-29 RX ADMIN — BENZONATATE 100 MG: 100 CAPSULE ORAL at 04:35

## 2024-03-29 RX ADMIN — ENOXAPARIN SODIUM 30 MG: 100 INJECTION SUBCUTANEOUS at 10:01

## 2024-03-29 RX ADMIN — HYDROXYZINE PAMOATE 25 MG: 25 CAPSULE ORAL at 21:05

## 2024-03-29 RX ADMIN — Medication 2 PUFF: at 07:22

## 2024-03-29 RX ADMIN — ACETAMINOPHEN 650 MG: 650 SOLUTION ORAL at 16:23

## 2024-03-29 ASSESSMENT — PAIN DESCRIPTION - ORIENTATION
ORIENTATION: POSTERIOR
ORIENTATION: RIGHT

## 2024-03-29 ASSESSMENT — PAIN SCALES - GENERAL
PAINLEVEL_OUTOF10: 5
PAINLEVEL_OUTOF10: 0
PAINLEVEL_OUTOF10: 5

## 2024-03-29 ASSESSMENT — PAIN DESCRIPTION - LOCATION
LOCATION: BACK
LOCATION: SHOULDER;HEAD

## 2024-03-29 ASSESSMENT — PAIN - FUNCTIONAL ASSESSMENT: PAIN_FUNCTIONAL_ASSESSMENT: ACTIVITIES ARE NOT PREVENTED

## 2024-03-29 ASSESSMENT — PAIN DESCRIPTION - DESCRIPTORS
DESCRIPTORS: ACHING
DESCRIPTORS: SORE

## 2024-03-29 NOTE — CARE COORDINATION
CM reviewed chart. Awaiting appeal decisions for insurance approval for IRF at [facility].     If denied plans are to discharge home. Trach supplies has been ordered and approved but cannot deliver until patient is actively going home . Rolling walker and 3 in 1 bedside commode order today as well.

## 2024-03-30 PROCEDURE — 6370000000 HC RX 637 (ALT 250 FOR IP): Performed by: INTERNAL MEDICINE

## 2024-03-30 PROCEDURE — 94761 N-INVAS EAR/PLS OXIMETRY MLT: CPT

## 2024-03-30 PROCEDURE — 2580000003 HC RX 258: Performed by: FAMILY MEDICINE

## 2024-03-30 PROCEDURE — 97530 THERAPEUTIC ACTIVITIES: CPT

## 2024-03-30 PROCEDURE — 6370000000 HC RX 637 (ALT 250 FOR IP): Performed by: FAMILY MEDICINE

## 2024-03-30 PROCEDURE — 94640 AIRWAY INHALATION TREATMENT: CPT

## 2024-03-30 PROCEDURE — 1100000000 HC RM PRIVATE

## 2024-03-30 PROCEDURE — 6360000002 HC RX W HCPCS: Performed by: FAMILY MEDICINE

## 2024-03-30 PROCEDURE — 2700000000 HC OXYGEN THERAPY PER DAY

## 2024-03-30 RX ADMIN — Medication 30 MG: at 22:37

## 2024-03-30 RX ADMIN — CETIRIZINE HYDROCHLORIDE 5 MG: 10 TABLET, FILM COATED ORAL at 08:37

## 2024-03-30 RX ADMIN — Medication 2 PUFF: at 08:27

## 2024-03-30 RX ADMIN — SODIUM CHLORIDE, PRESERVATIVE FREE 10 ML: 5 INJECTION INTRAVENOUS at 22:38

## 2024-03-30 RX ADMIN — HYDROXYZINE PAMOATE 25 MG: 25 CAPSULE ORAL at 22:37

## 2024-03-30 RX ADMIN — ENOXAPARIN SODIUM 30 MG: 100 INJECTION SUBCUTANEOUS at 22:37

## 2024-03-30 RX ADMIN — Medication 30 MG: at 11:36

## 2024-03-30 RX ADMIN — ENOXAPARIN SODIUM 30 MG: 100 INJECTION SUBCUTANEOUS at 08:38

## 2024-03-30 RX ADMIN — Medication 2 PUFF: at 19:50

## 2024-03-30 RX ADMIN — LEVOTHYROXINE SODIUM 150 MCG: 0.07 TABLET ORAL at 06:25

## 2024-03-30 RX ADMIN — BENZONATATE 100 MG: 100 CAPSULE ORAL at 22:38

## 2024-03-30 ASSESSMENT — PAIN SCALES - GENERAL
PAINLEVEL_OUTOF10: 0
PAINLEVEL_OUTOF10: 0

## 2024-03-30 ASSESSMENT — PAIN SCALES - WONG BAKER: WONGBAKER_NUMERICALRESPONSE: NO HURT

## 2024-03-31 PROCEDURE — 6370000000 HC RX 637 (ALT 250 FOR IP): Performed by: FAMILY MEDICINE

## 2024-03-31 PROCEDURE — 2580000003 HC RX 258: Performed by: FAMILY MEDICINE

## 2024-03-31 PROCEDURE — 6370000000 HC RX 637 (ALT 250 FOR IP): Performed by: INTERNAL MEDICINE

## 2024-03-31 PROCEDURE — 94761 N-INVAS EAR/PLS OXIMETRY MLT: CPT

## 2024-03-31 PROCEDURE — 2700000000 HC OXYGEN THERAPY PER DAY

## 2024-03-31 PROCEDURE — 6360000002 HC RX W HCPCS: Performed by: FAMILY MEDICINE

## 2024-03-31 PROCEDURE — 1100000000 HC RM PRIVATE

## 2024-03-31 PROCEDURE — 94640 AIRWAY INHALATION TREATMENT: CPT

## 2024-03-31 RX ADMIN — Medication 30 MG: at 11:21

## 2024-03-31 RX ADMIN — ENOXAPARIN SODIUM 30 MG: 100 INJECTION SUBCUTANEOUS at 22:33

## 2024-03-31 RX ADMIN — Medication 2 PUFF: at 09:13

## 2024-03-31 RX ADMIN — ACETAMINOPHEN 650 MG: 650 SOLUTION ORAL at 00:33

## 2024-03-31 RX ADMIN — LEVOTHYROXINE SODIUM 150 MCG: 0.07 TABLET ORAL at 06:15

## 2024-03-31 RX ADMIN — SODIUM CHLORIDE, PRESERVATIVE FREE 10 ML: 5 INJECTION INTRAVENOUS at 22:36

## 2024-03-31 RX ADMIN — HYDROXYZINE PAMOATE 25 MG: 25 CAPSULE ORAL at 22:34

## 2024-03-31 RX ADMIN — CETIRIZINE HYDROCHLORIDE 5 MG: 10 TABLET, FILM COATED ORAL at 09:34

## 2024-03-31 RX ADMIN — Medication 2 PUFF: at 19:28

## 2024-03-31 RX ADMIN — ENOXAPARIN SODIUM 30 MG: 100 INJECTION SUBCUTANEOUS at 09:34

## 2024-03-31 RX ADMIN — Medication 30 MG: at 22:33

## 2024-03-31 ASSESSMENT — PAIN DESCRIPTION - LOCATION: LOCATION: SHOULDER

## 2024-03-31 ASSESSMENT — PAIN SCALES - WONG BAKER: WONGBAKER_NUMERICALRESPONSE: NO HURT

## 2024-03-31 ASSESSMENT — PAIN - FUNCTIONAL ASSESSMENT: PAIN_FUNCTIONAL_ASSESSMENT: PREVENTS OR INTERFERES SOME ACTIVE ACTIVITIES AND ADLS

## 2024-03-31 ASSESSMENT — PAIN DESCRIPTION - DESCRIPTORS: DESCRIPTORS: ACHING

## 2024-03-31 ASSESSMENT — PAIN DESCRIPTION - ORIENTATION: ORIENTATION: RIGHT

## 2024-03-31 ASSESSMENT — PAIN SCALES - GENERAL: PAINLEVEL_OUTOF10: 3

## 2024-04-01 VITALS
BODY MASS INDEX: 43.96 KG/M2 | WEIGHT: 248.1 LBS | RESPIRATION RATE: 18 BRPM | HEART RATE: 66 BPM | DIASTOLIC BLOOD PRESSURE: 57 MMHG | SYSTOLIC BLOOD PRESSURE: 110 MMHG | TEMPERATURE: 97.7 F | HEIGHT: 63 IN | OXYGEN SATURATION: 98 %

## 2024-04-01 PROCEDURE — 6370000000 HC RX 637 (ALT 250 FOR IP): Performed by: FAMILY MEDICINE

## 2024-04-01 PROCEDURE — 2580000003 HC RX 258: Performed by: FAMILY MEDICINE

## 2024-04-01 PROCEDURE — 6360000002 HC RX W HCPCS: Performed by: FAMILY MEDICINE

## 2024-04-01 PROCEDURE — 94761 N-INVAS EAR/PLS OXIMETRY MLT: CPT

## 2024-04-01 PROCEDURE — 94640 AIRWAY INHALATION TREATMENT: CPT

## 2024-04-01 PROCEDURE — 6370000000 HC RX 637 (ALT 250 FOR IP): Performed by: INTERNAL MEDICINE

## 2024-04-01 RX ADMIN — CETIRIZINE HYDROCHLORIDE 5 MG: 10 TABLET, FILM COATED ORAL at 10:01

## 2024-04-01 RX ADMIN — Medication 2 PUFF: at 07:45

## 2024-04-01 RX ADMIN — ENOXAPARIN SODIUM 30 MG: 100 INJECTION SUBCUTANEOUS at 10:02

## 2024-04-01 RX ADMIN — Medication 30 MG: at 10:05

## 2024-04-01 RX ADMIN — LOSARTAN POTASSIUM 50 MG: 50 TABLET, FILM COATED ORAL at 10:01

## 2024-04-01 RX ADMIN — SODIUM CHLORIDE, PRESERVATIVE FREE 10 ML: 5 INJECTION INTRAVENOUS at 10:02

## 2024-04-01 RX ADMIN — LEVOTHYROXINE SODIUM 150 MCG: 0.07 TABLET ORAL at 10:01

## 2024-04-01 NOTE — CARE COORDINATION
Patient appeal approved for IRF at [facility], bed available at 3pm/1500. Informed patient and daughter.    Checking to see if [facility]  can transport.    Calling [facility]/[employer] to check on trach order info.    1430: spoke with [employer]. They informed me that trachs are being delivered tomorrow. Informed patient and daughter. Also provided number to call for info on supplies; [phone] option 4

## 2024-04-01 NOTE — CARE COORDINATION
Patient discharging to [facility] today, [facility] picking up at 3pm/1500. Patient and family aware.    Nurse to call report at [phone]    Transition of Care Plan:    RUR: 9%  Prior Level of Functioning: ind  Disposition: IRF  If SNF or IPR: Date FOC offered: 3/21  Date FOC received: 3/21  Accepting facility: [facility]  Date authorization started with reference number: [id number]  Date authorization received and expires:   Follow up appointments:   DME needed:   Transportation at discharge: [facility]   [facility] Medicare/ letter given: na  Is patient a [place] and connected with VA? na  If yes, was  transfer form completed and VA notified? na  Caregiver Contact: at bedside  Discharge Caregiver contacted prior to discharge? At bedside  Care Conference needed? na  Barriers to discharge: na

## 2024-04-01 NOTE — DISCHARGE SUMMARY
Discharge Summary       PATIENT ID: April Mayo  MRN: [id number]   YOB: 1955    DATE OF ADMISSION: 3/13/2024   DATE OF DISCHARGE:   PRIMARY CARE PROVIDER: [unfilled]      ATTENDING PHYSICIAN: Alessandra Carpio  DISCHARGING PROVIDER: Alessandra Carpio      CONSULTATIONS: IP CONSULT TO PULMONOLOGY  IP CONSULT TO UROLOGY  IP CONSULT TO ONCOLOGY  IP CONSULT TO RADIATION ONCOLOGY    PROCEDURES/SURGERIES: Procedure(s):  AWAKE URGENT TRACHEOSTOMY AND BIOPSY (LOCAL WITH ANES STANDBY)    ADMITTING DIAGNOSES:    Patient Active Problem List    Diagnosis Date Noted    Suspected deep vein thrombosis (DVT) 03/22/2024    Tracheostomy care (HCC) 03/20/2024    [facility]aryngeal [facility] ([facility]) 03/20/2024    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 03/14/2024    Vocal cord mass 03/13/2024    Dysphonia 03/13/2024    Postablative hypothyroidism 12/22/2020    Non morbid obesity 12/22/2020       DISCHARGE DIAGNOSES / PLAN:      Invasive squamous cell carcinoma, keratinizing R vocal cord   Dysphonia  S/p tracheostomy   Hypertension  COPD  Sleep apnea  Ablation-induced hypothyroidism  Subcutaneous emphysema, resolved        DISCHARGE MEDICATIONS:     Medication List        START taking these medications      benzonatate 200 MG capsule  Commonly known as: TESSALON  Take 1 capsule by mouth 3 times daily as needed for Cough     budesonide-formoterol 160-4.5 MCG/ACT Aero  Commonly known as: SYMBICORT  Inhale 2 puffs into the lungs in the morning and 2 puffs in the evening.     dextromethorphan 30 MG/5ML extended release liquid  Commonly known as: DELSYM  Take 5 mLs by mouth in the morning and 5 mLs in the evening. Do all this for 10 days.     losartan 50 MG tablet  Commonly known as: COZAAR  Take 1 tablet by mouth daily            CHANGE how you take these medications      levothyroxine 150 MCG tablet  Commonly known as: SYNTHROID  Take 1 tablet by mouth Daily  What changed:   how much to take  how to take this  when to take 
   Discharge Summary       PATIENT ID: April Mayo  MRN: [id number]   YOB: 1955    DATE OF ADMISSION: 3/13/2024   DATE OF DISCHARGE:   PRIMARY CARE PROVIDER: [unfilled]      ATTENDING PHYSICIAN: Alessandra Carpio  DISCHARGING PROVIDER: Alessandra Carpio      CONSULTATIONS: IP CONSULT TO PULMONOLOGY  IP CONSULT TO UROLOGY  IP CONSULT TO ONCOLOGY  IP CONSULT TO RADIATION ONCOLOGY    PROCEDURES/SURGERIES: Procedure(s):  AWAKE URGENT TRACHEOSTOMY AND BIOPSY (LOCAL WITH ANES STANDBY)    ADMITTING DIAGNOSES:    Patient Active Problem List    Diagnosis Date Noted    Tracheostomy care (HCC) 03/20/2024    Laryngeal cancer ([facility]) 03/20/2024    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 03/14/2024    Vocal cord mass 03/13/2024    Dysphonia 03/13/2024    Postablative hypothyroidism 12/22/2020    Non morbid obesity 12/22/2020       DISCHARGE DIAGNOSES / PLAN:      Invasive squamous cell carcinoma, keratinizing R vocal cord   Dysphonia  S/p tracheostomy   Hypertension  COPD  Sleep apnea  Ablation-induced hypothyroidism  Subcutaneous emphysema, resolved        DISCHARGE MEDICATIONS:     Medication List        START taking these medications      Benzocaine-Menthol 6-10 MG Lozg lozenge  Commonly known as: CEPACOL  Take 1 lozenge by mouth every 2 hours as needed for Sore Throat     * benzonatate 100 MG capsule  Commonly known as: TESSALON  Take 1 capsule by mouth 3 times daily as needed for Cough     budesonide-formoterol 160-4.5 MCG/ACT Aero  Commonly known as: SYMBICORT  Inhale 2 puffs into the lungs in the morning and 2 puffs in the evening.     hydrOXYzine pamoate 25 MG capsule  Commonly known as: VISTARIL  Take 1 capsule by mouth at bedtime for 14 days     loperamide 2 MG capsule  Commonly known as: IMODIUM  Take 1 capsule by mouth 4 times daily as needed for Diarrhea     losartan 50 MG tablet  Commonly known as: COZAAR  Take 1 tablet by mouth daily           * This list has 1 medication(s) that are the same as 
   Discharge Summary       PATIENT ID: April Mayo  MRN: [id number]   YOB: 1955    DATE OF ADMISSION: 3/13/2024   DATE OF DISCHARGE:   PRIMARY CARE PROVIDER: [unfilled]      ATTENDING PHYSICIAN: Alessandra Carpio  DISCHARGING PROVIDER: Alessandra Carpio      CONSULTATIONS: IP CONSULT TO PULMONOLOGY  IP CONSULT TO UROLOGY  IP CONSULT TO ONCOLOGY  IP CONSULT TO RADIATION ONCOLOGY    PROCEDURES/SURGERIES: Procedure(s):  AWAKE URGENT TRACHEOSTOMY AND BIOPSY (LOCAL WITH ANES STANDBY)    ADMITTING DIAGNOSES:    Patient Active Problem List    Diagnosis Date Noted    Tracheostomy care (HCC) 03/20/2024    Laryngeal cancer ([facility]) 03/20/2024    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 03/14/2024    Vocal cord mass 03/13/2024    Dysphonia 03/13/2024    Postablative hypothyroidism 12/22/2020    Non morbid obesity 12/22/2020       DISCHARGE DIAGNOSES / PLAN:      Invasive squamous cell carcinoma, keratinizing R vocal cord   Dysphonia  S/p tracheostomy   Hypertension  COPD  Sleep apnea  Ablation-induced hypothyroidism  Subcutaneous emphysema, resolved        DISCHARGE MEDICATIONS:     Medication List        START taking these medications      Benzocaine-Menthol 6-10 MG Lozg lozenge  Commonly known as: CEPACOL  Take 1 lozenge by mouth every 2 hours as needed for Sore Throat     * benzonatate 200 MG capsule  Commonly known as: TESSALON  Take 1 capsule by mouth 3 times daily as needed for Cough     * benzonatate 100 MG capsule  Commonly known as: TESSALON  Take 1 capsule by mouth 3 times daily as needed for Cough     budesonide-formoterol 160-4.5 MCG/ACT Aero  Commonly known as: SYMBICORT  Inhale 2 puffs into the lungs in the morning and 2 puffs in the evening.     dextromethorphan 30 MG/5ML extended release liquid  Commonly known as: DELSYM  Take 5 mLs by mouth in the morning and 5 mLs in the evening. Do all this for 10 days.     hydrOXYzine pamoate 25 MG capsule  Commonly known as: VISTARIL  Take 1 capsule by mouth 
   Discharge Summary       PATIENT ID: April Mayo  MRN: [id number]   YOB: 1955    DATE OF ADMISSION: 3/13/2024   DATE OF DISCHARGE:   PRIMARY CARE PROVIDER: [unfilled]      ATTENDING PHYSICIAN: Alessandra Carpio  DISCHARGING PROVIDER: Alessandra Carpio      CONSULTATIONS: IP CONSULT TO PULMONOLOGY  IP CONSULT TO UROLOGY  IP CONSULT TO ONCOLOGY  IP CONSULT TO RADIATION ONCOLOGY    PROCEDURES/SURGERIES: Procedure(s):  AWAKE URGENT TRACHEOSTOMY AND BIOPSY (LOCAL WITH ANES STANDBY)    ADMITTING DIAGNOSES:    Patient Active Problem List    Diagnosis Date Noted    Tracheostomy care (HCC) 03/20/2024    Laryngeal cancer ([facility]) 03/20/2024    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 03/14/2024    Vocal cord mass 03/13/2024    Dysphonia 03/13/2024    Postablative hypothyroidism 12/22/2020    Non morbid obesity 12/22/2020       DISCHARGE DIAGNOSES / PLAN:      Invasive squamous cell carcinoma, keratinizing R vocal cord   Dysphonia  S/p tracheostomy   Hypertension  COPD  Sleep apnea  Ablation-induced hypothyroidism  Subcutaneous emphysema, resolved        DISCHARGE MEDICATIONS:     Medication List        START taking these medications      Benzocaine-Menthol 6-10 MG Lozg lozenge  Commonly known as: CEPACOL  Take 1 lozenge by mouth every 2 hours as needed for Sore Throat     * benzonatate 200 MG capsule  Commonly known as: TESSALON  Take 1 capsule by mouth 3 times daily as needed for Cough     * benzonatate 100 MG capsule  Commonly known as: TESSALON  Take 1 capsule by mouth 3 times daily as needed for Cough     budesonide-formoterol 160-4.5 MCG/ACT Aero  Commonly known as: SYMBICORT  Inhale 2 puffs into the lungs in the morning and 2 puffs in the evening.     dextromethorphan 30 MG/5ML extended release liquid  Commonly known as: DELSYM  Take 5 mLs by mouth in the morning and 5 mLs in the evening. Do all this for 10 days.     hydrOXYzine pamoate 25 MG capsule  Commonly known as: VISTARIL  Take 1 capsule by mouth 
   Discharge Summary       PATIENT ID: April Mayo  MRN: [id number]   YOB: 1955    DATE OF ADMISSION: 3/13/2024   DATE OF DISCHARGE:   PRIMARY CARE PROVIDER: [unfilled]      ATTENDING PHYSICIAN: Alessandra Carpio  DISCHARGING PROVIDER: Alessandra Carpio      CONSULTATIONS: IP CONSULT TO PULMONOLOGY  IP CONSULT TO UROLOGY  IP CONSULT TO ONCOLOGY  IP CONSULT TO RADIATION ONCOLOGY    PROCEDURES/SURGERIES: Procedure(s):  AWAKE URGENT TRACHEOSTOMY AND BIOPSY (LOCAL WITH ANES STANDBY)    ADMITTING DIAGNOSES:    Patient Active Problem List    Diagnosis Date Noted    Suspected deep vein thrombosis (DVT) 03/22/2024    Tracheostomy care (HCC) 03/20/2024    Laryngeal cancer ([facility]) 03/20/2024    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 03/14/2024    Vocal cord mass 03/13/2024    Dysphonia 03/13/2024    Postablative hypothyroidism 12/22/2020    Non morbid obesity 12/22/2020       DISCHARGE DIAGNOSES / PLAN:      Invasive squamous cell carcinoma, keratinizing R vocal cord   Dysphonia  S/p tracheostomy   Hypertension  COPD  Sleep apnea  Ablation-induced hypothyroidism  Subcutaneous emphysema, resolved        DISCHARGE MEDICATIONS:     Medication List        START taking these medications      benzonatate 200 MG capsule  Commonly known as: TESSALON  Take 1 capsule by mouth 3 times daily as needed for Cough     budesonide-formoterol 160-4.5 MCG/ACT Aero  Commonly known as: SYMBICORT  Inhale 2 puffs into the lungs in the morning and 2 puffs in the evening.     dextromethorphan 30 MG/5ML extended release liquid  Commonly known as: DELSYM  Take 5 mLs by mouth in the morning and 5 mLs in the evening. Do all this for 10 days.     losartan 50 MG tablet  Commonly known as: COZAAR  Take 1 tablet by mouth daily            CHANGE how you take these medications      levothyroxine 150 MCG tablet  Commonly known as: SYNTHROID  Take 1 tablet by mouth Daily  What changed:   how much to take  how to take this  when to take 
   Discharge Summary       PATIENT ID: April Mayo  MRN: [id number]   YOB: 1955    DATE OF ADMISSION: 3/13/2024   DATE OF DISCHARGE:   PRIMARY CARE PROVIDER: [unfilled]      ATTENDING PHYSICIAN: Alessandra Carpio  DISCHARGING PROVIDER: Alessandra Carpio      CONSULTATIONS: IP CONSULT TO PULMONOLOGY  IP CONSULT TO UROLOGY  IP CONSULT TO ONCOLOGY  IP CONSULT TO RADIATION ONCOLOGY    PROCEDURES/SURGERIES: Procedure(s):  AWAKE URGENT TRACHEOSTOMY AND BIOPSY (LOCAL WITH ANES STANDBY)    ADMITTING DIAGNOSES:    Patient Active Problem List    Diagnosis Date Noted    Suspected deep vein thrombosis (DVT) 03/22/2024    Tracheostomy care (HCC) 03/20/2024    Laryngeal cancer ([facility]) 03/20/2024    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 03/14/2024    Vocal cord mass 03/13/2024    Dysphonia 03/13/2024    Postablative hypothyroidism 12/22/2020    Non morbid obesity 12/22/2020       DISCHARGE DIAGNOSES / PLAN:      Invasive squamous cell carcinoma, keratinizing R vocal cord   Dysphonia  S/p tracheostomy   Hypertension  COPD  Sleep apnea  Ablation-induced hypothyroidism  Subcutaneous emphysema, resolved        DISCHARGE MEDICATIONS:     Medication List        START taking these medications      benzonatate 200 MG capsule  Commonly known as: TESSALON  Take 1 capsule by mouth 3 times daily as needed for Cough     budesonide-formoterol 160-4.5 MCG/ACT Aero  Commonly known as: SYMBICORT  Inhale 2 puffs into the lungs in the morning and 2 puffs in the evening.     dextromethorphan 30 MG/5ML extended release liquid  Commonly known as: DELSYM  Take 5 mLs by mouth in the morning and 5 mLs in the evening. Do all this for 10 days.     losartan 50 MG tablet  Commonly known as: COZAAR  Take 1 tablet by mouth daily            CHANGE how you take these medications      levothyroxine 150 MCG tablet  Commonly known as: SYNTHROID  Take 1 tablet by mouth Daily  What changed:   how much to take  how to take this  when to take 
   Discharge Summary       PATIENT ID: April Mayo  MRN: [id number]   YOB: 1955    DATE OF ADMISSION: 3/13/2024   DATE OF DISCHARGE:   PRIMARY CARE PROVIDER: [unfilled]      ATTENDING PHYSICIAN: Alessandra Carpio  DISCHARGING PROVIDER: Alessandra Carpio      CONSULTATIONS: IP CONSULT TO PULMONOLOGY  IP CONSULT TO UROLOGY  IP CONSULT TO ONCOLOGY  IP CONSULT TO RADIATION ONCOLOGY    PROCEDURES/SURGERIES: Procedure(s):  AWAKE URGENT TRACHEOSTOMY AND BIOPSY (LOCAL WITH ANES STANDBY)    ADMITTING DIAGNOSES:    Patient Active Problem List    Diagnosis Date Noted    Tracheostomy care ([facility]) 03/20/2024    Laryngeal cancer ([facility]) 03/20/2024    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 03/14/2024    Laryngeal mass 03/13/2024    Dysphonia 03/13/2024    Postablative hypothyroidism 12/22/2020    Non morbid obesity 12/22/2020       DISCHARGE DIAGNOSES / PLAN:      Invasive squamous cell carcinoma, keratinizing R vocal cord   Dysphonia  S/p tracheostomy   Hypertension  COPD  Sleep apnea  Ablation-induced hypothyroidism  Subcutaneous emphysema, resolved        DISCHARGE MEDICATIONS:     Medication List        START taking these medications      benzonatate 200 MG capsule  Commonly known as: TESSALON  Take 1 capsule by mouth 3 times daily as needed for Cough     budesonide-formoterol 160-4.5 MCG/ACT Aero  Commonly known as: SYMBICORT  Inhale 2 puffs into the lungs in the morning and 2 puffs in the evening.     dextromethorphan 30 MG/5ML extended release liquid  Commonly known as: DELSYM  Take 5 mLs by mouth in the morning and 5 mLs in the evening. Do all this for 10 days.     losartan 50 MG tablet  Commonly known as: COZAAR  Take 1 tablet by mouth daily  Start taking on: March 21, 2024            CHANGE how you take these medications      levothyroxine 150 MCG tablet  Commonly known as: SYNTHROID  Take 1 tablet by mouth Daily  Start taking on: March 21, 2024  What changed:   how much to take  how to take this  when to 
   Discharge Summary       PATIENT ID: April Mayo  MRN: [id number]   YOB: 1955    DATE OF ADMISSION: 3/13/2024   DATE OF DISCHARGE:   PRIMARY CARE PROVIDER: [unfilled]      ATTENDING PHYSICIAN: Alessandra Carpio  DISCHARGING PROVIDER: Alessandra Carpio      CONSULTATIONS: IP CONSULT TO PULMONOLOGY  IP CONSULT TO UROLOGY  IP CONSULT TO ONCOLOGY  IP CONSULT TO RADIATION ONCOLOGY    PROCEDURES/SURGERIES: Procedure(s):  AWAKE URGENT TRACHEOSTOMY AND BIOPSY (LOCAL WITH ANES STANDBY)    ADMITTING DIAGNOSES:    Patient Active Problem List    Diagnosis Date Noted    Tracheostomy care (HCC) 03/20/2024    Laryngeal cancer ([facility]) 03/20/2024    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 03/14/2024    Vocal cord mass 03/13/2024    Dysphonia 03/13/2024    Postablative hypothyroidism 12/22/2020    Non morbid obesity 12/22/2020       DISCHARGE DIAGNOSES / PLAN:      Invasive squamous cell carcinoma, keratinizing R vocal cord   Dysphonia  S/p tracheostomy   Hypertension  COPD  Sleep apnea  Ablation-induced hypothyroidism  Subcutaneous emphysema, resolved        DISCHARGE MEDICATIONS:     Medication List        START taking these medications      Benzocaine-Menthol 6-10 MG Lozg lozenge  Commonly known as: CEPACOL  Take 1 lozenge by mouth every 2 hours as needed for Sore Throat     * benzonatate 100 MG capsule  Commonly known as: TESSALON  Take 1 capsule by mouth 3 times daily as needed for Cough     budesonide-formoterol 160-4.5 MCG/ACT Aero  Commonly known as: SYMBICORT  Inhale 2 puffs into the lungs in the morning and 2 puffs in the evening.     dextromethorphan 30 MG/5ML extended release liquid  Commonly known as: DELSYM  Take 5 mLs by mouth in the morning and 5 mLs in the evening. Do all this for 10 days.     hydrOXYzine pamoate 25 MG capsule  Commonly known as: VISTARIL  Take 1 capsule by mouth at bedtime for 14 days     loperamide 2 MG capsule  Commonly known as: IMODIUM  Take 1 capsule by mouth 4 times daily as 
   Discharge Summary       PATIENT ID: April Mayo  MRN: [id number]   YOB: 1955    DATE OF ADMISSION: 3/13/2024   DATE OF DISCHARGE:   PRIMARY CARE PROVIDER: [unfilled]      ATTENDING PHYSICIAN: Alessandra Carpio  DISCHARGING PROVIDER: Alessandra Carpio      CONSULTATIONS: IP CONSULT TO PULMONOLOGY  IP CONSULT TO UROLOGY  IP CONSULT TO ONCOLOGY  IP CONSULT TO RADIATION ONCOLOGY    PROCEDURES/SURGERIES: Procedure(s):  AWAKE URGENT TRACHEOSTOMY AND BIOPSY (LOCAL WITH ANES STANDBY)    ADMITTING DIAGNOSES:    Patient Active Problem List    Diagnosis Date Noted    Tracheostomy care ([facility]) 03/20/2024    Laryngeal cancer ([facility]) 03/20/2024    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 03/14/2024    Laryngeal mass 03/13/2024    Dysphonia 03/13/2024    Postablative hypothyroidism 12/22/2020    Non morbid obesity 12/22/2020       DISCHARGE DIAGNOSES / PLAN:      Invasive squamous cell carcinoma, keratinizing R vocal cord   Dysphonia  S/p tracheostomy   Hypertension  COPD  Sleep apnea  Ablation-induced hypothyroidism  Subcutaneous emphysema, resolved        DISCHARGE MEDICATIONS:     Medication List        ASK your doctor about these medications      acetaminophen 325 MG tablet  Commonly known as: TYLENOL     Budeson-Glycopyrrol-Formoterol 160-9-4.8 MCG/ACT Aero     CALCIUM PO     levocetirizine 5 MG tablet  Commonly known as: XYZAL     telmisartan-hydroCHLOROthiazide 40-12.5 MG per tablet  Commonly known as: MICARDIS HCT     Unithroid 150 MCG tablet  Generic drug: levothyroxine  Take 1 tab Mon-Sat only. NONE on Sundays. Stop 175 mcg                  NOTIFY YOUR PHYSICIAN FOR ANY OF THE FOLLOWING:   Fever over 101 degrees for 24 hours.   Chest pain, shortness of breath, fever, chills, nausea, vomiting, diarrhea, change in mentation, falling, weakness, bleeding. Severe pain or pain not relieved by medications.  Or, any other signs or symptoms that you may have questions about.    DISPOSITION:  x  Home With:   OT  PT 
   Discharge Summary       PATIENT ID: April Mayo  MRN: [id number]   YOB: 1955    DATE OF ADMISSION: 3/13/2024   DATE OF DISCHARGE:   PRIMARY CARE PROVIDER: [unfilled]      ATTENDING PHYSICIAN: Alessandra Carpio  DISCHARGING PROVIDER: Alessandra Carpio      CONSULTATIONS: IP CONSULT TO PULMONOLOGY  IP CONSULT TO UROLOGY  IP CONSULT TO ONCOLOGY  IP CONSULT TO RADIATION ONCOLOGY    PROCEDURES/SURGERIES: Procedure(s):  AWAKE URGENT TRACHEOSTOMY AND BIOPSY (LOCAL WITH ANES STANDBY)    ADMITTING DIAGNOSES:    Patient Active Problem List    Diagnosis Date Noted    Tracheostomy care (HCC) 03/20/2024    Laryngeal cancer ([facility]) 03/20/2024    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 03/14/2024    Vocal cord mass 03/13/2024    Dysphonia 03/13/2024    Postablative hypothyroidism 12/22/2020    Non morbid obesity 12/22/2020       DISCHARGE DIAGNOSES / PLAN:      Invasive squamous cell carcinoma, keratinizing R vocal cord   Dysphonia  S/p tracheostomy   Hypertension  COPD  Sleep apnea  Ablation-induced hypothyroidism  Subcutaneous emphysema, resolved        DISCHARGE MEDICATIONS:     Medication List        START taking these medications      Benzocaine-Menthol 6-10 MG Lozg lozenge  Commonly known as: CEPACOL  Take 1 lozenge by mouth every 2 hours as needed for Sore Throat     * benzonatate 200 MG capsule  Commonly known as: TESSALON  Take 1 capsule by mouth 3 times daily as needed for Cough     * benzonatate 100 MG capsule  Commonly known as: TESSALON  Take 1 capsule by mouth 3 times daily as needed for Cough     budesonide-formoterol 160-4.5 MCG/ACT Aero  Commonly known as: SYMBICORT  Inhale 2 puffs into the lungs in the morning and 2 puffs in the evening.     dextromethorphan 30 MG/5ML extended release liquid  Commonly known as: DELSYM  Take 5 mLs by mouth in the morning and 5 mLs in the evening. Do all this for 10 days.     hydrOXYzine pamoate 25 MG capsule  Commonly known as: VISTARIL  Take 1 capsule by mouth 
   Discharge Summary       PATIENT ID: April Mayo  MRN: [id number]   YOB: 1955    DATE OF ADMISSION: 3/13/2024   DATE OF DISCHARGE:   PRIMARY CARE PROVIDER: [unfilled]      ATTENDING PHYSICIAN: Alessandra Carpio  DISCHARGING PROVIDER: Alessandra Carpio      CONSULTATIONS: IP CONSULT TO PULMONOLOGY  IP CONSULT TO UROLOGY  IP CONSULT TO ONCOLOGY  IP CONSULT TO RADIATION ONCOLOGY    PROCEDURES/SURGERIES: Procedure(s):  AWAKE URGENT TRACHEOSTOMY AND BIOPSY (LOCAL WITH ANES STANDBY)    ADMITTING DIAGNOSES:    Patient Active Problem List    Diagnosis Date Noted    Suspected deep vein thrombosis (DVT) 03/22/2024    Tracheostomy care (HCC) 03/20/2024    Laryngeal cancer ([facility]) 03/20/2024    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 03/14/2024    Vocal cord mass 03/13/2024    Dysphonia 03/13/2024    Postablative hypothyroidism 12/22/2020    Non morbid obesity 12/22/2020       DISCHARGE DIAGNOSES / PLAN:      Invasive squamous cell carcinoma, keratinizing R vocal cord   Dysphonia  S/p tracheostomy   Hypertension  COPD  Sleep apnea  Ablation-induced hypothyroidism  Subcutaneous emphysema, resolved        DISCHARGE MEDICATIONS:     Medication List        START taking these medications      benzonatate 200 MG capsule  Commonly known as: TESSALON  Take 1 capsule by mouth 3 times daily as needed for Cough     budesonide-formoterol 160-4.5 MCG/ACT Aero  Commonly known as: SYMBICORT  Inhale 2 puffs into the lungs in the morning and 2 puffs in the evening.     dextromethorphan 30 MG/5ML extended release liquid  Commonly known as: DELSYM  Take 5 mLs by mouth in the morning and 5 mLs in the evening. Do all this for 10 days.     losartan 50 MG tablet  Commonly known as: COZAAR  Take 1 tablet by mouth daily            CHANGE how you take these medications      levothyroxine 150 MCG tablet  Commonly known as: SYNTHROID  Take 1 tablet by mouth Daily  What changed:   how much to take  how to take this  when to take 
   Discharge Summary       PATIENT ID: April Mayo  MRN: [id number]   YOB: 1955    DATE OF ADMISSION: 3/13/2024   DATE OF DISCHARGE:   PRIMARY CARE PROVIDER: [unfilled]      ATTENDING PHYSICIAN: Alessandra Carpio  DISCHARGING PROVIDER: Alessandra Carpio      CONSULTATIONS: IP CONSULT TO PULMONOLOGY  IP CONSULT TO UROLOGY  IP CONSULT TO ONCOLOGY  IP CONSULT TO RADIATION ONCOLOGY    PROCEDURES/SURGERIES: Procedure(s):  AWAKE URGENT TRACHEOSTOMY AND BIOPSY (LOCAL WITH ANES STANDBY)    ADMITTING DIAGNOSES:    Patient Active Problem List    Diagnosis Date Noted    Suspected deep vein thrombosis (DVT) 03/22/2024    Tracheostomy care (HCC) 03/20/2024    Laryngeal cancer ([facility]) 03/20/2024    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 03/14/2024    Vocal cord mass 03/13/2024    Dysphonia 03/13/2024    Postablative hypothyroidism 12/22/2020    Non morbid obesity 12/22/2020       DISCHARGE DIAGNOSES / PLAN:      Invasive squamous cell carcinoma, keratinizing R vocal cord   Dysphonia  S/p tracheostomy   Hypertension  COPD  Sleep apnea  Ablation-induced hypothyroidism  Subcutaneous emphysema, resolved        DISCHARGE MEDICATIONS:     Medication List        START taking these medications      benzonatate 200 MG capsule  Commonly known as: TESSALON  Take 1 capsule by mouth 3 times daily as needed for Cough     budesonide-formoterol 160-4.5 MCG/ACT Aero  Commonly known as: SYMBICORT  Inhale 2 puffs into the lungs in the morning and 2 puffs in the evening.     dextromethorphan 30 MG/5ML extended release liquid  Commonly known as: DELSYM  Take 5 mLs by mouth in the morning and 5 mLs in the evening. Do all this for 10 days.     losartan 50 MG tablet  Commonly known as: COZAAR  Take 1 tablet by mouth daily            CHANGE how you take these medications      levothyroxine 150 MCG tablet  Commonly known as: SYNTHROID  Take 1 tablet by mouth Daily  What changed:   how much to take  how to take this  when to take 
   Discharge Summary       PATIENT ID: April Mayo  MRN: [id number]   YOB: 1955    DATE OF ADMISSION: 3/13/2024   DATE OF DISCHARGE:   PRIMARY CARE PROVIDER: [unfilled]      ATTENDING PHYSICIAN: Alessandra Carpio  DISCHARGING PROVIDER: Alessandra Carpio      CONSULTATIONS: IP CONSULT TO PULMONOLOGY  IP CONSULT TO UROLOGY  IP CONSULT TO ONCOLOGY  IP CONSULT TO RADIATION ONCOLOGY    PROCEDURES/SURGERIES: Procedure(s):  AWAKE URGENT TRACHEOSTOMY AND BIOPSY (LOCAL WITH ANES STANDBY)    ADMITTING DIAGNOSES:    Patient Active Problem List    Diagnosis Date Noted    Suspected deep vein thrombosis (DVT) 03/22/2024    Tracheostomy care (HCC) 03/20/2024    Laryngeal cancer ([facility]) 03/20/2024    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 03/14/2024    Vocal cord mass 03/13/2024    Dysphonia 03/13/2024    Postablative hypothyroidism 12/22/2020    Non morbid obesity 12/22/2020       DISCHARGE DIAGNOSES / PLAN:      Invasive squamous cell carcinoma, keratinizing R vocal cord   Dysphonia  S/p tracheostomy   Hypertension  COPD  Sleep apnea  Ablation-induced hypothyroidism  Subcutaneous emphysema, resolved        DISCHARGE MEDICATIONS:     Medication List        START taking these medications      benzonatate 200 MG capsule  Commonly known as: TESSALON  Take 1 capsule by mouth 3 times daily as needed for Cough     budesonide-formoterol 160-4.5 MCG/ACT Aero  Commonly known as: SYMBICORT  Inhale 2 puffs into the lungs in the morning and 2 puffs in the evening.     dextromethorphan 30 MG/5ML extended release liquid  Commonly known as: DELSYM  Take 5 mLs by mouth in the morning and 5 mLs in the evening. Do all this for 10 days.     losartan 50 MG tablet  Commonly known as: COZAAR  Take 1 tablet by mouth daily            CHANGE how you take these medications      levothyroxine 150 MCG tablet  Commonly known as: SYNTHROID  Take 1 tablet by mouth Daily  What changed:   how much to take  how to take this  when to take 
   Discharge Summary       PATIENT ID: April Mayo  MRN: [id number]   YOB: 1955    DATE OF ADMISSION: 3/13/2024   DATE OF DISCHARGE:   PRIMARY CARE PROVIDER: [unfilled]      ATTENDING PHYSICIAN: Alessandra Carpio  DISCHARGING PROVIDER: Alessandra Carpio      CONSULTATIONS: IP CONSULT TO PULMONOLOGY  IP CONSULT TO UROLOGY  IP CONSULT TO ONCOLOGY  IP CONSULT TO RADIATION ONCOLOGY    PROCEDURES/SURGERIES: Procedure(s):  AWAKE URGENT TRACHEOSTOMY AND BIOPSY (LOCAL WITH ANES STANDBY)    ADMITTING DIAGNOSES:    Patient Active Problem List    Diagnosis Date Noted    Tracheostomy care ([facility]) 03/20/2024    Laryngeal cancer ([facility]) 03/20/2024    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 03/14/2024    Laryngeal mass 03/13/2024    Dysphonia 03/13/2024    Postablative hypothyroidism 12/22/2020    Non morbid obesity 12/22/2020       DISCHARGE DIAGNOSES / PLAN:      Invasive squamous cell carcinoma, keratinizing R vocal cord   Dysphonia  S/p tracheostomy   Hypertension  COPD  Sleep apnea  Ablation-induced hypothyroidism  Subcutaneous emphysema, resolved        DISCHARGE MEDICATIONS:     Medication List        START taking these medications      benzonatate 200 MG capsule  Commonly known as: TESSALON  Take 1 capsule by mouth 3 times daily as needed for Cough     budesonide-formoterol 160-4.5 MCG/ACT Aero  Commonly known as: SYMBICORT  Inhale 2 puffs into the lungs in the morning and 2 puffs in the evening.     dextromethorphan 30 MG/5ML extended release liquid  Commonly known as: DELSYM  Take 5 mLs by mouth in the morning and 5 mLs in the evening. Do all this for 10 days.     losartan 50 MG tablet  Commonly known as: COZAAR  Take 1 tablet by mouth daily            CHANGE how you take these medications      levothyroxine 150 MCG tablet  Commonly known as: SYNTHROID  Take 1 tablet by mouth Daily  What changed:   how much to take  how to take this  when to take this  additional instructions            CONTINUE taking 
   Discharge Summary       PATIENT ID: April Mayo  MRN: [id number]   YOB: 1955    DATE OF ADMISSION: 3/13/2024   DATE OF DISCHARGE:   PRIMARY CARE PROVIDER: [unfilled]      ATTENDING PHYSICIAN: Alessandra Carpio  DISCHARGING PROVIDER: Alessandra Carpio      CONSULTATIONS: IP CONSULT TO PULMONOLOGY  IP CONSULT TO UROLOGY  IP CONSULT TO ONCOLOGY  IP CONSULT TO RADIATION ONCOLOGY    PROCEDURES/SURGERIES: Procedure(s):  AWAKE URGENT TRACHEOSTOMY AND BIOPSY (LOCAL WITH ANES STANDBY)    ADMITTING DIAGNOSES:    Patient Active Problem List    Diagnosis Date Noted    Suspected deep vein thrombosis (DVT) 03/22/2024    Tracheostomy care (HCC) 03/20/2024    Laryngeal cancer ([facility]) 03/20/2024    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 03/14/2024    Vocal cord mass 03/13/2024    Dysphonia 03/13/2024    Postablative hypothyroidism 12/22/2020    Non morbid obesity 12/22/2020       DISCHARGE DIAGNOSES / PLAN:      Invasive squamous cell carcinoma, keratinizing R vocal cord   Dysphonia  S/p tracheostomy   Hypertension  COPD  Sleep apnea  Ablation-induced hypothyroidism  Subcutaneous emphysema, resolved        DISCHARGE MEDICATIONS:     Medication List        START taking these medications      benzonatate 200 MG capsule  Commonly known as: TESSALON  Take 1 capsule by mouth 3 times daily as needed for Cough     budesonide-formoterol 160-4.5 MCG/ACT Aero  Commonly known as: SYMBICORT  Inhale 2 puffs into the lungs in the morning and 2 puffs in the evening.     dextromethorphan 30 MG/5ML extended release liquid  Commonly known as: DELSYM  Take 5 mLs by mouth in the morning and 5 mLs in the evening. Do all this for 10 days.     losartan 50 MG tablet  Commonly known as: COZAAR  Take 1 tablet by mouth daily            CHANGE how you take these medications      levothyroxine 150 MCG tablet  Commonly known as: SYNTHROID  Take 1 tablet by mouth Daily  What changed:   how much to take  how to take this  when to take 
duplex   Right leg deep vein system and superficial vein system do not show acute venous thrombosis.  Right leg deep vein system shows spontaneous, phasic, competent venous flow with augmentation.     3/19  Patient to have trach changed to 6 uncuffed today.     No new complaints today. Denied chest pain.     Heme/Onc recommending outpatient PET scan for complete staging. Will be followed outpatient to decide on treatment plan.    3/20    Patient resting comfortably after yesterday's trach change. Denied any new pains or shortness of breath. Passy-[place] valve trial and MBS today./If normal patient can be discharged    Patient can be discharged pending MBS results.    Has been cleared for discharge by ENT and oncology, to follow up outpatient with oncology for PET scan to determine final staging and establish treatment plan.      Medication reconciliation done time discharge patient 35 minutes 50% time spent counseling and coordination of care     3/21    Patient complained of right scapular area pain. XR to be ordered. Barium swallow results show no problems with swallowing.  WBC 11.4    PT reassessed today and determined that inpatient rehab would be better fit vs SNF.    Signed:   Duncan Bass  3/21/2024  10:12 AM     
liquids and 1:1 assistance with strict aspiration precautions     3/15     Numerous consults yesterday. See above.     Today, patient resting comfortably not feeling any worse than yesterday. The patient indicated a small amount of chest pain that she seemed to associate with the tracheostomy. No shortness of breath, dizziness, nausea, diarrhea. Patient on high flow 20 L 21% FiO2 by trach tube.     Accompanied by daughters.     CXR  1. Persistent moderate to severe pneumomediastinum is unchanged. No pneumothorax.      Today, patient's WBC 13.1. Was 13.0 yesterday.  3/16  Patient is lying in bed family was by the bedside has a tracheostomy with respiratory vent has laryngeal mass hide biopsy results pending obtain PT and OT     3/17  Results are pending  Case management for placement     CXR: Decreased pneumomediastinum. No new finding..      3/18  ENT noted at least T3 laryngeal tumor  Pathology report of 3/17 shows invasive squamous cell carcinoma, keratinizing      Patient resting in bed without shortness of breath. Chest pain associated with trach site persists. Patient complained of right shoulder/scapular pain, daughter noted that patient has arthritis, may be contributory.     Venous duplex   Right leg deep vein system and superficial vein system do not show acute venous thrombosis.  Right leg deep vein system shows spontaneous, phasic, competent venous flow with augmentation.     3/19  Patient to have trach changed to 6 uncuffed today.     No new complaints today. Denied chest pain.     Heme/Onc recommending outpatient PET scan for complete staging. Will be followed outpatient to decide on treatment plan.    3/20    Patient resting comfortably after yesterday's trach change. Denied any new pains or shortness of breath. Passy-[place] valve trial and MBS today./If normal patient can be discharged    Patient can be discharged pending MBS results.    Has been cleared for discharge by ENT and oncology, to follow up 
liquids and 1:1 assistance with strict aspiration precautions     3/15     Numerous consults yesterday. See above.     Today, patient resting comfortably not feeling any worse than yesterday. The patient indicated a small amount of chest pain that she seemed to associate with the tracheostomy. No shortness of breath, dizziness, nausea, diarrhea. Patient on high flow 20 L 21% FiO2 by trach tube.     Accompanied by daughters.     CXR  1. Persistent moderate to severe pneumomediastinum is unchanged. No pneumothorax.      Today, patient's WBC 13.1. Was 13.0 yesterday.  3/16  Patient is lying in bed family was by the bedside has a tracheostomy with respiratory vent has laryngeal mass hide biopsy results pending obtain PT and OT     3/17  Results are pending  Case management for placement     CXR: Decreased pneumomediastinum. No new finding..      3/18  ENT noted at least T3 laryngeal tumor  Pathology report of 3/17 shows invasive squamous cell carcinoma, keratinizing      Patient resting in bed without shortness of breath. Chest pain associated with trach site persists. Patient complained of right shoulder/scapular pain, daughter noted that patient has arthritis, may be contributory.     Venous duplex   Right leg deep vein system and superficial vein system do not show acute venous thrombosis.  Right leg deep vein system shows spontaneous, phasic, competent venous flow with augmentation.     3/19  Patient to have trach changed to 6 uncuffed today.     No new complaints today. Denied chest pain.     Heme/Onc recommending outpatient PET scan for complete staging. Will be followed outpatient to decide on treatment plan.    3/20    Patient resting comfortably after yesterday's trach change. Denied any new pains or shortness of breath. Passy-[place] valve trial and MBS today./If normal patient can be discharged    Patient can be discharged pending MBS results.    Has been cleared for discharge by ENT and oncology, to follow up 
persists. Patient complained of right shoulder/scapular pain, daughter noted that patient has arthritis, may be contributory.     Venous duplex   Right leg deep vein system and superficial vein system do not show acute venous thrombosis.  Right leg deep vein system shows spontaneous, phasic, competent venous flow with augmentation.     3/19  Patient to have trach changed to 6 uncuffed today.     No new complaints today. Denied chest pain.     Heme/Onc recommending outpatient PET scan for complete staging. Will be followed outpatient to decide on treatment plan.    3/20    Patient resting comfortably after yesterday's trach change. Denied any new pains or shortness of breath. Passy-[place] valve trial and MBS today./If normal patient can be discharged    Patient can be discharged pending MBS results.    Has been cleared for discharge by ENT and oncology, to follow up outpatient with oncology for PET scan to determine final staging and establish treatment plan.      Medication reconciliation done time discharge patient 35 minutes 50% time spent counseling and coordination of care     3/21    Patient complained of right scapular area pain. XR to be ordered. Barium swallow results show no problems with swallowing.  WBC 11.4    PT reassessed today and determined that inpatient rehab would be better fit vs SNF.    Will do x-ray of the right shoulder    3/22    Hematology consulted w/ patient regarding squamous cell carcinoma pathology. Family and pt leaning towards rehab and currently awaiting placement. Hematology signed off on patient and wants to ensure she has follow up appointments scheduled with Dr. Mahajan ([facility]) and Dr. Weaver ([facility]).     PET scan to be scheduled for OP.     X-ray of right shoulder - no acute process     Patient is sitting up in chair. Today, she has experienced some dizziness when standing from a seated position. Denies headache, chest pain, dyspnea, nausea and vomiting, fever and 
discharge by ENT and oncology, to follow up outpatient with oncology for PET scan to determine final staging and establish treatment plan.      Medication reconciliation done time discharge patient 35 minutes 50% time spent counseling and coordination of care     3/21    Patient complained of right scapular area pain. XR to be ordered. Barium swallow results show no problems with swallowing.  WBC 11.4    PT reassessed today and determined that inpatient rehab would be better fit vs SNF.    Will do x-ray of the right shoulder    3/22    Hematology consulted w/ patient regarding squamous cell carcinoma pathology. Family and pt leaning towards rehab and currently awaiting placement. Hematology signed off on patient and wants to ensure she has follow up appointments scheduled with Dr. Mahajan ([facility]) and Dr. Weaver ([facility]).     PET scan to be scheduled for OP.     X-ray of right shoulder - no acute process     Patient is sitting up in chair. Today, she has experienced some dizziness when standing from a seated position. Denies headache, chest pain, dyspnea, nausea and vomiting, fever and chills.    3/23  Speech Therapy Consulted w/ patient     Patient sitting comfortably in bed. She still reports occasional dizziness when rising from a seated position, but other than that, no complaints. She is ready for discharge to SNF.     3/24  Waiting for prior auth approval for DC to [facility]   Patient is sitting in chair staying hydrated. States her 2 daughters will be visiting her today.     3/25  Pulmonology consulted - plan to continue current trach care for rehab placement.   Waiting on prior auth approval for DC to [facility].    Added Tessalon 100 mg 3 times a day for cough    Patient's daughter is in the room helping her ambulate to the bathroom with her walker.     3/26  OT and speech therapy consulted.     Patient noted to have blood tinged sputum from tach -- Dr. Moscoso notified.     Patient still waiting on 
discharge by ENT and oncology, to follow up outpatient with oncology for PET scan to determine final staging and establish treatment plan.      Medication reconciliation done time discharge patient 35 minutes 50% time spent counseling and coordination of care     3/21    Patient complained of right scapular area pain. XR to be ordered. Barium swallow results show no problems with swallowing.  WBC 11.4    PT reassessed today and determined that inpatient rehab would be better fit vs SNF.    Will do x-ray of the right shoulder    3/22    Hematology consulted w/ patient regarding squamous cell carcinoma pathology. Family and pt leaning towards rehab and currently awaiting placement. Hematology signed off on patient and wants to ensure she has follow up appointments scheduled with Dr. [facility]ajan ([facility]) and Dr. Weaver ([facility]).     PET scan to be scheduled for OP.     X-ray of right shoulder - no acute process     Patient is sitting up in chair. Today, she has experienced some dizziness when standing from a seated position. Denies headache, chest pain, dyspnea, nausea and vomiting, fever and chills.    3/23  Speech Therapy Consulted w/ patient     Patient sitting comfortably in bed. She still reports occasional dizziness when rising from a seated position, but other than that, no complaints. She is ready for discharge to SNF.     3/24  Waiting for prior auth approval for DC to [facility]   Patient is sitting in chair staying hydrated. States her 2 daughters will be visiting her today.     3/25  Pulmonology consulted - plan to continue current trach care for rehab placement.   Waiting on prior auth approval for DC to [facility].    Added Tessalon 100 mg 3 times a day for cough    Patient's daughter is in the room helping her ambulate to the bathroom with her walker.     3/26  OT and speech therapy consulted.     Patient noted to have blood tinged sputum from tach -- Dr. Moscoso notified.     Patient still waiting on 
outpatient with oncology for PET scan to determine final staging and establish treatment plan.      Medication reconciliation done time discharge patient 35 minutes 50% time spent counseling and coordination of care     3/21    Patient complained of right scapular area pain. XR to be ordered. Barium swallow results show no problems with swallowing.  WBC 11.4    PT reassessed today and determined that inpatient rehab would be better fit vs SNF.    Will do x-ray of the right shoulder    3/22    Hematology consulted w/ patient regarding squamous cell carcinoma pathology. Family and pt leaning towards rehab and currently awaiting placement. Hematology signed off on patient and wants to ensure she has follow up appointments scheduled with Dr. Mahajan ([facility]) and Dr. Weavre ([facility]).     PET scan to be scheduled for OP.     X-ray of right shoulder - no acute process     Patient is sitting up in chair. Today, she has experienced some dizziness when standing from a seated position. Denies headache, chest pain, dyspnea, nausea and vomiting, fever and chills.    3/23  Speech Therapy Consulted w/ patient     Patient sitting comfortably in bed. She still reports occasional dizziness when rising from a seated position, but other than that, no complaints. She is ready for discharge to SNF.     3/24  Waiting for prior auth approval for DC to [facility]   Patient is sitting in chair staying hydrated. States her 2 daughters will be visiting her today.     3/25  Pulmonology consulted - plan to continue current trach care for rehab placement.   Waiting on prior auth approval for DC to [facility].    Added Tessalon 100 mg 3 times a day for cough    Patient's daughter is in the room helping her ambulate to the bathroom with her walker.     3/26  OT and speech therapy consulted.     Patient noted to have blood tinged sputum from tach -- Dr. Moscoso notified.     Patient still waiting on prior auth approval.    Patient was sitting 
Still experiencing productive cough. She is cold in the room, and the daughter stated they put in an order to increase the temperature.    Still awaiting discharge to rehab. Appeal form was faxed back. CM discussed with patient and family about back up plan for rehab. If patient has to go home, will need equipment like rolling walker + 3 in 1 bedside commode.     Signed:   Norma Ivan  3/27/2024  9:47 AM     
in bed, but she was experiencing cough with sputum production.     Patient is in need for fast appeal due to missed peer to peer and is ready for discharge to rehab    3/27  PT consulted on patient     Pt is awake in bed. Daughter is present. She is making sure to stay hydrated. Still experiencing productive cough. She is cold in the room, and the daughter stated they put in an order to increase the temperature.    Still awaiting discharge to rehab. Appeal form was faxed back. CM discussed with patient and family about back up plan for rehab. If patient has to go home, will need equipment like rolling walker + 3 in 1 bedside commode.     3/28    Pulmonology - pt awake, alert, and talking     OT consulted on pt     Insurance denied rehab, pt scheduled for discharge home. Pt must use inhaler orally since trach is cuffless     Pt still experiencing cough, but denies headache, nausea, vomiting, fever or  chills.     3/29    Respiratory Therapist - pt not wearing heated high flow. Given HME for trach as option.    Pt is sitting up eating her breakfast. Cough is chronic and productive     Pt waiting to be discharged until home equipment arrives       Signed:   Norma Ivan  3/29/2024  8:42 AM

## 2024-04-01 NOTE — PLAN OF CARE
PHYSICAL THERAPY TREATMENT     Patient: April Mayo (68 y.o. 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  ASSESSMENT  Patient continues with skilled PT services and is progressing towards goals. Pt semi supine upon PT arrival, agreeable to session. Pt A&O x 4. (See below for objective details and assist levels).     Overall pt tolerated session fair today with supine therex, bed mobility and transfer to recliner. Pt demo's steady progression towards functional goals, limited by activity tolerance. Pt reports fatigue upon entry however motivated to participate in treatment. Performed bed mobility with CGA-SBA and HOB elevated. Able to demo' sit<>stand with CGA and cueing for UE placement. Improved standing balance noted this session, demo'd marilee care in standing with CGA. Performed stand pivot to recliner with cueing for sequence. Upon sitting pt reports fatigue and required additional time for rest break. Pt left seated with daughter in room and all needs in reach. Updating current discharge recommendations from HHPT to SNF when medically appropriate due to decreased activity tolerance and generalized weakness; change discussed and agreed upon with supervising PT Zainab Cooper. Co-signature obtained.  Will continue to benefit from skilled PT services, and will continue to progress as tolerated. Potential barriers for safe discharge: pt is a high fall risk, pt 
         PHYSICAL THERAPY TREATMENT     Patient: April Mayo (68 y.o. 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  ASSESSMENT  Patient continues with skilled PT services and is progressing towards goals. Pt in the recliner upon PT arrival, agreeable to session. Pt A&O x 4. (See below for objective details and assist levels).     Overall pt tolerated session well today with improved mobility.  Improved mobility this visit and pt highly motivated for therapy. Patient CGA transfers and CGA progressing to SBA for ambulation. Patient ambulated steadily with RW and no LOB noted however slower gt speed and rest breaks needed. Two very brief standing rest breaks taken during ambulation and HR stable today, 110-113 as well as SpO2. Patient completed seated therex and introduced supported standing therex today with support of RW. Tolerated well with generalized fatigue and rest break. Patient overall improving well and highly motivated to return to baseline, IND with no AD. Will continue to benefit from skilled PT services, and will continue to progress as tolerated. Potential barriers for safe discharge: concern for pt safely navigating or managing the home environment and new trach care . Current PT DC recommendation Inpatient Rehabilitation Facility  due to current circumstances once medically appropriate.     Start of Session End of Session   SPO2 
         PHYSICAL THERAPY TREATMENT     Patient: April Mayo (68 y.o. 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  ASSESSMENT  Patient continues with skilled PT services and is progressing towards goals. Pt supine in bed upon PT arrival, agreeable to session. (See below for objective details and assist levels).     Overall pt tolerated session well today. RN discontented trach from pt. Pt transferred to eob with no hands on assistance. Pt stood from bed and ambulated with RW ~120 ft with no lob or knee buckling noted. Pt performed seated therex with no complaints of pain or discomfort. Pt transferred from sitting eob into recliner chair and was left sitting with all needs met and family present. Will continue to benefit from skilled PT services, and will continue to progress as tolerated. Potential barriers for safe discharge:. Current PT DC recommendation Inpatient Rehabilitation Facility  once medically appropriate.     Start of Session End of Session   SPO2 (%)  98   Heart Rate (BPM)  98     GOALS:  Problem: Physical Therapy - Adult  Goal: By Discharge: Performs mobility at highest level of function for planned discharge setting.  See evaluation for individualized goals.  Description: FUNCTIONAL STATUS PRIOR TO ADMISSION: Patient was independent and active without use of DME.     HOME SUPPORT PRIOR TO ADMISSION: The 
         PHYSICAL THERAPY TREATMENT     Patient: April Mayo (68 y.o. 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  ASSESSMENT  Patient continues with skilled PT services and is progressing towards goals. Pt semi supine in bed upon PT arrival, agreeable to session. Pt A&O x 4. (See below for objective details and assist levels).  Pt's daughter present at start of session.  AC and PT co-tx during this session as pt had already been up with daughters earlier today and due to limited activity tolerance it was decided to co-tx this session.     Overall pt tolerated session good today with overall supervision for bed mob getting to EOB and returning to semi supine in bed.  Pt able to demo technique without assistance from PT and did not req verbal cuing for technique.  Pt was CGA for sit to stand transfers, SBA for stand pivot transfers.  Pt was able to amb approx 70ft with RW and SBA/CGAx1 without LOB or SOB. Pt does fatigue post approx 70ft during session today and requested to sit down.  Pt was able to demo HEP and was able to recall LAQ, seated marches and PT instructed pt in hip abd/add with pillow during today's session. Pt was able to demo without difficulty.  Pt overall has shown improvement in functional mobility and at this time will upgrade the d/c recommendation to HHPT/OPPT.  Due to pt likely not being able to receive HHPT services as per discussion with CM due to insurance, the next best option woud be for pt to have OPPT as 
         PHYSICAL THERAPY TREATMENT     Patient: April Mayo (68 y.o. 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  ASSESSMENT  Patient continues with skilled PT services and is progressing towards goals. Pt supine in bed upon PT arrival, agreeable to session. (See below for objective details and assist levels).     Overall pt tolerated session well today. Pt transferred to eob with no hands on assist required. Pt stood and ambulated ~100ft with RW and cga/sba. Gait was slow and steady with no lob and no nee buckling noted. Pt took one standing rest break. Pt perforomed stood while waiting for recliner chair to be set up and demonstrated good standing balance. Pt sat and performed LE therex with no complaints of pain or discomfort. Pt left sitting up in chair with all needs met and family present. Will continue to benefit from skilled PT services, and will continue to progress as tolerated. Potential barriers for safe discharge:. Current PT DC recommendation Outpatient Therapy  once medically appropriate.    GOALS:  Problem: Physical Therapy - Adult  Goal: By Discharge: Performs mobility at highest level of function for planned discharge setting.  See evaluation for individualized goals.  Description: FUNCTIONAL STATUS PRIOR TO ADMISSION: Patient was independent and active without use of DME.     HOME SUPPORT PRIOR TO ADMISSION: 
    Speech LAnguage Pathology Dysphagia AND PMV/trach care TREATMENT    Patient: April Mayo (68 y.o. female)  Date: 3/29/2024  Primary Diagnosis: Dysphonia [R49.0]  Vocal cord mass [J38.3]  Procedure(s) (LRB):  AWAKE URGENT TRACHEOSTOMY AND BIOPSY (LOCAL WITH ZAIDA [place]) (N/A) 15 Days Post-Op   Precautions: Aspiration General Precautions                DIET RECOMMENDATIONS: Regular and thin liquids, meds as tolerated,    SWALLOW SAFETY PRECAUTIONS: Slow rate of intake, wear PMV as tolerated, liquid wash, double swallow, effortful swallow and remain upright 30 mins after PO.    ASSESSMENT :  Patient sitting in chair w/ daughters at bedside. Family session for demonstration of trach care, dysphagia and PMV knowledge. Patient and family able to independently recall purpose and procedures of trach care including cleaning and when to suction. Family demonstrated changing inner cannula, tracheal suctioning with sterile procedure, donning/doffing PMV (patient and family), applying gauze and general cleaning. Reviewed using trach shower bib during showers. Patient has HME and encouraged to use humidified air at home especially at night. Family feels confident with trach care. Extra PMV and HME provided to patient. Minimal thick secretions w/ tannish color (change from previous sessions).    Sw tx: patient reports concerns her throat lozenge or cough syrup was cause of bright red tinge in trach. Educated on concerns if any PO is suctioned through tracheostomy and indication of aspiration. Green food coloring trial there is no indication of green dye during suctioning. Oropharyngeal swallow is wfl and consistent w/ MBS. Notified staff and family to notify SLP if any green coloring is suctioned in next 24 hours.   Patient and family continue to be actively engaged in tx.     GOALS:    Problem: SLP Adult - Impaired Swallowing  Goal: By Discharge: Advance to least restrictive diet without signs or symptoms of aspiration 
  Problem: Anxiety  Goal: Will report anxiety at manageable levels  Outcome: Progressing     Problem: SLP Adult - Impaired Swallowing  Goal: By Discharge: Advance to least restrictive diet without signs or symptoms of aspiration for planned discharge setting.  See evaluation for individualized goals.  3/17/2024 1613 by Karin Caballero, SLP  Outcome: Progressing     
  Problem: Physical Therapy - Adult  Goal: By Discharge: Performs mobility at highest level of function for planned discharge setting.  See evaluation for individualized goals.  Description: FUNCTIONAL STATUS PRIOR TO ADMISSION: Patient was independent and active without use of DME.     HOME SUPPORT PRIOR TO ADMISSION: The patient lived with daugther but did not require assistance.     Physical Therapy Goals  Initiated 3/17/2024  Updated 3/25/2024  Pt stated goal: \" To feel better\"  Pt will be I with LE HEP in 7 days.- goal met 3/27/24 pt was able to demo LE HEP to therapist without cues  Pt will perform bed mobility with [place] in 7 days.- goal partially met (pt is supervision, does not req assist for bed mob)  Pt will perform transfers with [place] in 7 days.   Pt will amb 150-200 feet with LRAD safely with Modified [place] in 7 days.  Outcome: Progressing            PHYSICAL THERAPY TREATMENT     Patient: April Mayo (68 y.o. 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  ASSESSMENT  Patient continues with skilled PT services and is progressing towards goals. Pt semi-supine upon PT arrival, agreeable to session. Pt A&O x 4. Daughter present in room for entire session with permission from patient. (See below for objective details and assist levels).     Overall pt tolerated session good today with bed mobility performed with supervision, incr time to come to sitting on EOB and use 
  Problem: Safety - Adult  Goal: Free from fall injury  3/16/2024 0820 by Maegan Catherine, RN  Outcome: Progressing  3/16/2024 0400 by Lauren Meyer, RN  Outcome: Progressing     Problem: Pain  Goal: Verbalizes/displays adequate comfort level or baseline comfort level  Outcome: Progressing     
  Problem: Safety - Adult  Goal: Free from fall injury  3/16/2024 0820 by Mara Draper LPN  Outcome: Progressing  3/16/2024 0820 by Maegan Catherine RN  Outcome: Progressing  3/16/2024 0400 by Lauren Meyer RN  Outcome: Progressing     Problem: Anxiety  Goal: Will report anxiety at manageable levels  Description: INTERVENTIONS:  1. Administer medication as ordered  2. Teach and rehearse alternative coping skills  3. Provide emotional support with 1:1 interaction with staff  3/16/2024 0400 by Lauren Meyer RN  Outcome: Progressing     Problem: Respiratory - Adult  Goal: Achieves optimal ventilation and oxygenation  Outcome: Progressing     
  Problem: Safety - Adult  Goal: Free from fall injury  3/18/2024 0934 by Salina Worthington RN  Outcome: Progressing  3/18/2024 0005 by Snow Menard RN  Outcome: Progressing     Problem: Anxiety  Goal: Will report anxiety at manageable levels  Description: INTERVENTIONS:  1. Administer medication as ordered  2. Teach and rehearse alternative coping skills  3. Provide emotional support with 1:1 interaction with staff  3/18/2024 0005 by Snow Menard RN  Outcome: Progressing     Problem: Coping  Goal: Pt/Family able to verbalize concerns and demonstrate effective coping strategies  Description: INTERVENTIONS:  1. Assist patient/family to identify coping skills, available support systems and cultural and spiritual values  2. Provide emotional support, including active listening and acknowledgement of concerns of patient and caregivers  3. Reduce environmental stimuli, as able  4. Instruct patient/family in relaxation techniques, as appropriate  5. Assess for spiritual pain/suffering and initiate Spiritual Care, Psychosocial Clinical Specialist consults as needed  3/18/2024 0934 by Salina Worthington RN  Outcome: Progressing  3/18/2024 0005 by Snow Menard RN  Outcome: Progressing     Problem: Change in Body Image  Goal: Pt/Family communicate acceptance of loss or change in body image and feel psychological comfort and peace  Description: INTERVENTIONS:  1. Assess patient/family anxiety and grief process related to change in body image, loss of functional status, loss of sense of self, and forgiveness  2. Provide emotional and spiritual support  3. Provide information about the patient's health status with consideration of family and cultural values  4. Communicate willingness to discuss loss and facilitate grief process with patient/family as appropriate  5. Emphasize sustaining relationships within family system and community, or hardik/spiritual traditions  6. Initiate Spiritual Care, Psychosocial 
  Problem: Safety - Adult  Goal: Free from fall injury  3/21/2024 1516 by Kailee Jeffries RN  Outcome: Progressing  3/21/2024 0626 by Snow Menard RN  Outcome: Progressing     Problem: Coping  Goal: Pt/Family able to verbalize concerns and demonstrate effective coping strategies  Description: INTERVENTIONS:  1. Assist patient/family to identify coping skills, available support systems and cultural and spiritual values  2. Provide emotional support, including active listening and acknowledgement of concerns of patient and caregivers  3. Reduce environmental stimuli, as able  4. Instruct patient/family in relaxation techniques, as appropriate  5. Assess for spiritual pain/suffering and initiate Spiritual Care, Psychosocial Clinical Specialist consults as needed  3/21/2024 1516 by Kailee Jeffries RN  Outcome: Progressing  3/21/2024 0626 by Snow Menard RN  Outcome: Progressing     
  Problem: Safety - Adult  Goal: Free from fall injury  3/22/2024 0548 by Priya Lopez RN  Outcome: Progressing  3/22/2024 0133 by Priya Lopez RN  Outcome: Progressing     Problem: Anxiety  Goal: Will report anxiety at manageable levels  Description: INTERVENTIONS:  1. Administer medication as ordered  2. Teach and rehearse alternative coping skills  3. Provide emotional support with 1:1 interaction with staff  3/22/2024 0548 by Priya Lopez RN  Outcome: Progressing  3/22/2024 0133 by Priya Lopez RN  Outcome: Progressing  Flowsheets (Taken 3/21/2024 2010)  Will report anxiety at manageable levels: Teach and rehearse alternative coping skills     Problem: Discharge Planning  Goal: Discharge to home or other facility with appropriate resources  Outcome: Progressing     Problem: Respiratory - Adult  Goal: Achieves optimal ventilation and oxygenation  Outcome: Progressing     Problem: Skin/Tissue Integrity - Adult  Goal: Incisions, wounds, or drain sites healing without S/S of infection  Outcome: Progressing     Problem: Pain  Goal: Verbalizes/displays adequate comfort level or baseline comfort level  Outcome: Progressing     
  Problem: Safety - Adult  Goal: Free from fall injury  3/28/2024 2143 by Demi Muse, RN  Outcome: Progressing     Problem: Anxiety  Goal: Will report anxiety at manageable levels  Description: INTERVENTIONS:  1. Administer medication as ordered  2. Teach and rehearse alternative coping skills  3. Provide emotional support with 1:1 interaction with staff  3/28/2024 2143 by Demi Muse, RN  Outcome: Progressing      Problem: Skin/Tissue Integrity - Adult  Goal: Incisions, wounds, or drain sites healing without S/S of infection  Outcome: Progressing       Problem: ABCDS Injury Assessment  Goal: Absence of physical injury  Outcome: Progressing         
  Problem: Safety - Adult  Goal: Free from fall injury  3/29/2024 0817 by Maegan Catherine, RN  Outcome: Progressing  3/28/2024 2143 by Demi Muse, RN  Outcome: Progressing     Problem: Pain  Goal: Verbalizes/displays adequate comfort level or baseline comfort level  Outcome: Progressing     
  Problem: Safety - Adult  Goal: Free from fall injury  3/31/2024 0948 by Caden Garcias RN  Outcome: Progressing  3/30/2024 2244 by Lauren Meyer RN  Outcome: Progressing     Problem: Anxiety  Goal: Will report anxiety at manageable levels  Description: INTERVENTIONS:  1. Administer medication as ordered  2. Teach and rehearse alternative coping skills  3. Provide emotional support with 1:1 interaction with staff  Outcome: Progressing     Problem: Coping  Goal: Pt/Family able to verbalize concerns and demonstrate effective coping strategies  Description: INTERVENTIONS:  1. Assist patient/family to identify coping skills, available support systems and cultural and spiritual values  2. Provide emotional support, including active listening and acknowledgement of concerns of patient and caregivers  3. Reduce environmental stimuli, as able  4. Instruct patient/family in relaxation techniques, as appropriate  5. Assess for spiritual pain/suffering and initiate Spiritual Care, Psychosocial Clinical Specialist consults as needed  3/31/2024 0948 by Caden Garcias RN  Outcome: Progressing  3/30/2024 2244 by Lauren Meyer RN  Outcome: Progressing     Problem: Change in Body Image  Goal: Pt/Family communicate acceptance of loss or change in body image and feel psychological comfort and peace  Description: INTERVENTIONS:  1. Assess patient/family anxiety and grief process related to change in body image, loss of functional status, loss of sense of self, and forgiveness  2. Provide emotional and spiritual support  3. Provide information about the patient's health status with consideration of family and cultural values  4. Communicate willingness to discuss loss and facilitate grief process with patient/family as appropriate  5. Emphasize sustaining relationships within family system and community, or hardik/spiritual traditions  6. Initiate Spiritual Care, Psychosocial Clinical Specialist consult as needed  Outcome: 
  Problem: Safety - Adult  Goal: Free from fall injury  3/31/2024 0948 by Caden Garcias, RN  Outcome: Progressing     Problem: Anxiety  Goal: Will report anxiety at manageable levels  Description: INTERVENTIONS:  1. Administer medication as ordered  2. Teach and rehearse alternative coping skills  3. Provide emotional support with 1:1 interaction with staff  3/31/2024 0948 by Caden Garcias, RN  Outcome: Progressing     Problem: Coping  Goal: Pt/Family able to verbalize concerns and demonstrate effective coping strategies  Description: INTERVENTIONS:  1. Assist patient/family to identify coping skills, available support systems and cultural and spiritual values  2. Provide emotional support, including active listening and acknowledgement of concerns of patient and caregivers  3. Reduce environmental stimuli, as able  4. Instruct patient/family in relaxation techniques, as appropriate  5. Assess for spiritual pain/suffering and initiate Spiritual Care, Psychosocial Clinical Specialist consults as needed  3/31/2024 2228 by Luaren Meyer, RN  Outcome: Progressing  3/31/2024 0948 by Caden Garcias RN  Outcome: Progressing     Problem: Discharge Planning  Goal: Discharge to home or other facility with appropriate resources  3/31/2024 0948 by Caden Garcias, RN  Outcome: Progressing     
  Problem: Safety - Adult  Goal: Free from fall injury  Outcome: Progressing     
  Problem: Safety - Adult  Goal: Free from fall injury  Outcome: Progressing     Problem: Anxiety  Goal: Will report anxiety at manageable levels  Description: INTERVENTIONS:  1. Administer medication as ordered  2. Teach and rehearse alternative coping skills  3. Provide emotional support with 1:1 interaction with staff  3/18/2024 0005 by Snow Menard, RN  Outcome: Progressing  3/17/2024 1839 by Anamaria Lantigua RN  Outcome: Progressing     Problem: Change in Body Image  Goal: Pt/Family communicate acceptance of loss or change in body image and feel psychological comfort and peace  Description: INTERVENTIONS:  1. Assess patient/family anxiety and grief process related to change in body image, loss of functional status, loss of sense of self, and forgiveness  2. Provide emotional and spiritual support  3. Provide information about the patient's health status with consideration of family and cultural values  4. Communicate willingness to discuss loss and facilitate grief process with patient/family as appropriate  5. Emphasize sustaining relationships within family system and community, or hardik/spiritual traditions  6. Initiate Spiritual Care, Psychosocial Clinical Specialist consult as needed  Outcome: Progressing     Problem: Coping  Goal: Pt/Family able to verbalize concerns and demonstrate effective coping strategies  Description: INTERVENTIONS:  1. Assist patient/family to identify coping skills, available support systems and cultural and spiritual values  2. Provide emotional support, including active listening and acknowledgement of concerns of patient and caregivers  3. Reduce environmental stimuli, as able  4. Instruct patient/family in relaxation techniques, as appropriate  5. Assess for spiritual pain/suffering and initiate Spiritual Care, Psychosocial Clinical Specialist consults as needed  Outcome: Progressing     Problem: Chronic Conditions and Co-morbidities  Goal: Patient's chronic conditions and 
  Problem: Safety - Adult  Goal: Free from fall injury  Outcome: Progressing     Problem: Anxiety  Goal: Will report anxiety at manageable levels  Description: INTERVENTIONS:  1. Administer medication as ordered  2. Teach and rehearse alternative coping skills  3. Provide emotional support with 1:1 interaction with staff  Outcome: Progressing     
  Problem: Safety - Adult  Goal: Free from fall injury  Outcome: Progressing     Problem: Anxiety  Goal: Will report anxiety at manageable levels  Description: INTERVENTIONS:  1. Administer medication as ordered  2. Teach and rehearse alternative coping skills  3. Provide emotional support with 1:1 interaction with staff  Outcome: Progressing     Problem: Coping  Goal: Pt/Family able to verbalize concerns and demonstrate effective coping strategies  Description: INTERVENTIONS:  1. Assist patient/family to identify coping skills, available support systems and cultural and spiritual values  2. Provide emotional support, including active listening and acknowledgement of concerns of patient and caregivers  3. Reduce environmental stimuli, as able  4. Instruct patient/family in relaxation techniques, as appropriate  5. Assess for spiritual pain/suffering and initiate Spiritual Care, Psychosocial Clinical Specialist consults as needed  Outcome: Progressing     
  Problem: Safety - Adult  Goal: Free from fall injury  Outcome: Progressing     Problem: Anxiety  Goal: Will report anxiety at manageable levels  Description: INTERVENTIONS:  1. Administer medication as ordered  2. Teach and rehearse alternative coping skills  3. Provide emotional support with 1:1 interaction with staff  Outcome: Progressing     Problem: Coping  Goal: Pt/Family able to verbalize concerns and demonstrate effective coping strategies  Description: INTERVENTIONS:  1. Assist patient/family to identify coping skills, available support systems and cultural and spiritual values  2. Provide emotional support, including active listening and acknowledgement of concerns of patient and caregivers  3. Reduce environmental stimuli, as able  4. Instruct patient/family in relaxation techniques, as appropriate  5. Assess for spiritual pain/suffering and initiate Spiritual Care, Psychosocial Clinical Specialist consults as needed  Outcome: Progressing     Problem: Change in Body Image  Goal: Pt/Family communicate acceptance of loss or change in body image and feel psychological comfort and peace  Description: INTERVENTIONS:  1. Assess patient/family anxiety and grief process related to change in body image, loss of functional status, loss of sense of self, and forgiveness  2. Provide emotional and spiritual support  3. Provide information about the patient's health status with consideration of family and cultural values  4. Communicate willingness to discuss loss and facilitate grief process with patient/family as appropriate  5. Emphasize sustaining relationships within family system and community, or hardik/spiritual traditions  6. Initiate Spiritual Care, Psychosocial Clinical Specialist consult as needed  Outcome: Progressing     Problem: Decision Making  Goal: Pt/Family able to effectively weigh alternatives and participate in decision making related to treatment and care  Description: INTERVENTIONS:  1. 
  Problem: Safety - Adult  Goal: Free from fall injury  Outcome: Progressing     Problem: Anxiety  Goal: Will report anxiety at manageable levels  Description: INTERVENTIONS:  1. Administer medication as ordered  2. Teach and rehearse alternative coping skills  3. Provide emotional support with 1:1 interaction with staff  Outcome: Progressing     Problem: Coping  Goal: Pt/Family able to verbalize concerns and demonstrate effective coping strategies  Description: INTERVENTIONS:  1. Assist patient/family to identify coping skills, available support systems and cultural and spiritual values  2. Provide emotional support, including active listening and acknowledgement of concerns of patient and caregivers  3. Reduce environmental stimuli, as able  4. Instruct patient/family in relaxation techniques, as appropriate  5. Assess for spiritual pain/suffering and initiate Spiritual Care, Psychosocial Clinical Specialist consults as needed  Outcome: Progressing     Problem: Change in Body Image  Goal: Pt/Family communicate acceptance of loss or change in body image and feel psychological comfort and peace  Description: INTERVENTIONS:  1. Assess patient/family anxiety and grief process related to change in body image, loss of functional status, loss of sense of self, and forgiveness  2. Provide emotional and spiritual support  3. Provide information about the patient's health status with consideration of family and cultural values  4. Communicate willingness to discuss loss and facilitate grief process with patient/family as appropriate  5. Emphasize sustaining relationships within family system and community, or hardik/spiritual traditions  6. Initiate Spiritual Care, Psychosocial Clinical Specialist consult as needed  Outcome: Progressing     Problem: Decision Making  Goal: Pt/Family able to effectively weigh alternatives and participate in decision making related to treatment and care  Description: INTERVENTIONS:  1. 
  Problem: Safety - Adult  Goal: Free from fall injury  Outcome: Progressing     Problem: Anxiety  Goal: Will report anxiety at manageable levels  Description: INTERVENTIONS:  1. Administer medication as ordered  2. Teach and rehearse alternative coping skills  3. Provide emotional support with 1:1 interaction with staff  Outcome: Progressing  Flowsheets (Taken 3/26/2024 2019)  Will report anxiety at manageable levels:   Administer medication as ordered   Provide emotional support with 1:1 interaction with staff     Problem: Coping  Goal: Pt/Family able to verbalize concerns and demonstrate effective coping strategies  Description: INTERVENTIONS:  1. Assist patient/family to identify coping skills, available support systems and cultural and spiritual values  2. Provide emotional support, including active listening and acknowledgement of concerns of patient and caregivers  3. Reduce environmental stimuli, as able  4. Instruct patient/family in relaxation techniques, as appropriate  5. Assess for spiritual pain/suffering and initiate Spiritual Care, Psychosocial Clinical Specialist consults as needed  Outcome: Progressing  Flowsheets (Taken 3/26/2024 2019)  Patient/family able to verbalize anxieties, fears, and concerns, and demonstrate effective coping:   Assist patient/family to identify coping skills, available support systems and cultural and spiritual values   Provide emotional support, including active listening and acknowledgement of concerns of patient and caregivers     Problem: Change in Body Image  Goal: Pt/Family communicate acceptance of loss or change in body image and feel psychological comfort and peace  Description: INTERVENTIONS:  1. Assess patient/family anxiety and grief process related to change in body image, loss of functional status, loss of sense of self, and forgiveness  2. Provide emotional and spiritual support  3. Provide information about the patient's health status with consideration of 
  Problem: Safety - Adult  Goal: Free from fall injury  Outcome: Progressing     Problem: Coping  Goal: Pt/Family able to verbalize concerns and demonstrate effective coping strategies  Description: INTERVENTIONS:  1. Assist patient/family to identify coping skills, available support systems and cultural and spiritual values  2. Provide emotional support, including active listening and acknowledgement of concerns of patient and caregivers  3. Reduce environmental stimuli, as able  4. Instruct patient/family in relaxation techniques, as appropriate  5. Assess for spiritual pain/suffering and initiate Spiritual Care, Psychosocial Clinical Specialist consults as needed  Outcome: Progressing     Problem: Discharge Planning  Goal: Discharge to home or other facility with appropriate resources  Outcome: Progressing     Problem: Skin/Tissue Integrity - Adult  Goal: Incisions, wounds, or drain sites healing without S/S of infection  Outcome: Progressing     Problem: Pain  Goal: Verbalizes/displays adequate comfort level or baseline comfort level  Outcome: Progressing     Problem: Skin/Tissue Integrity  Goal: Absence of new skin breakdown  Description: 1.  Monitor for areas of redness and/or skin breakdown  2.  Assess vascular access sites hourly  3.  Every 4-6 hours minimum:  Change oxygen saturation probe site  4.  Every 4-6 hours:  If on nasal continuous positive airway pressure, respiratory therapy assess nares and determine need for appliance change or resting period.  Outcome: Progressing     
  Problem: Safety - Adult  Goal: Free from fall injury  Outcome: Progressing     Problem: Pain  Goal: Verbalizes/displays adequate comfort level or baseline comfort level  Outcome: Progressing     
PHYSICAL THERAPY REEVALUATION  Patient: April Mayo (68 y.o. 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      ASSESSMENT  Patient initially seen for PT evaluation 3/17/2024 and 4 skilled PT sessions since evalution. Patient seen today for PT reevaluation s/t LOS. Patient A&O x4. Pt seated in bed upon arrival, agreeable to session.      Based on the objective data described, the patient currently presents with impaired functional mobility, decreased independence in ADLs, impaired strength, decreased activity tolerance, and impaired balance. (See below for objective details and assist levels).    Overall pt tolerated session good today, currently with c/o discomfort with coughing, no c/o pain. Pt continues to demo increased independence with all transfers and functional mobility and improved activity tolerance. Pt continues to require CGA and use of AD for all functional mobility, pt amb 100' with RW and CGA without LOB, HR peaked at 106bpm spO2 maintained >95% with all mobility. Pt was independent with no use of DME at baseline, and continues to require AD for all functional mobility d/t overall strength deficits and dynamic balance impairments. Pt will benefit from continued skilled PT to address above deficits and return to PLOF, PT goals and POC reviewed on this date and updated to reflect current progress. 
Speech LAnguage Pathology Dysphagia TREATMENT    Patient: April Mayo (68 y.o. 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     SWALLOW SAFETY PRECAUTIONS: 1:1 assistance with ALL PO intake, STRICT aspiration and GERD precautions, monitor pt closely for s/s aspiration, meds as tolerated, FEED ONLY IF AWAKE AND ALERT.      ASSESSMENT :  Based on the objective data described below, the patient presents with stable oropharyngeal sw function with improved secretion management and coughing this date.     Pt seen for follow-up, positioned upright in bed.   Pt with #6 Shiley cuffed trach and cuff is inflated at time of session. Pt remains on in-line trach collar for humidified air. Elevated BP persists this date.   MD note reviewed with recs to deflate cuff for meals/meds. Discussed with nsg- MD placed orders for cuff deflation during PO Intake as cuff has remained inflated during PO since being re-inflated following SLP assessment. MD has ordered cuff inflation to limit coughing due to increased subq emphysema following trach placement. This has improved this date.      Pt given trials of thin via straw, puree and min trial solids. Oral phase WFL. Pharyngeal phase WFL to palpation. No overt s/s aspiration but risk remains.    Delayed cough is present, appears incidental.  Following trials, trach suctioned by SLP with scant return of thick secretions.     Pt continues to communicate effectively via writing and mouthing words.     Oncology treatment plan pending.     Patient will benefit from skilled intervention to address the above impairments.    GOALS:  Problem: SLP Adult - Impaired Swallowing  Goal: By Discharge: Advance to 
Speech LAnguage Pathology Dysphagia TREATMENT    Patient: April Mayo (68 y.o. 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 Nurse reports improved secretion management and coughing this date. Nurse also reports poor PO intake.     Patient seen for follow-up, positioned upright in bed. Patient recently received pain medication for shoulder pain and was not wanting to eat, but was agreeable to take a few sips of her chicken broth and juice. Oral phase WFL. Pharyngeal phase WFL to palpation. No overt s/s aspiration but risk remains.    Following trials, trach suctioned by SLP with scant return of thick secretions.      Pt continues to communicate effectively via writing and mouthing words.      PLAN :  Recommendations and Planned Interventions:  Diet: Full liquid and thin liquids  SLP to continue to follow POC     Acute SLP Services: Yes, SLP will continue to follow per plan of care.    Discharge Recommendations: To Be Determined     SUBJECTIVE:   I don't feel too good; my shoulder is hurting.    OBJECTIVE:     Past Medical History:   Diagnosis Date    Arthritis     Graves disease     Hypertension      Past Surgical History:   Procedure Laterality Date    BUNIONECTOMY      HYSTERECTOMY (CERVIX STATUS UNKNOWN)      HYSTERECTOMY, VAGINAL      OTHER SURGICAL HISTORY      eye surgery for graves disease    TRACHEOSTOMY N/A 3/14/2024    AWAKE URGENT TRACHEOSTOMY AND BIOPSY (LOCAL WITH ZAIDA NIEVES) performed by Vaishali Pringle MD at [facility] MAIN OR     Prior Level of Function/Home Situation:   Social/Functional History  Lives With: Daughter, Family  Type of Home: House  ADL Assistance: 
Speech LAnguage Pathology Dysphagia TREATMENT    Patient: April Mayo (68 y.o. 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 Nurse reports improved secretion management, coughing, and PO intake.     Patient seen for follow-up, positioned upright in chair with daughters and sons present. Patient stated she was hungry (mouthed due to trach) and accepted applesauce and apple juice. Oral phase WFL. Patient noted to take small bites with slow mastication, but functional. Pharyngeal phase WFL to palpation. No overt s/s aspiration but risk remains. No suctioning was required this date.     PLAN :  Recommendations and Planned Interventions:  Diet: Full liquid and thin liquids  ST to continue POC     Acute SLP Services: Yes, SLP will continue to follow per plan of care.    Discharge Recommendations: To Be Determined     SUBJECTIVE:   I'm hungry. (Mouth not verbalized due to trach)    OBJECTIVE:     Past Medical History:   Diagnosis Date    Arthritis     Graves disease     Hypertension      Past Surgical History:   Procedure Laterality Date    BUNIONECTOMY      HYSTERECTOMY (CERVIX STATUS UNKNOWN)      HYSTERECTOMY, VAGINAL      OTHER SURGICAL HISTORY      eye surgery for graves disease    TRACHEOSTOMY N/A 3/14/2024    AWAKE URGENT TRACHEOSTOMY AND BIOPSY (LOCAL WITH ZAIDA NIEVES) performed by Vaishali Pringle MD at [facility] MAIN OR     Prior Level of Function/Home Situation:   Social/Functional History  Lives With: Daughter  Type of Home: House  Home Layout: One level  Home Access: Stairs to enter with rails  Entrance Stairs - Number of Steps: 2  Entrance Stairs - Rails: None  Bathroom Toilet: 
Speech LAnguage Pathology Dysphagia TREATMENT    Patient: April Mayo (68 y.o. 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      ASSESSMENT :  Based on the objective data described below, the patient presents with oropharyngeal sw function appears WFL, aspiration risk noted given vocal cord mass and presence of trach.    Pt seen for follow-up, alert, agreeable. Daughter is present at bedside with pt's consent. Pt has had trach change out with ENT prior to session. Pt now with #6 shiley cuffless trach.   Pt communicates via mouthing words and daughter aids in communication.   Pt reports trach feels better now.   Pt given trials of thin via straw, puree and soft solids. Oral phase WFL.  Pharyngeal phase appears WFL to palpation and no overt s/s aspiration.  Will plan for MBS to rule out silent aspiration and to further assess sw function given vocal cord mass and new trach.   Finger occlusion trialed with pt and pt able to produce short rough phonation.  Plan for PMV assessment next visit.  Unable to assess this date due to timing of trach change out.      Pt and daughter requesting RT to review trach care with nsg and pt/family.  Request relayed to RT and nsg, RT reports will follow up for pt/family and nsg education.     Patient will benefit from skilled intervention to address the above impairments.    GOALS:  Problem: SLP Adult - Impaired Swallowing  Goal: By Discharge: Advance to least restrictive diet without signs or symptoms of 
  Patient is motivated throughout both session.     GOAL:    Problem: SLP Adult - Impaired Swallowing  Goal: By Discharge: Advance to least restrictive diet without signs or symptoms of aspiration for planned discharge setting.  See evaluation for individualized goals.  Description: Speech Therapy Swallow Goals    Initiated 3/14/2024    -Patient stated goal: Pt is eager to feel better    -Patient will tolerate Clear liquid and thin liquids diet without clinical indicators of aspiration given no cues within 7 day(s).        -Patient will tolerate PO trials without clinical indicators of aspiration given no cues within 7 day(s).      -Patient will participate in modified barium swallow study within 7 day(s).      -Patient will demonstrate understanding of swallow safety precautions and aspiration precautions, diet recs with no cues within 7 day(s).           Outcome: Progressing     Problem: SLP Adult - Impaired Communication  Goal: By Discharge: Demonstrates communication skills at highest level of function for planned discharge setting.  See evaluation for individualized goals.  Description: -Patient will tolerate PMV w/ free air movement to meet basic wants/needs.    -Patient will independently doff/rafael PMV.   Outcome: Progressing          PLAN :  Recommendations and Planned Interventions:  DIET RECOMMENDATIONS: Regular and thin liquids meds as tolerated     SWALLOW SAFETY PRECAUTIONS: Wear PMV as tolerated during meals, slow rate of intake, small bites/sips, and remain upright 30 mins after PO     Acute SLP Services: Yes, SLP will continue to follow per plan of care.    Discharge Recommendations: Inpatient Rehab     SUBJECTIVE:   Patient reports she felt SOB last night.     OBJECTIVE:     Past Medical History:   Diagnosis Date    Arthritis     Graves disease     Hypertension      Past Surgical History:   Procedure Laterality Date    BUNIONECTOMY      HYSTERECTOMY (CERVIX STATUS UNKNOWN)      HYSTERECTOMY, VAGINAL 
Determine when there are differences between patient's view, family's view, and healthcare provider's view of condition  2. Facilitate patient and family articulation of goals for care  3. Help patient and family identify pros/cons of alternative solutions  4. Provide information as requested by patient/family  5. Respect patient/family right to receive or not to receive information  6. Serve as a liaison between patient and family and health care team  7. Initiate Consults from Ethics, Palliative Care or initiate Family Care Conference as is appropriate  Outcome: Progressing     Problem: Discharge Planning  Goal: Discharge to home or other facility with appropriate resources  Outcome: Progressing  Flowsheets (Taken 3/20/2024 1949)  Discharge to home or other facility with appropriate resources: Identify barriers to discharge with patient and caregiver     Problem: Chronic Conditions and Co-morbidities  Goal: Patient's chronic conditions and co-morbidity symptoms are monitored and maintained or improved  Outcome: Progressing  Flowsheets (Taken 3/20/2024 1949)  Care Plan - Patient's Chronic Conditions and Co-Morbidity Symptoms are Monitored and Maintained or Improved: Monitor and assess patient's chronic conditions and comorbid symptoms for stability, deterioration, or improvement     Problem: Respiratory - Adult  Goal: Achieves optimal ventilation and oxygenation  Outcome: Progressing     Problem: Skin/Tissue Integrity - Adult  Goal: Incisions, wounds, or drain sites healing without S/S of infection  Outcome: Progressing     Problem: ABCDS Injury Assessment  Goal: Absence of physical injury  Outcome: Progressing     Problem: Pain  Goal: Verbalizes/displays adequate comfort level or baseline comfort level  Outcome: Progressing     Problem: Skin/Tissue Integrity  Goal: Absence of new skin breakdown  Description: 1.  Monitor for areas of redness and/or skin breakdown  2.  Assess vascular access sites hourly  3.  Every 
right hand and then anchors to trach to attach the PMV, pt is more successful with PMV placement. Pt demonstrates some difficulty with donning but this improves with repetition and prompts.  Pt doffs PMV without difficulty. 02 sats 98% and HR 80's throughout PMV trials. Pt's vocal quality is breathy with reduced amplitude.  Pt reports taking PMV off previously this date due to feeling as though she is straining her voice.  Clinician reinforced that pt was correct in doing so.   Nsg changed inner cannula during session as well.     Reviewed with pt re: need to continue to monitor tolerance of PMV and remove PMV if s/s of poor tolerance occur.  Vocal hygiene including avoiding yelling, avoiding vocal straining, vocal rest, setting clear communication expectations and using non-verbal communication when needed also discussed.  Possible further changes in vocal function/quality also discussed.  Pt is aware of recs to have some kind of effective noise maker for safety and attention such as a bell.  Pt and family verbalize and express understanding.     Patient will benefit from skilled intervention to address the above impairments.    GOALS:    Problem: SLP Adult - Impaired Communication  Goal: By Discharge: Demonstrates communication skills at highest level of function for planned discharge setting.  See evaluation for individualized goals.  Description: -Patient will tolerate PMV w/ free air movement to meet basic wants/needs.    -Patient will independently doff/rafael PMV.   Outcome: Progressing    Problem: SLP Adult - Impaired Swallowing  Goal: By Discharge: Advance to least restrictive diet without signs or symptoms of aspiration for planned discharge setting.  See evaluation for individualized goals.  Description: Speech Therapy Swallow Goals    Initiated 3/14/2024    -Patient stated goal: Pt is eager to feel better    -Patient will tolerate Clear liquid and thin liquids diet without clinical indicators of aspiration 
skilled PT to address above deficits and return to PLOF. Potential barriers for safe discharge: pt is a high fall risk and concern for pt safely navigating or managing the home environment. Current PT DC recommendation Home with Home Health Therapy and family assist once medically appropriate.     Start of Session End of Session   SPO2 (%) 98 99   Heart Rate (BPM) 70 87     GOALS:    Problem: Physical Therapy - Adult  Goal: By Discharge: Performs mobility at highest level of function for planned discharge setting.  See evaluation for individualized goals.  Note: FUNCTIONAL STATUS PRIOR TO ADMISSION: Patient was independent and active without use of DME.    HOME SUPPORT PRIOR TO ADMISSION: The patient lived with daugther but did not require assistance.    Physical Therapy Goals  Initiated 3/17/2024  Pt stated goal: \" To feel better\"  Pt will be I with LE HEP in 7 days.  Pt will perform bed mobility with Modified [place] in 7 days.  Pt will perform transfers with Modified [place] in 7 days.   Pt will amb 20 feet with LRAD safely with Modified [place] and Stand by Assist in 7 days.          PLAN :  Recommendations and Planned Interventions: bed mobility training, transfer training, gait training, therapeutic exercises, and neuromuscular re-education    Frequency/Duration: Patient will be followed by physical therapy:  3-5x/week to address goals.    Recommendation for discharge: (in order for the patient to meet his/her long term goals)  Home with Home Health Therapy and family assist    IF patient discharges home will need the following DME: rolling walker       SUBJECTIVE:   Patient stated “to feel better.”    OBJECTIVE DATA SUMMARY:   HISTORY:    Past Medical History:   Diagnosis Date    Arthritis     Graves disease     Hypertension      Past Surgical History:   Procedure Laterality Date    BUNIONECTOMY      HYSTERECTOMY (CERVIX STATUS UNKNOWN)      HYSTERECTOMY, VAGINAL      OTHER SURGICAL HISTORY   
with slow cheryl, step-to gait pattern, and fwd flexed trunk, no overt LOB or knee buckling episodes noted. Pt completed stand > sit with Daniella for controlled descent to chair. At baseline, pt was independent with all functional mobility and ADLs.   Will continue to benefit from skilled PT services to improve activity tolerance and overall functional strength to promote return to PLOF and safe return to home. Potential barriers for safe discharge: . Due to pt's progress with activity tolerance and functional mobility and prior level of function, updating discharge recommendations at this time to Inpatient Rehabilitation Facility. Previous PT DC recommendation SNF, Current PT DC recommendation Inpatient Rehabilitation Facility  once medically appropriate.     Start of Session End of Session   SPO2 (%) 98 97 with activity   Heart Rate (BPM) 112 122 bed mob  124 post-ambulation     GOALS:    Problem: Physical Therapy - Adult  Goal: By Discharge: Performs mobility at highest level of function for planned discharge setting.  See evaluation for individualized goals.  Description: FUNCTIONAL STATUS PRIOR TO ADMISSION: Patient was independent and active without use of DME.     HOME SUPPORT PRIOR TO ADMISSION: The patient lived with daugther but did not require assistance.     Physical Therapy Goals  Initiated 3/17/2024  Pt stated goal: \" To feel better\"  Pt will be I with LE HEP in 7 days.  Pt will perform bed mobility with Modified [place] in 7 days.  Pt will perform transfers with Modified [place] in 7 days.   Pt will amb 20 feet with LRAD safely with Modified [place] and Stand by Assist in 7 days.       Outcome: Progressing          PLAN :  Patient continues to benefit from skilled intervention to address functional impairments. Continue treatment per established plan of care to address goals.    Recommendation for discharge: (in order for the patient to meet his/her long term goals)  Inpatient 
(CERVIX STATUS UNKNOWN)      HYSTERECTOMY, VAGINAL      OTHER SURGICAL HISTORY      eye surgery for graves disease    TRACHEOSTOMY N/A 3/14/2024    AWAKE URGENT TRACHEOSTOMY AND BIOPSY (LOCAL WITH ZAIDA NIEVES) performed by Vaishali Pringle MD at [facility] MAIN OR     Prior Level of Function/Home Situation:   Social/Functional History  Lives With: Daughter  Type of Home: House  Home Layout: One level  Home Access: Stairs to enter with rails  Entrance Stairs - Number of Steps: 2  Entrance Stairs - Rails: None  Bathroom Toilet: Standard  Has the patient had two or more falls in the past year or any fall with injury in the past year?: No  ADL Assistance: Independent  Homemaking Assistance: Independent  Ambulation Assistance: Independent  Transfer Assistance: Independent  Occupation: Retired    Cognitive and Communication Status:  Neurologic State: Alert  Orientation Level: Oriented x4  Cognition: Follows commands    Baseline Assessment:  Current Diet : Minced and Moist  Current Liquid Diet : Thin     Hearing: wfl   General:         O2 Device: T-Piece (trach collar 21% 10L humidified air)     Current Diet : Minced and Moist  Current Liquid Diet : Thin  Dentition: Some missing teeth  Patient Positioning: Upright in bed      Voice:  Hoarse breathy w/ PMV      After Treatment:  Patient left in no apparent distress in bed, Call bell left within reach, Nursing notified, Caregiver present, and Updated patient's board with:  diet recommendations     Pain:  VAS (numerical) 0    COMMUNICATION/EDUCATION:   Swallow safety precautions, Aspiration precautions, Diet recommendations, Compensatory strategies, Prognosis and SLP POC, and PMV contraindications and benefits  provided to Patient, Family, and Nurse via explanation, demonstration, teach back, and visual, all questions/concerns addressed, verbalizes understanding, demonstrates understanding, and requires reinforcement.    The patient's plan of care including recommendations, planned 
Independent  Occupation: Retired    Cognitive and Communication Status:  Neurologic State: Alert  Orientation Level: Oriented x4  Cognition: Follows commands    Baseline Assessment:  Current Diet : Regular  Current Liquid Diet : Thin        Hearing: WFL     General:         O2 Device: T-Piece     Current Diet : Regular  Current Liquid Diet : Thin          After Treatment:  Patient left in no apparent distress in bed, Call bell left within reach, Nursing notified, Caregiver present, and Bed alarm activated     Pain:  VAS (numerical) 0    COMMUNICATION/EDUCATION:   Swallow safety precautions, Aspiration precautions, GERD precautions, Diet recommendations, MBS recommendations, Compensatory strategies, Prognosis and SLP POC, and nutritional intake  education provided to Patient, Family, and Nurse via handout provided , explanation, demonstration, teach back, and visual, all questions/concerns addressed. Patient verbalizes understanding and demonstrates understanding.     The patient's plan of care including recommendations, planned interventions, and recommended diet changes were discussed with: Registered nurse and Physician     Patient/family have participated as able in goal setting and plan of care and Patient/family agree to work toward stated goals and plan of care    Thank you,  Catrachita Desai M.S., M.Ed., CCC-SLP  Minutes: 53    
See evaluation for individualized goals.  Description: -Patient will tolerate PMV w/ free air movement to meet basic wants/needs.    -Patient will independently doff/rafael PMV.   3/22/2024 1549 by Catrachita Desai, SLP  Outcome: Progressing     
Services: Yes, patient will be followed by speech-language pathology 5x/week to address goals. Patient's rehabilitation potential is considered to be Good.    Discharge Recommendations: pt will need ongoing SLP follow-up at this time, will request MBS as indicated. Per MD, will hold PMV assessment pending trach change out in 5-7 days.        SUBJECTIVE:   Pt seen for bedside sw evaluation, alert, agreeable, appears anxious.     OBJECTIVE:     Past Medical History:   Diagnosis Date    Arthritis     Graves disease     Hypertension      Past Surgical History:   Procedure Laterality Date    BUNIONECTOMY      HYSTERECTOMY (CERVIX STATUS UNKNOWN)      HYSTERECTOMY, VAGINAL      OTHER SURGICAL HISTORY      eye surgery for graves disease     Prior Level of Function/Home Situation:   Social/Functional History  Lives With: Daughter, Family  Type of Home: House  ADL Assistance: Independent  Ambulation Assistance: Independent  Transfer Assistance: Independent  Occupation: Retired    Cognitive and Communication Status:  Neurologic State: Alert  Orientation Level: Unable to verbalize  Cognition: Follows commands and responds appropriately, appears overwhelmed by current situation    Hearing: appears intact, pt denies hearing loss    After Treatment:  Patient left in no apparent distress in bed, Call bell left within reach, Nursing notified, and Updated patient's board with:  diet recommendations     Pain:  VAS (numerical) 5/10    COMMUNICATION/EDUCATION:   Swallow safety precautions, Aspiration precautions, GERD precautions, Compensatory strategies, and Prognosis and SLP POC provided to Patient via explanation, all questions/concerns addressed, verbalizes understanding.    The patient's plan of care including recommendations, planned interventions, and recommended diet changes were discussed with: Registered nurse and Physician    Patient/family agree to work toward stated goals and plan of care    Thank you,  Catrachita Smith, 
[x] Straw   [] Syringe   [] Consecutive Swallows  [] Other:    Consistencies:   [x] Ba+ liquid   [] Ba+ liquid (nectar)   [] Ba+ liquid (honey)     [x] Ba+ pudding   [] Ba+ crunched cookie   [x] Ba+ cookie   [] Other:         Swallow Physiology:  Decreased Tongue Base Retraction?: Yes  Laryngeal Elevation: WFL (within functional limits)        Pharyngeal Dysfunction: None    Pharyngoesophageal Segment:       Pharyngeal Dysfunction:  Pharyngeal Dysfunction: None    Dysphagia Rating:    Oral phase: Oral Phase Severity: No impairment    Pharyngeal Phase: Pharyngeal Phase Severity: N/A    Upper Esophageal Screen:   Esophageal Screen: WFL       Penetration-Aspiration Scale (PAS)  Penetration-Aspiration Scale (PAS): 1 - Material does not enter the airway    COMMUNICATION/EDUCATION:   Swallow safety precautions, Aspiration precautions, GERD precautions, Diet recommendations, MBS recommendations, Compensatory strategies, Prognosis and SLP POC, and nutritional intake  education provided to Patient, Family, and Nurse via handout provided , explanation, demonstration, teach back, and visual, all questions/concerns addressed. Patient verbalizes understanding and demonstrates understanding.    The patient's plan of care including recommendations, planned interventions, and recommended diet changes were discussed with: Registered nurse and Physician    Patient/family have participated as able in goal setting and plan of care and Patient/family agree to work toward stated goals and plan of care    Thank you,  Catrachita Desai M.S., M.Ed., CCC-SLP  Minutes: 47      
airway pressure, respiratory therapy assess nares and determine need for appliance change or resting period.  Outcome: Progressing     Problem: Nutrition Deficit:  Goal: Optimize nutritional status  Outcome: Progressing     
Physician.    Patient/family have participated as able in goal setting and plan of care and Patient/family agree to work toward stated goals and plan of care    Thank you,  Catrachita Desai M.S., M.Ed., CCC-SLP  Minutes: 69

## 2024-04-09 ENCOUNTER — TELEPHONE (OUTPATIENT)
Age: 69
End: 2024-04-09

## 2024-04-09 NOTE — TELEPHONE ENCOUNTER
Pt is being discharged from hospital 4/11for trach and wont be able to make the appointment tomorrow where can we put her because she needs to be seen asap.

## 2024-04-16 ENCOUNTER — HOSPITAL ENCOUNTER (OUTPATIENT)
Facility: HOSPITAL | Age: 69
Discharge: HOME | End: 2024-04-19
Payer: MEDICARE

## 2024-04-16 VITALS
RESPIRATION RATE: 18 BRPM | OXYGEN SATURATION: 98 % | BODY MASS INDEX: 41.84 KG/M2 | HEART RATE: 66 BPM | WEIGHT: 236.2 LBS | SYSTOLIC BLOOD PRESSURE: 128 MMHG | DIASTOLIC BLOOD PRESSURE: 65 MMHG | TEMPERATURE: 98.4 F

## 2024-04-16 DIAGNOSIS — C32.9 LARYNX CANCER (HCC): Primary | ICD-10-CM

## 2024-04-16 PROCEDURE — 99213 OFFICE O/P EST LOW 20 MIN: CPT

## 2024-04-16 ASSESSMENT — PAIN DESCRIPTION - ORIENTATION: ORIENTATION: RIGHT;LEFT

## 2024-04-16 ASSESSMENT — PAIN DESCRIPTION - LOCATION: LOCATION: KNEE

## 2024-04-16 ASSESSMENT — PAIN SCALES - GENERAL: PAINLEVEL_OUTOF10: 6

## 2024-04-16 NOTE — PROGRESS NOTES
Lived in the Last Year: 1     Unstable Housing in the Last Year: No     Physical Exam:   Vitals: Blood pressure 128/65, pulse 66, temperature 98.4 °F (36.9 °C), temperature source Oral, resp. rate 18, weight 107.1 kg (236 lb 3.2 oz), SpO2 98 %.   Performance Status: ECOG 1, No physically strenuous activity, but ambulatory and able to carry out light or sedentary work (eg office work, light house work)  Constitutional: Pleasant, sitting comfortably in no acute distress.   HEENT: Moist mucous membranes.fiar-poor dentition lower and uppers. Oropharynx unremarkable. Indirect mirror not attempted. Trach in place, no adenopathy.  Cardiac: Good peripheral perfusion, no upper or lower extremity edema bilaterally.  Pulmonary: No increased work of breathing. Symmetric chest rise  Skin: Warm, dry, no rashes noted.  Neurologic: Alert and oriented.  Psychiatric: Cooperative to commands and responds to questions appropriately.    Imaging   Imaging reports were reviewed as detailed in her history above    Assessment & Plan   Ms. Mayo is a 68 y.o. female with a history of a SCC larynx. Final stage pending PET scan, but currently [id number].           In conclusion, after thorough discussion and shared decision making with the patient, we have formulated the following treatment plan:   - I agree that a course of concurrent chemo/xrt is indicated for Ms. Mayo.She will get her PET for final staging. I will coordinate my efforts with Dr. Mahajan's clinic.    She likely needs dental extractions, and we will wait till final dental work is completed before starting.    I discussed at length the risks and benefits of H+N radiotherapy. Ms. Mayo and her daughter seem to understand and agree to proceed.   Ms. Mayo has signed the appropriate consent forms.   I will have her return after PET is completed for simulation. Likely to be offered 6-7 weeks of daily radiotherapy with concurrent chemotherapy once planning is completed.

## 2024-04-17 ENCOUNTER — OFFICE VISIT (OUTPATIENT)
Age: 69
End: 2024-04-17

## 2024-04-17 VITALS — OXYGEN SATURATION: 97 % | SYSTOLIC BLOOD PRESSURE: 136 MMHG | HEART RATE: 75 BPM | DIASTOLIC BLOOD PRESSURE: 82 MMHG

## 2024-04-17 DIAGNOSIS — C32.9 LARYNGEAL CARCINOMA (HCC): ICD-10-CM

## 2024-04-17 DIAGNOSIS — R05.9 COUGH, UNSPECIFIED TYPE: ICD-10-CM

## 2024-04-17 DIAGNOSIS — J30.2 SEASONAL ALLERGIES: ICD-10-CM

## 2024-04-17 DIAGNOSIS — Z43.0 TRACHEOSTOMY CARE (HCC): Primary | ICD-10-CM

## 2024-04-17 RX ORDER — FEXOFENADINE HCL 180 MG/1
180 TABLET ORAL DAILY
Qty: 90 TABLET | Refills: 1 | Status: SHIPPED | OUTPATIENT
Start: 2024-04-17

## 2024-04-17 NOTE — PROGRESS NOTES
Subjective:   April Mayo   68 y.o.   1955     Refered by: No referring provider defined for this encounter.     Consult Note:   Chief Compliant: Dysphonia     History of Present Illness:  April Mayo is a 68 y.o. female with past medical history of arthritis, graves disease s/p ablation, HTN, who was referred from Dr. Darek Moscoso for evaluation of dysphonia.     Patient was diagnosed with COPD 6 months ago and was started on an inhaler.  Since then patient has noted worsening dysphonia.  Denies any dysphagia or stridor.  Endorses shortness of breath.  Patient is a former smoker, less than 1 pack a day, but has been smoking for a long time.  Recently quit.    Additionally patient is concerned about her obstructive sleep apnea.  Had a home sleep test done, but it was inconclusive.      Patient follows with Dr. Gomez for Graves' disease status post ablation.  TSH is still suppressed but T4 is within normal limits    Interval Hx:   3/14 -23/24:   Patient was admitted for awake trach and biopsy, pathology was consistent with squamous cell carcinoma    4/18/24:   Patient was discharged and doing well with trach management herself.  No airway obstructive issues.  Patient is complaining of significant coughing.  Minimal secretions with cough.  Patient has a known history of allergies, feels that the pollen may be contributing to her coughing.    Review of Systems  Consitutional: denies fever, excessive weight gain or loss.  Eyes: denies diplopia, eye pain.  Integumentary: denies new concerning skin lesions.  Ears, Nose, Mouth, Throat: denies except as per HPI.  Endocrine: denies hot or cold intolerance, increased thirst.  Respiratory: denies cough, hemoptysis, wheezing  Gastrointestinal: denies trouble swallowing, nausea, emesis, regurgitation  Musculoskeletal: denies muscle weakness or wasting  Cardiovascular: denies chest pain, shortness of breath  Neurologic: denies seizures, numbness or tingling,

## 2024-04-22 ENCOUNTER — HOSPITAL ENCOUNTER (OUTPATIENT)
Facility: HOSPITAL | Age: 69
Discharge: HOME | End: 2024-04-25
Attending: INTERNAL MEDICINE
Payer: MEDICARE

## 2024-04-22 DIAGNOSIS — C32.8 CANCER OF OVERLAPPING SITES OF LARYNX (HCC): ICD-10-CM

## 2024-04-22 PROCEDURE — A9609 HC RX DIAGNOSTIC RADIOPHARMACEUTICAL: HCPCS | Performed by: INTERNAL MEDICINE

## 2024-04-22 PROCEDURE — 3430000000 HC RX DIAGNOSTIC RADIOPHARMACEUTICAL: Performed by: INTERNAL MEDICINE

## 2024-04-22 PROCEDURE — 78815 PET IMAGE W/CT SKULL-THIGH: CPT

## 2024-04-22 RX ORDER — FLUDEOXYGLUCOSE F-18 500 MCI/ML
10 INJECTION INTRAVENOUS
Status: COMPLETED | OUTPATIENT
Start: 2024-04-22 | End: 2024-04-22

## 2024-04-22 RX ADMIN — FLUDEOXYGLUCOSE F-18 11.3 MILLICURIE: 500 INJECTION INTRAVENOUS at 13:24

## 2024-04-30 ENCOUNTER — HOSPITAL ENCOUNTER (OUTPATIENT)
Facility: HOSPITAL | Age: 69
Discharge: HOME | End: 2024-05-03
Attending: RADIOLOGY
Payer: MEDICARE

## 2024-04-30 ENCOUNTER — HOSPITAL ENCOUNTER (OUTPATIENT)
Facility: HOSPITAL | Age: 69
End: 2024-04-30
Attending: RADIOLOGY
Payer: MEDICARE

## 2024-04-30 VITALS
DIASTOLIC BLOOD PRESSURE: 65 MMHG | RESPIRATION RATE: 18 BRPM | HEART RATE: 63 BPM | BODY MASS INDEX: 41.81 KG/M2 | WEIGHT: 236 LBS | TEMPERATURE: 98.3 F | OXYGEN SATURATION: 99 % | SYSTOLIC BLOOD PRESSURE: 127 MMHG

## 2024-04-30 DIAGNOSIS — C32.8 CANCER OF OVERLAPPING SITES OF LARYNX (HCC): Primary | ICD-10-CM

## 2024-04-30 PROCEDURE — 99213 OFFICE O/P EST LOW 20 MIN: CPT

## 2024-04-30 ASSESSMENT — PAIN DESCRIPTION - LOCATION: LOCATION: KNEE

## 2024-04-30 ASSESSMENT — PAIN SCALES - GENERAL: PAINLEVEL_OUTOF10: 5

## 2024-04-30 ASSESSMENT — PAIN DESCRIPTION - ORIENTATION: ORIENTATION: RIGHT;LEFT

## 2024-04-30 NOTE — PROGRESS NOTES
[facility]    Radiation Oncology Follow Up    April Mayo  [id number]  1955     Diagnosis   Squamous cell ca supraglottic larynx. T3N0M0    AJCC Staging has been reviewed.  Oncologic History   Workup continues. No therapy started    Interval History   Ms. Mayo arrives today for routine follow up .    Ms. Mayo returns to review PET scan results.  PET scan negative for alberto disease or mets.    Scheduled for dental extractions tomorrow, port placement next week.    To see Dr. Mahajan later in May to initiate chemotherapy.    She is doing well. Tolerating trach placement. No new H+N symptoms.        Review of Systems:  A complete review of systems was obtained and negative except as described above.    Interval Imaging/Labs     Above imaging/lab reports were reviewed.  Allergies and Medications     Allergies   Allergen Reactions    Codeine Other (See Comments)     NAUSEA, VOMITING        Current Outpatient Medications   Medication Instructions    Benzocaine-Menthol (CEPACOL) 6-10 MG LOZG lozenge 1 lozenge, Oral, EVERY 2 HOURS PRN    budesonide-formoterol (SYMBICORT) 160-4.5 MCG/ACT AERO 2 puffs, Inhalation, 2 TIMES DAILY RESP    fexofenadine (ALLEGRA) 180 mg, Oral, DAILY    levocetirizine (XYZAL) 5 mg, Oral, DAILY    levothyroxine (SYNTHROID) 150 mcg, Oral, DAILY    losartan (COZAAR) 50 mg, Oral, DAILY        Physical Exam:   Vitals: Blood pressure 127/65, pulse 63, temperature 98.3 °F (36.8 °C), temperature source Oral, resp. rate 18, weight 107 kg (236 lb), SpO2 99 %.   Performance Status: ECOG 1, No physically strenuous activity, but ambulatory and able to carry out light or sedentary work (eg office work, light house work)  Constitutional: Pleasant, sitting comfortably in no acute distress.   HEENT: Moist mucous membranes. Trach in place,neck supple.   Cardiac: Good peripheral perfusion, no upper or lower extremity edema bilaterally.  Pulmonary: No increased

## 2024-05-03 ENCOUNTER — HOSPITAL ENCOUNTER (OUTPATIENT)
Facility: HOSPITAL | Age: 69
End: 2024-05-03
Attending: INTERNAL MEDICINE
Payer: MEDICARE

## 2024-05-03 VITALS
HEIGHT: 63 IN | DIASTOLIC BLOOD PRESSURE: 69 MMHG | HEART RATE: 60 BPM | WEIGHT: 236 LBS | RESPIRATION RATE: 18 BRPM | BODY MASS INDEX: 41.82 KG/M2 | OXYGEN SATURATION: 97 % | TEMPERATURE: 98.1 F | SYSTOLIC BLOOD PRESSURE: 145 MMHG

## 2024-05-03 DIAGNOSIS — C32.8 MALIGNANT NEOPLASM OVERLAPPING LARYNX SITE (HCC): ICD-10-CM

## 2024-05-03 LAB
ANION GAP SERPL CALC-SCNC: 7 MMOL/L (ref 5–15)
BUN SERPL-MCNC: 6 MG/DL (ref 6–20)
BUN/CREAT SERPL: 7 (ref 12–20)
CA-I BLD-MCNC: 9.5 MG/DL (ref 8.5–10.1)
CHLORIDE SERPL-SCNC: 111 MMOL/L (ref 97–108)
CO2 SERPL-SCNC: 23 MMOL/L (ref 21–32)
CREAT SERPL-MCNC: 0.9 MG/DL (ref 0.55–1.02)
ERYTHROCYTE [DISTWIDTH] IN BLOOD BY AUTOMATED COUNT: 13.2 % (ref 11.5–14.5)
GLUCOSE SERPL-MCNC: 100 MG/DL (ref 65–100)
HCT VFR BLD AUTO: 38.4 % (ref 35–47)
HGB BLD-MCNC: 12.4 G/DL (ref 11.5–16)
INR PPP: 1 (ref 0.9–1.1)
MCH RBC QN AUTO: 29.7 PG (ref 26–34)
MCHC RBC AUTO-ENTMCNC: 32.3 G/DL (ref 30–36.5)
MCV RBC AUTO: 91.9 FL (ref 80–99)
NRBC # BLD: 0 K/UL (ref 0–0.01)
NRBC BLD-RTO: 0 PER 100 WBC
PLATELET # BLD AUTO: 287 K/UL (ref 150–400)
PMV BLD AUTO: 9.8 FL (ref 8.9–12.9)
POTASSIUM SERPL-SCNC: 4.1 MMOL/L (ref 3.5–5.1)
PROTHROMBIN TIME: 14 SEC (ref 11.9–14.6)
RBC # BLD AUTO: 4.18 M/UL (ref 3.8–5.2)
SODIUM SERPL-SCNC: 141 MMOL/L (ref 136–145)
WBC # BLD AUTO: 6.4 K/UL (ref 3.6–11)

## 2024-05-03 PROCEDURE — 7100000011 HC PHASE II RECOVERY - ADDTL 15 MIN

## 2024-05-03 PROCEDURE — 77001 FLUOROGUIDE FOR VEIN DEVICE: CPT

## 2024-05-03 PROCEDURE — 6360000002 HC RX W HCPCS: Performed by: STUDENT IN AN ORGANIZED HEALTH CARE EDUCATION/TRAINING PROGRAM

## 2024-05-03 PROCEDURE — 2580000003 HC RX 258: Performed by: INTERNAL MEDICINE

## 2024-05-03 PROCEDURE — 7100000010 HC PHASE II RECOVERY - FIRST 15 MIN

## 2024-05-03 PROCEDURE — 85027 COMPLETE CBC AUTOMATED: CPT

## 2024-05-03 PROCEDURE — 36415 COLL VENOUS BLD VENIPUNCTURE: CPT

## 2024-05-03 PROCEDURE — 85610 PROTHROMBIN TIME: CPT

## 2024-05-03 PROCEDURE — 99156 MOD SED OTH PHYS/QHP 5/>YRS: CPT

## 2024-05-03 PROCEDURE — 80048 BASIC METABOLIC PNL TOTAL CA: CPT

## 2024-05-03 PROCEDURE — 99152 MOD SED SAME PHYS/QHP 5/>YRS: CPT

## 2024-05-03 RX ORDER — AMOXICILLIN 500 MG/1
500 CAPSULE ORAL 3 TIMES DAILY
COMMUNITY

## 2024-05-03 RX ORDER — ALBUTEROL SULFATE 2.5 MG/3ML
2.5 SOLUTION RESPIRATORY (INHALATION) EVERY 6 HOURS PRN
COMMUNITY

## 2024-05-03 RX ORDER — ALBUTEROL SULFATE 90 UG/1
2 AEROSOL, METERED RESPIRATORY (INHALATION) EVERY 6 HOURS PRN
COMMUNITY

## 2024-05-03 RX ORDER — MIDAZOLAM HYDROCHLORIDE 2 MG/2ML
INJECTION, SOLUTION INTRAMUSCULAR; INTRAVENOUS PRN
Status: COMPLETED | OUTPATIENT
Start: 2024-05-03 | End: 2024-05-03

## 2024-05-03 RX ORDER — SODIUM CHLORIDE 9 MG/ML
INJECTION, SOLUTION INTRAVENOUS CONTINUOUS
Status: DISCONTINUED | OUTPATIENT
Start: 2024-05-03 | End: 2024-05-07 | Stop reason: HOSPADM

## 2024-05-03 RX ORDER — FENTANYL CITRATE 50 UG/ML
INJECTION, SOLUTION INTRAMUSCULAR; INTRAVENOUS PRN
Status: COMPLETED | OUTPATIENT
Start: 2024-05-03 | End: 2024-05-03

## 2024-05-03 RX ADMIN — MIDAZOLAM HYDROCHLORIDE 0.5 MG: 1 INJECTION, SOLUTION INTRAMUSCULAR; INTRAVENOUS at 11:54

## 2024-05-03 RX ADMIN — FENTANYL CITRATE 25 MCG: 50 INJECTION, SOLUTION INTRAMUSCULAR; INTRAVENOUS at 11:46

## 2024-05-03 RX ADMIN — FENTANYL CITRATE 25 MCG: 50 INJECTION, SOLUTION INTRAMUSCULAR; INTRAVENOUS at 11:54

## 2024-05-03 RX ADMIN — FENTANYL CITRATE 50 MCG: 50 INJECTION, SOLUTION INTRAMUSCULAR; INTRAVENOUS at 11:40

## 2024-05-03 RX ADMIN — MIDAZOLAM HYDROCHLORIDE 0.5 MG: 1 INJECTION, SOLUTION INTRAMUSCULAR; INTRAVENOUS at 11:46

## 2024-05-03 RX ADMIN — MIDAZOLAM HYDROCHLORIDE 1 MG: 1 INJECTION, SOLUTION INTRAMUSCULAR; INTRAVENOUS at 11:40

## 2024-05-03 RX ADMIN — SODIUM CHLORIDE: 9 INJECTION, SOLUTION INTRAVENOUS at 09:50

## 2024-05-03 ASSESSMENT — PAIN - FUNCTIONAL ASSESSMENT
PAIN_FUNCTIONAL_ASSESSMENT: 0-10
PAIN_FUNCTIONAL_ASSESSMENT: NONE - DENIES PAIN

## 2024-05-03 NOTE — PERIOP NOTE
Patient alert and oriented x4, VS stable, no complaints of pain at this time. Discharge instructions/education provided to daughter, Marlin, she verbalized understanding and had no questions. Patient discharged in wheelchair to home with daughter at main entrance of hospital via private vehicle.

## 2024-05-03 NOTE — H&P
INTERVENTIONAL RADIOLOGY  Preoperative History and Physical      Patient:  April Mayo  :  1955  Age:  68 y.o.  MRN:  [id number]  Today's Date:  5/3/2024      CC / HPI   April Mayo is a 68 y.o. female with a history of Malignant neoplasm overlapping larynx site  who presents for portacath placement for chemotherapy.    PAST MEDICAL HISTORY  Past Medical History:   Diagnosis Date    Arthritis     Graves disease     Hypertension        PAST SURGICAL HISTORY  Past Surgical History:   Procedure Laterality Date    BUNIONECTOMY      HYSTERECTOMY (CERVIX STATUS UNKNOWN)      HYSTERECTOMY, VAGINAL      OTHER SURGICAL HISTORY      eye surgery for graves disease    TRACHEOSTOMY N/A 3/14/2024    AWAKE URGENT TRACHEOSTOMY AND BIOPSY (LOCAL WITH ZAIDA NIEVES) performed by Vaishali Pringle MD at [facility] MAIN OR       SOCIAL HISTORY  Social History     Socioeconomic History    Marital status:      Spouse name: Not on file    Number of children: Not on file    Years of education: Not on file    Highest education level: Not on file   Occupational History    Not on file   Tobacco Use    Smoking status: Former     Current packs/day: 0.00     Types: Cigarettes     Quit date: 2023     Years since quittin.6    Smokeless tobacco: Never   Substance and Sexual Activity    Alcohol use: Never    Drug use: Never    Sexual activity: Not Currently     Partners: Male   Other Topics Concern    Not on file   Social History Narrative    Not on file     Social Determinants of Health     Financial Resource Strain: Not on file   Food Insecurity: No Food Insecurity (3/14/2024)    Hunger Vital Sign     Worried About Running Out of Food in the Last Year: Never true     Ran Out of Food in the Last Year: Never true   Transportation Needs: No Transportation Needs (3/14/2024)    PRAPARE - Transportation     Lack of Transportation (Medical): No     Lack of Transportation (Non-Medical): No   Physical Activity: Not on file   Stress: Not  05/03/2024 09:41 AM    HGB 12.4 05/03/2024 09:41 AM    HCT 38.4 05/03/2024 09:41 AM     05/03/2024 09:41 AM    MCV 91.9 05/03/2024 09:41 AM     Lab Results   Component Value Date/Time     05/03/2024 09:41 AM    K 4.1 05/03/2024 09:41 AM     05/03/2024 09:41 AM    CO2 23 05/03/2024 09:41 AM    BUN 6 05/03/2024 09:41 AM     Lab Results   Component Value Date/Time    INR 1.0 05/03/2024 09:41 AM       PHYSICAL EXAM     Vitals:    05/03/24 0932   BP: (!) 155/68   Pulse: 72   Resp: 20   Temp: 99 °F (37.2 °C)   SpO2: 96%        General:  NAD  Heart:  RRR  Lungs:  NWOB  Neurological:  AAOX3    PLAN   Procedure to be performed:  US and fluoroscopically guided portacath placement    Plan for sedation:  moderate  Mallampati Airway Assessment:  III (soft palate, base of uvula visible)  ASA Classification:  ASA 3 - Patient with moderate systemic disease with functional limitations  Post procedure plan:  observation per protocol  Informed consent:  indication, risks and alternatives of the planned procedure and sedation methods reviewed with the patient / family who agree to proceed  Code status:  Full      JOEL Dyer  [facility]

## 2024-05-07 ENCOUNTER — HOSPITAL ENCOUNTER (OUTPATIENT)
Facility: HOSPITAL | Age: 69
Discharge: HOME | End: 2024-05-10
Attending: RADIOLOGY
Payer: MEDICARE

## 2024-05-07 ENCOUNTER — APPOINTMENT (OUTPATIENT)
Facility: HOSPITAL | Age: 69
End: 2024-05-07
Attending: RADIOLOGY
Payer: MEDICARE

## 2024-05-07 PROCEDURE — 77334 RADIATION TREATMENT AID(S): CPT

## 2024-05-07 PROCEDURE — 77290 THER RAD SIMULAJ FIELD CPLX: CPT

## 2024-05-13 ENCOUNTER — HOSPITAL ENCOUNTER (OUTPATIENT)
Facility: HOSPITAL | Age: 69
Discharge: HOME | End: 2024-05-16
Attending: RADIOLOGY
Payer: MEDICARE

## 2024-05-13 PROCEDURE — 77338 DESIGN MLC DEVICE FOR IMRT: CPT

## 2024-05-13 PROCEDURE — 77301 RADIOTHERAPY DOSE PLAN IMRT: CPT

## 2024-05-13 PROCEDURE — 77470 SPECIAL RADIATION TREATMENT: CPT

## 2024-05-13 PROCEDURE — 77300 RADIATION THERAPY DOSE PLAN: CPT

## 2024-05-14 ENCOUNTER — APPOINTMENT (OUTPATIENT)
Facility: HOSPITAL | Age: 69
End: 2024-05-14
Attending: RADIOLOGY
Payer: MEDICARE

## 2024-05-28 ENCOUNTER — APPOINTMENT (OUTPATIENT)
Facility: HOSPITAL | Age: 69
End: 2024-05-28
Attending: RADIOLOGY
Payer: MEDICARE

## 2024-05-29 ENCOUNTER — HOSPITAL ENCOUNTER (OUTPATIENT)
Facility: HOSPITAL | Age: 69
Discharge: HOME | End: 2024-06-01
Attending: RADIOLOGY
Payer: MEDICARE

## 2024-05-29 LAB
RAD ONC ARIA COURSE FIRST TREATMENT DATE: NORMAL
RAD ONC ARIA COURSE ID: NORMAL
RAD ONC ARIA COURSE INTENT: NORMAL
RAD ONC ARIA COURSE LAST TREATMENT DATE: NORMAL
RAD ONC ARIA COURSE SESSION NUMBER: 1
RAD ONC ARIA COURSE START DATE: NORMAL
RAD ONC ARIA COURSE TREATMENT ELAPSED DAYS: 0
RAD ONC ARIA PLAN FRACTIONS TREATED TO DATE: 1
RAD ONC ARIA PLAN ID: NORMAL
RAD ONC ARIA PLAN PRESCRIBED DOSE PER FRACTION: 2 GY
RAD ONC ARIA PLAN PRIMARY REFERENCE POINT: NORMAL
RAD ONC ARIA PLAN TOTAL FRACTIONS PRESCRIBED: 35
RAD ONC ARIA PLAN TOTAL PRESCRIBED DOSE: 7000 CGY
RAD ONC ARIA REFERENCE POINT DOSAGE GIVEN TO DATE: 1.26 GY
RAD ONC ARIA REFERENCE POINT DOSAGE GIVEN TO DATE: 2 GY
RAD ONC ARIA REFERENCE POINT ID: NORMAL
RAD ONC ARIA REFERENCE POINT ID: NORMAL
RAD ONC ARIA REFERENCE POINT SESSION DOSAGE GIVEN: 1.26 GY
RAD ONC ARIA REFERENCE POINT SESSION DOSAGE GIVEN: 2 GY

## 2024-05-29 PROCEDURE — 77385 HC NTSTY MODUL RAD TX DLVR SMPL: CPT

## 2024-05-30 ENCOUNTER — HOSPITAL ENCOUNTER (OUTPATIENT)
Facility: HOSPITAL | Age: 69
End: 2024-05-30
Attending: RADIOLOGY
Payer: MEDICARE

## 2024-05-30 LAB
RAD ONC ARIA COURSE FIRST TREATMENT DATE: NORMAL
RAD ONC ARIA COURSE ID: NORMAL
RAD ONC ARIA COURSE INTENT: NORMAL
RAD ONC ARIA COURSE LAST TREATMENT DATE: NORMAL
RAD ONC ARIA COURSE SESSION NUMBER: 2
RAD ONC ARIA COURSE START DATE: NORMAL
RAD ONC ARIA COURSE TREATMENT ELAPSED DAYS: 1
RAD ONC ARIA PLAN FRACTIONS TREATED TO DATE: 2
RAD ONC ARIA PLAN ID: NORMAL
RAD ONC ARIA PLAN PRESCRIBED DOSE PER FRACTION: 2 GY
RAD ONC ARIA PLAN PRIMARY REFERENCE POINT: NORMAL
RAD ONC ARIA PLAN TOTAL FRACTIONS PRESCRIBED: 35
RAD ONC ARIA PLAN TOTAL PRESCRIBED DOSE: 7000 CGY
RAD ONC ARIA REFERENCE POINT DOSAGE GIVEN TO DATE: 2.51 GY
RAD ONC ARIA REFERENCE POINT DOSAGE GIVEN TO DATE: 4 GY
RAD ONC ARIA REFERENCE POINT ID: NORMAL
RAD ONC ARIA REFERENCE POINT ID: NORMAL
RAD ONC ARIA REFERENCE POINT SESSION DOSAGE GIVEN: 1.26 GY
RAD ONC ARIA REFERENCE POINT SESSION DOSAGE GIVEN: 2 GY

## 2024-05-30 PROCEDURE — 77386 HC NTSTY MODUL RAD TX DLVR CPLX: CPT

## 2024-05-31 ENCOUNTER — HOSPITAL ENCOUNTER (OUTPATIENT)
Facility: HOSPITAL | Age: 69
End: 2024-05-31
Attending: RADIOLOGY
Payer: MEDICARE

## 2024-05-31 LAB
RAD ONC ARIA COURSE FIRST TREATMENT DATE: NORMAL
RAD ONC ARIA COURSE ID: NORMAL
RAD ONC ARIA COURSE INTENT: NORMAL
RAD ONC ARIA COURSE LAST TREATMENT DATE: NORMAL
RAD ONC ARIA COURSE SESSION NUMBER: 3
RAD ONC ARIA COURSE START DATE: NORMAL
RAD ONC ARIA COURSE TREATMENT ELAPSED DAYS: 2
RAD ONC ARIA PLAN FRACTIONS TREATED TO DATE: 1
RAD ONC ARIA PLAN ID: NORMAL
RAD ONC ARIA PLAN PRESCRIBED DOSE PER FRACTION: 2 GY
RAD ONC ARIA PLAN PRIMARY REFERENCE POINT: NORMAL
RAD ONC ARIA PLAN TOTAL FRACTIONS PRESCRIBED: 35
RAD ONC ARIA PLAN TOTAL PRESCRIBED DOSE: 7000 CGY
RAD ONC ARIA REFERENCE POINT DOSAGE GIVEN TO DATE: 3.84 GY
RAD ONC ARIA REFERENCE POINT DOSAGE GIVEN TO DATE: 6 GY
RAD ONC ARIA REFERENCE POINT ID: NORMAL
RAD ONC ARIA REFERENCE POINT ID: NORMAL
RAD ONC ARIA REFERENCE POINT SESSION DOSAGE GIVEN: 1.33 GY
RAD ONC ARIA REFERENCE POINT SESSION DOSAGE GIVEN: 2 GY

## 2024-05-31 PROCEDURE — 77386 HC NTSTY MODUL RAD TX DLVR CPLX: CPT

## 2024-06-03 ENCOUNTER — HOSPITAL ENCOUNTER (OUTPATIENT)
Facility: HOSPITAL | Age: 69
Discharge: HOME | End: 2024-06-06
Attending: RADIOLOGY
Payer: MEDICARE

## 2024-06-03 LAB
RAD ONC ARIA COURSE FIRST TREATMENT DATE: NORMAL
RAD ONC ARIA COURSE ID: NORMAL
RAD ONC ARIA COURSE INTENT: NORMAL
RAD ONC ARIA COURSE LAST TREATMENT DATE: NORMAL
RAD ONC ARIA COURSE SESSION NUMBER: 4
RAD ONC ARIA COURSE START DATE: NORMAL
RAD ONC ARIA COURSE TREATMENT ELAPSED DAYS: 5
RAD ONC ARIA PLAN FRACTIONS TREATED TO DATE: 3
RAD ONC ARIA PLAN ID: NORMAL
RAD ONC ARIA PLAN PRESCRIBED DOSE PER FRACTION: 2 GY
RAD ONC ARIA PLAN PRIMARY REFERENCE POINT: NORMAL
RAD ONC ARIA PLAN TOTAL FRACTIONS PRESCRIBED: 35
RAD ONC ARIA PLAN TOTAL PRESCRIBED DOSE: 7000 CGY
RAD ONC ARIA REFERENCE POINT DOSAGE GIVEN TO DATE: 5.1 GY
RAD ONC ARIA REFERENCE POINT DOSAGE GIVEN TO DATE: 8 GY
RAD ONC ARIA REFERENCE POINT ID: NORMAL
RAD ONC ARIA REFERENCE POINT ID: NORMAL
RAD ONC ARIA REFERENCE POINT SESSION DOSAGE GIVEN: 1.26 GY
RAD ONC ARIA REFERENCE POINT SESSION DOSAGE GIVEN: 2 GY

## 2024-06-03 PROCEDURE — 77386 HC NTSTY MODUL RAD TX DLVR CPLX: CPT

## 2024-06-03 ASSESSMENT — PATIENT HEALTH QUESTIONNAIRE - PHQ9
SUM OF ALL RESPONSES TO PHQ QUESTIONS 1-9: 1
2. FEELING DOWN, DEPRESSED OR HOPELESS: SEVERAL DAYS
SUM OF ALL RESPONSES TO PHQ9 QUESTIONS 1 & 2: 1
1. LITTLE INTEREST OR PLEASURE IN DOING THINGS: NOT AT ALL

## 2024-06-04 ENCOUNTER — HOSPITAL ENCOUNTER (OUTPATIENT)
Facility: HOSPITAL | Age: 69
Discharge: HOME | End: 2024-06-07
Attending: RADIOLOGY
Payer: MEDICARE

## 2024-06-04 LAB
RAD ONC ARIA COURSE FIRST TREATMENT DATE: NORMAL
RAD ONC ARIA COURSE ID: NORMAL
RAD ONC ARIA COURSE INTENT: NORMAL
RAD ONC ARIA COURSE LAST TREATMENT DATE: NORMAL
RAD ONC ARIA COURSE SESSION NUMBER: 5
RAD ONC ARIA COURSE START DATE: NORMAL
RAD ONC ARIA COURSE TREATMENT ELAPSED DAYS: 6
RAD ONC ARIA PLAN FRACTIONS TREATED TO DATE: 4
RAD ONC ARIA PLAN ID: NORMAL
RAD ONC ARIA PLAN PRESCRIBED DOSE PER FRACTION: 2 GY
RAD ONC ARIA PLAN PRIMARY REFERENCE POINT: NORMAL
RAD ONC ARIA PLAN TOTAL FRACTIONS PRESCRIBED: 35
RAD ONC ARIA PLAN TOTAL PRESCRIBED DOSE: 7000 CGY
RAD ONC ARIA REFERENCE POINT DOSAGE GIVEN TO DATE: 10 GY
RAD ONC ARIA REFERENCE POINT DOSAGE GIVEN TO DATE: 6.36 GY
RAD ONC ARIA REFERENCE POINT ID: NORMAL
RAD ONC ARIA REFERENCE POINT ID: NORMAL
RAD ONC ARIA REFERENCE POINT SESSION DOSAGE GIVEN: 1.26 GY
RAD ONC ARIA REFERENCE POINT SESSION DOSAGE GIVEN: 2 GY

## 2024-06-04 PROCEDURE — 77386 HC NTSTY MODUL RAD TX DLVR CPLX: CPT

## 2024-06-04 PROCEDURE — 77336 RADIATION PHYSICS CONSULT: CPT

## 2024-06-05 ENCOUNTER — HOSPITAL ENCOUNTER (OUTPATIENT)
Facility: HOSPITAL | Age: 69
Discharge: HOME | End: 2024-06-08
Attending: RADIOLOGY
Payer: MEDICARE

## 2024-06-05 LAB
RAD ONC ARIA COURSE FIRST TREATMENT DATE: NORMAL
RAD ONC ARIA COURSE ID: NORMAL
RAD ONC ARIA COURSE INTENT: NORMAL
RAD ONC ARIA COURSE LAST TREATMENT DATE: NORMAL
RAD ONC ARIA COURSE SESSION NUMBER: 6
RAD ONC ARIA COURSE START DATE: NORMAL
RAD ONC ARIA COURSE TREATMENT ELAPSED DAYS: 7
RAD ONC ARIA PLAN FRACTIONS TREATED TO DATE: 5
RAD ONC ARIA PLAN ID: NORMAL
RAD ONC ARIA PLAN PRESCRIBED DOSE PER FRACTION: 2 GY
RAD ONC ARIA PLAN PRIMARY REFERENCE POINT: NORMAL
RAD ONC ARIA PLAN TOTAL FRACTIONS PRESCRIBED: 35
RAD ONC ARIA PLAN TOTAL PRESCRIBED DOSE: 7000 CGY
RAD ONC ARIA REFERENCE POINT DOSAGE GIVEN TO DATE: 12 GY
RAD ONC ARIA REFERENCE POINT DOSAGE GIVEN TO DATE: 7.62 GY
RAD ONC ARIA REFERENCE POINT ID: NORMAL
RAD ONC ARIA REFERENCE POINT ID: NORMAL
RAD ONC ARIA REFERENCE POINT SESSION DOSAGE GIVEN: 1.26 GY
RAD ONC ARIA REFERENCE POINT SESSION DOSAGE GIVEN: 2 GY

## 2024-06-05 PROCEDURE — 77386 HC NTSTY MODUL RAD TX DLVR CPLX: CPT

## 2024-06-06 ENCOUNTER — HOSPITAL ENCOUNTER (OUTPATIENT)
Facility: HOSPITAL | Age: 69
Discharge: HOME | End: 2024-06-09
Attending: RADIOLOGY
Payer: MEDICARE

## 2024-06-06 LAB
RAD ONC ARIA COURSE FIRST TREATMENT DATE: NORMAL
RAD ONC ARIA COURSE ID: NORMAL
RAD ONC ARIA COURSE INTENT: NORMAL
RAD ONC ARIA COURSE LAST TREATMENT DATE: NORMAL
RAD ONC ARIA COURSE SESSION NUMBER: 7
RAD ONC ARIA COURSE START DATE: NORMAL
RAD ONC ARIA COURSE TREATMENT ELAPSED DAYS: 8
RAD ONC ARIA PLAN FRACTIONS TREATED TO DATE: 6
RAD ONC ARIA PLAN ID: NORMAL
RAD ONC ARIA PLAN PRESCRIBED DOSE PER FRACTION: 2 GY
RAD ONC ARIA PLAN PRIMARY REFERENCE POINT: NORMAL
RAD ONC ARIA PLAN TOTAL FRACTIONS PRESCRIBED: 35
RAD ONC ARIA PLAN TOTAL PRESCRIBED DOSE: 7000 CGY
RAD ONC ARIA REFERENCE POINT DOSAGE GIVEN TO DATE: 14 GY
RAD ONC ARIA REFERENCE POINT DOSAGE GIVEN TO DATE: 8.87 GY
RAD ONC ARIA REFERENCE POINT ID: NORMAL
RAD ONC ARIA REFERENCE POINT ID: NORMAL
RAD ONC ARIA REFERENCE POINT SESSION DOSAGE GIVEN: 1.26 GY
RAD ONC ARIA REFERENCE POINT SESSION DOSAGE GIVEN: 2 GY

## 2024-06-06 PROCEDURE — 77386 HC NTSTY MODUL RAD TX DLVR CPLX: CPT

## 2024-06-07 ENCOUNTER — HOSPITAL ENCOUNTER (OUTPATIENT)
Facility: HOSPITAL | Age: 69
Discharge: HOME | End: 2024-06-10
Attending: RADIOLOGY
Payer: MEDICARE

## 2024-06-07 LAB
RAD ONC ARIA COURSE FIRST TREATMENT DATE: NORMAL
RAD ONC ARIA COURSE ID: NORMAL
RAD ONC ARIA COURSE INTENT: NORMAL
RAD ONC ARIA COURSE LAST TREATMENT DATE: NORMAL
RAD ONC ARIA COURSE SESSION NUMBER: 8
RAD ONC ARIA COURSE START DATE: NORMAL
RAD ONC ARIA COURSE TREATMENT ELAPSED DAYS: 9
RAD ONC ARIA PLAN FRACTIONS TREATED TO DATE: 7
RAD ONC ARIA PLAN ID: NORMAL
RAD ONC ARIA PLAN PRESCRIBED DOSE PER FRACTION: 2 GY
RAD ONC ARIA PLAN PRIMARY REFERENCE POINT: NORMAL
RAD ONC ARIA PLAN TOTAL FRACTIONS PRESCRIBED: 35
RAD ONC ARIA PLAN TOTAL PRESCRIBED DOSE: 7000 CGY
RAD ONC ARIA REFERENCE POINT DOSAGE GIVEN TO DATE: 10.13 GY
RAD ONC ARIA REFERENCE POINT DOSAGE GIVEN TO DATE: 16 GY
RAD ONC ARIA REFERENCE POINT ID: NORMAL
RAD ONC ARIA REFERENCE POINT ID: NORMAL
RAD ONC ARIA REFERENCE POINT SESSION DOSAGE GIVEN: 1.26 GY
RAD ONC ARIA REFERENCE POINT SESSION DOSAGE GIVEN: 2 GY

## 2024-06-07 PROCEDURE — 77386 HC NTSTY MODUL RAD TX DLVR CPLX: CPT

## 2024-06-10 ENCOUNTER — HOSPITAL ENCOUNTER (OUTPATIENT)
Facility: HOSPITAL | Age: 69
Discharge: HOME | End: 2024-06-13
Attending: RADIOLOGY
Payer: MEDICARE

## 2024-06-10 LAB
RAD ONC ARIA COURSE FIRST TREATMENT DATE: NORMAL
RAD ONC ARIA COURSE ID: NORMAL
RAD ONC ARIA COURSE INTENT: NORMAL
RAD ONC ARIA COURSE LAST TREATMENT DATE: NORMAL
RAD ONC ARIA COURSE SESSION NUMBER: 9
RAD ONC ARIA COURSE START DATE: NORMAL
RAD ONC ARIA COURSE TREATMENT ELAPSED DAYS: 12
RAD ONC ARIA PLAN FRACTIONS TREATED TO DATE: 8
RAD ONC ARIA PLAN ID: NORMAL
RAD ONC ARIA PLAN PRESCRIBED DOSE PER FRACTION: 2 GY
RAD ONC ARIA PLAN PRIMARY REFERENCE POINT: NORMAL
RAD ONC ARIA PLAN TOTAL FRACTIONS PRESCRIBED: 35
RAD ONC ARIA PLAN TOTAL PRESCRIBED DOSE: 7000 CGY
RAD ONC ARIA REFERENCE POINT DOSAGE GIVEN TO DATE: 11.39 GY
RAD ONC ARIA REFERENCE POINT DOSAGE GIVEN TO DATE: 18 GY
RAD ONC ARIA REFERENCE POINT ID: NORMAL
RAD ONC ARIA REFERENCE POINT ID: NORMAL
RAD ONC ARIA REFERENCE POINT SESSION DOSAGE GIVEN: 1.26 GY
RAD ONC ARIA REFERENCE POINT SESSION DOSAGE GIVEN: 2 GY

## 2024-06-10 PROCEDURE — 77386 HC NTSTY MODUL RAD TX DLVR CPLX: CPT

## 2024-06-11 ENCOUNTER — HOSPITAL ENCOUNTER (OUTPATIENT)
Facility: HOSPITAL | Age: 69
Discharge: HOME | End: 2024-06-14
Attending: RADIOLOGY
Payer: MEDICARE

## 2024-06-11 LAB
RAD ONC ARIA COURSE FIRST TREATMENT DATE: NORMAL
RAD ONC ARIA COURSE ID: NORMAL
RAD ONC ARIA COURSE INTENT: NORMAL
RAD ONC ARIA COURSE LAST TREATMENT DATE: NORMAL
RAD ONC ARIA COURSE SESSION NUMBER: 10
RAD ONC ARIA COURSE START DATE: NORMAL
RAD ONC ARIA COURSE TREATMENT ELAPSED DAYS: 13
RAD ONC ARIA PLAN FRACTIONS TREATED TO DATE: 9
RAD ONC ARIA PLAN ID: NORMAL
RAD ONC ARIA PLAN PRESCRIBED DOSE PER FRACTION: 2 GY
RAD ONC ARIA PLAN PRIMARY REFERENCE POINT: NORMAL
RAD ONC ARIA PLAN TOTAL FRACTIONS PRESCRIBED: 35
RAD ONC ARIA PLAN TOTAL PRESCRIBED DOSE: 7000 CGY
RAD ONC ARIA REFERENCE POINT DOSAGE GIVEN TO DATE: 12.64 GY
RAD ONC ARIA REFERENCE POINT DOSAGE GIVEN TO DATE: 20 GY
RAD ONC ARIA REFERENCE POINT ID: NORMAL
RAD ONC ARIA REFERENCE POINT ID: NORMAL
RAD ONC ARIA REFERENCE POINT SESSION DOSAGE GIVEN: 1.26 GY
RAD ONC ARIA REFERENCE POINT SESSION DOSAGE GIVEN: 2 GY

## 2024-06-11 PROCEDURE — 77336 RADIATION PHYSICS CONSULT: CPT

## 2024-06-11 PROCEDURE — 77386 HC NTSTY MODUL RAD TX DLVR CPLX: CPT

## 2024-06-12 ENCOUNTER — HOSPITAL ENCOUNTER (OUTPATIENT)
Facility: HOSPITAL | Age: 69
Discharge: HOME | End: 2024-06-15
Attending: RADIOLOGY
Payer: MEDICARE

## 2024-06-12 LAB
RAD ONC ARIA COURSE FIRST TREATMENT DATE: NORMAL
RAD ONC ARIA COURSE ID: NORMAL
RAD ONC ARIA COURSE INTENT: NORMAL
RAD ONC ARIA COURSE LAST TREATMENT DATE: NORMAL
RAD ONC ARIA COURSE SESSION NUMBER: 11
RAD ONC ARIA COURSE START DATE: NORMAL
RAD ONC ARIA COURSE TREATMENT ELAPSED DAYS: 14
RAD ONC ARIA PLAN FRACTIONS TREATED TO DATE: 10
RAD ONC ARIA PLAN ID: NORMAL
RAD ONC ARIA PLAN PRESCRIBED DOSE PER FRACTION: 2 GY
RAD ONC ARIA PLAN PRIMARY REFERENCE POINT: NORMAL
RAD ONC ARIA PLAN TOTAL FRACTIONS PRESCRIBED: 35
RAD ONC ARIA PLAN TOTAL PRESCRIBED DOSE: 7000 CGY
RAD ONC ARIA REFERENCE POINT DOSAGE GIVEN TO DATE: 13.9 GY
RAD ONC ARIA REFERENCE POINT DOSAGE GIVEN TO DATE: 22 GY
RAD ONC ARIA REFERENCE POINT ID: NORMAL
RAD ONC ARIA REFERENCE POINT ID: NORMAL
RAD ONC ARIA REFERENCE POINT SESSION DOSAGE GIVEN: 1.26 GY
RAD ONC ARIA REFERENCE POINT SESSION DOSAGE GIVEN: 2 GY

## 2024-06-12 PROCEDURE — 77386 HC NTSTY MODUL RAD TX DLVR CPLX: CPT

## 2024-06-13 ENCOUNTER — HOSPITAL ENCOUNTER (OUTPATIENT)
Facility: HOSPITAL | Age: 69
Discharge: HOME | End: 2024-06-16
Attending: RADIOLOGY
Payer: MEDICARE

## 2024-06-13 LAB
RAD ONC ARIA COURSE FIRST TREATMENT DATE: NORMAL
RAD ONC ARIA COURSE ID: NORMAL
RAD ONC ARIA COURSE INTENT: NORMAL
RAD ONC ARIA COURSE LAST TREATMENT DATE: NORMAL
RAD ONC ARIA COURSE SESSION NUMBER: 12
RAD ONC ARIA COURSE START DATE: NORMAL
RAD ONC ARIA COURSE TREATMENT ELAPSED DAYS: 15
RAD ONC ARIA PLAN FRACTIONS TREATED TO DATE: 11
RAD ONC ARIA PLAN ID: NORMAL
RAD ONC ARIA PLAN PRESCRIBED DOSE PER FRACTION: 2 GY
RAD ONC ARIA PLAN PRIMARY REFERENCE POINT: NORMAL
RAD ONC ARIA PLAN TOTAL FRACTIONS PRESCRIBED: 35
RAD ONC ARIA PLAN TOTAL PRESCRIBED DOSE: 7000 CGY
RAD ONC ARIA REFERENCE POINT DOSAGE GIVEN TO DATE: 15.16 GY
RAD ONC ARIA REFERENCE POINT DOSAGE GIVEN TO DATE: 24 GY
RAD ONC ARIA REFERENCE POINT ID: NORMAL
RAD ONC ARIA REFERENCE POINT ID: NORMAL
RAD ONC ARIA REFERENCE POINT SESSION DOSAGE GIVEN: 1.26 GY
RAD ONC ARIA REFERENCE POINT SESSION DOSAGE GIVEN: 2 GY

## 2024-06-13 PROCEDURE — 77386 HC NTSTY MODUL RAD TX DLVR CPLX: CPT

## 2024-06-14 ENCOUNTER — HOSPITAL ENCOUNTER (OUTPATIENT)
Facility: HOSPITAL | Age: 69
Discharge: HOME | End: 2024-06-17
Attending: RADIOLOGY
Payer: MEDICARE

## 2024-06-14 LAB
RAD ONC ARIA COURSE FIRST TREATMENT DATE: NORMAL
RAD ONC ARIA COURSE ID: NORMAL
RAD ONC ARIA COURSE INTENT: NORMAL
RAD ONC ARIA COURSE LAST TREATMENT DATE: NORMAL
RAD ONC ARIA COURSE SESSION NUMBER: 13
RAD ONC ARIA COURSE START DATE: NORMAL
RAD ONC ARIA COURSE TREATMENT ELAPSED DAYS: 16
RAD ONC ARIA PLAN FRACTIONS TREATED TO DATE: 12
RAD ONC ARIA PLAN ID: NORMAL
RAD ONC ARIA PLAN PRESCRIBED DOSE PER FRACTION: 2 GY
RAD ONC ARIA PLAN PRIMARY REFERENCE POINT: NORMAL
RAD ONC ARIA PLAN TOTAL FRACTIONS PRESCRIBED: 35
RAD ONC ARIA PLAN TOTAL PRESCRIBED DOSE: 7000 CGY
RAD ONC ARIA REFERENCE POINT DOSAGE GIVEN TO DATE: 16.42 GY
RAD ONC ARIA REFERENCE POINT DOSAGE GIVEN TO DATE: 26 GY
RAD ONC ARIA REFERENCE POINT ID: NORMAL
RAD ONC ARIA REFERENCE POINT ID: NORMAL
RAD ONC ARIA REFERENCE POINT SESSION DOSAGE GIVEN: 1.26 GY
RAD ONC ARIA REFERENCE POINT SESSION DOSAGE GIVEN: 2 GY

## 2024-06-14 PROCEDURE — 77386 HC NTSTY MODUL RAD TX DLVR CPLX: CPT

## 2024-06-17 ENCOUNTER — HOSPITAL ENCOUNTER (OUTPATIENT)
Facility: HOSPITAL | Age: 69
Discharge: HOME | End: 2024-06-20
Attending: RADIOLOGY
Payer: MEDICARE

## 2024-06-17 LAB
RAD ONC ARIA COURSE FIRST TREATMENT DATE: NORMAL
RAD ONC ARIA COURSE ID: NORMAL
RAD ONC ARIA COURSE INTENT: NORMAL
RAD ONC ARIA COURSE LAST TREATMENT DATE: NORMAL
RAD ONC ARIA COURSE SESSION NUMBER: 14
RAD ONC ARIA COURSE START DATE: NORMAL
RAD ONC ARIA COURSE TREATMENT ELAPSED DAYS: 19
RAD ONC ARIA PLAN FRACTIONS TREATED TO DATE: 13
RAD ONC ARIA PLAN ID: NORMAL
RAD ONC ARIA PLAN PRESCRIBED DOSE PER FRACTION: 2 GY
RAD ONC ARIA PLAN PRIMARY REFERENCE POINT: NORMAL
RAD ONC ARIA PLAN TOTAL FRACTIONS PRESCRIBED: 35
RAD ONC ARIA PLAN TOTAL PRESCRIBED DOSE: 7000 CGY
RAD ONC ARIA REFERENCE POINT DOSAGE GIVEN TO DATE: 17.67 GY
RAD ONC ARIA REFERENCE POINT DOSAGE GIVEN TO DATE: 28 GY
RAD ONC ARIA REFERENCE POINT ID: NORMAL
RAD ONC ARIA REFERENCE POINT ID: NORMAL
RAD ONC ARIA REFERENCE POINT SESSION DOSAGE GIVEN: 1.26 GY
RAD ONC ARIA REFERENCE POINT SESSION DOSAGE GIVEN: 2 GY

## 2024-06-17 PROCEDURE — 77386 HC NTSTY MODUL RAD TX DLVR CPLX: CPT

## 2024-06-18 ENCOUNTER — HOSPITAL ENCOUNTER (OUTPATIENT)
Facility: HOSPITAL | Age: 69
Discharge: HOME | End: 2024-06-21
Attending: RADIOLOGY
Payer: MEDICARE

## 2024-06-18 LAB
RAD ONC ARIA COURSE FIRST TREATMENT DATE: NORMAL
RAD ONC ARIA COURSE ID: NORMAL
RAD ONC ARIA COURSE INTENT: NORMAL
RAD ONC ARIA COURSE LAST TREATMENT DATE: NORMAL
RAD ONC ARIA COURSE SESSION NUMBER: 15
RAD ONC ARIA COURSE START DATE: NORMAL
RAD ONC ARIA COURSE TREATMENT ELAPSED DAYS: 20
RAD ONC ARIA PLAN FRACTIONS TREATED TO DATE: 14
RAD ONC ARIA PLAN ID: NORMAL
RAD ONC ARIA PLAN PRESCRIBED DOSE PER FRACTION: 2 GY
RAD ONC ARIA PLAN PRIMARY REFERENCE POINT: NORMAL
RAD ONC ARIA PLAN TOTAL FRACTIONS PRESCRIBED: 35
RAD ONC ARIA PLAN TOTAL PRESCRIBED DOSE: 7000 CGY
RAD ONC ARIA REFERENCE POINT DOSAGE GIVEN TO DATE: 18.93 GY
RAD ONC ARIA REFERENCE POINT DOSAGE GIVEN TO DATE: 30 GY
RAD ONC ARIA REFERENCE POINT ID: NORMAL
RAD ONC ARIA REFERENCE POINT ID: NORMAL
RAD ONC ARIA REFERENCE POINT SESSION DOSAGE GIVEN: 1.26 GY
RAD ONC ARIA REFERENCE POINT SESSION DOSAGE GIVEN: 2 GY

## 2024-06-18 PROCEDURE — 77336 RADIATION PHYSICS CONSULT: CPT

## 2024-06-18 PROCEDURE — 77386 HC NTSTY MODUL RAD TX DLVR CPLX: CPT

## 2024-06-19 ENCOUNTER — HOSPITAL ENCOUNTER (OUTPATIENT)
Facility: HOSPITAL | Age: 69
Discharge: HOME | End: 2024-06-22
Attending: RADIOLOGY
Payer: MEDICARE

## 2024-06-19 LAB
RAD ONC ARIA COURSE FIRST TREATMENT DATE: NORMAL
RAD ONC ARIA COURSE ID: NORMAL
RAD ONC ARIA COURSE INTENT: NORMAL
RAD ONC ARIA COURSE LAST TREATMENT DATE: NORMAL
RAD ONC ARIA COURSE SESSION NUMBER: 16
RAD ONC ARIA COURSE START DATE: NORMAL
RAD ONC ARIA COURSE TREATMENT ELAPSED DAYS: 21
RAD ONC ARIA PLAN FRACTIONS TREATED TO DATE: 15
RAD ONC ARIA PLAN ID: NORMAL
RAD ONC ARIA PLAN PRESCRIBED DOSE PER FRACTION: 2 GY
RAD ONC ARIA PLAN PRIMARY REFERENCE POINT: NORMAL
RAD ONC ARIA PLAN TOTAL FRACTIONS PRESCRIBED: 35
RAD ONC ARIA PLAN TOTAL PRESCRIBED DOSE: 7000 CGY
RAD ONC ARIA REFERENCE POINT DOSAGE GIVEN TO DATE: 20.19 GY
RAD ONC ARIA REFERENCE POINT DOSAGE GIVEN TO DATE: 32 GY
RAD ONC ARIA REFERENCE POINT ID: NORMAL
RAD ONC ARIA REFERENCE POINT ID: NORMAL
RAD ONC ARIA REFERENCE POINT SESSION DOSAGE GIVEN: 1.26 GY
RAD ONC ARIA REFERENCE POINT SESSION DOSAGE GIVEN: 2 GY

## 2024-06-19 PROCEDURE — 77386 HC NTSTY MODUL RAD TX DLVR CPLX: CPT

## 2024-06-20 ENCOUNTER — HOSPITAL ENCOUNTER (OUTPATIENT)
Facility: HOSPITAL | Age: 69
Discharge: HOME | End: 2024-06-23
Attending: RADIOLOGY
Payer: MEDICARE

## 2024-06-20 LAB
RAD ONC ARIA COURSE FIRST TREATMENT DATE: NORMAL
RAD ONC ARIA COURSE ID: NORMAL
RAD ONC ARIA COURSE INTENT: NORMAL
RAD ONC ARIA COURSE LAST TREATMENT DATE: NORMAL
RAD ONC ARIA COURSE SESSION NUMBER: 17
RAD ONC ARIA COURSE START DATE: NORMAL
RAD ONC ARIA COURSE TREATMENT ELAPSED DAYS: 22
RAD ONC ARIA PLAN FRACTIONS TREATED TO DATE: 16
RAD ONC ARIA PLAN ID: NORMAL
RAD ONC ARIA PLAN PRESCRIBED DOSE PER FRACTION: 2 GY
RAD ONC ARIA PLAN PRIMARY REFERENCE POINT: NORMAL
RAD ONC ARIA PLAN TOTAL FRACTIONS PRESCRIBED: 35
RAD ONC ARIA PLAN TOTAL PRESCRIBED DOSE: 7000 CGY
RAD ONC ARIA REFERENCE POINT DOSAGE GIVEN TO DATE: 21.44 GY
RAD ONC ARIA REFERENCE POINT DOSAGE GIVEN TO DATE: 34 GY
RAD ONC ARIA REFERENCE POINT ID: NORMAL
RAD ONC ARIA REFERENCE POINT ID: NORMAL
RAD ONC ARIA REFERENCE POINT SESSION DOSAGE GIVEN: 1.26 GY
RAD ONC ARIA REFERENCE POINT SESSION DOSAGE GIVEN: 2 GY

## 2024-06-20 PROCEDURE — 77386 HC NTSTY MODUL RAD TX DLVR CPLX: CPT

## 2024-06-21 ENCOUNTER — HOSPITAL ENCOUNTER (OUTPATIENT)
Facility: HOSPITAL | Age: 69
Discharge: HOME | End: 2024-06-24
Attending: RADIOLOGY
Payer: MEDICARE

## 2024-06-21 LAB
RAD ONC ARIA COURSE FIRST TREATMENT DATE: NORMAL
RAD ONC ARIA COURSE ID: NORMAL
RAD ONC ARIA COURSE INTENT: NORMAL
RAD ONC ARIA COURSE LAST TREATMENT DATE: NORMAL
RAD ONC ARIA COURSE SESSION NUMBER: 18
RAD ONC ARIA COURSE START DATE: NORMAL
RAD ONC ARIA COURSE TREATMENT ELAPSED DAYS: 23
RAD ONC ARIA PLAN FRACTIONS TREATED TO DATE: 17
RAD ONC ARIA PLAN ID: NORMAL
RAD ONC ARIA PLAN PRESCRIBED DOSE PER FRACTION: 2 GY
RAD ONC ARIA PLAN PRIMARY REFERENCE POINT: NORMAL
RAD ONC ARIA PLAN TOTAL FRACTIONS PRESCRIBED: 35
RAD ONC ARIA PLAN TOTAL PRESCRIBED DOSE: 7000 CGY
RAD ONC ARIA REFERENCE POINT DOSAGE GIVEN TO DATE: 22.7 GY
RAD ONC ARIA REFERENCE POINT DOSAGE GIVEN TO DATE: 36 GY
RAD ONC ARIA REFERENCE POINT ID: NORMAL
RAD ONC ARIA REFERENCE POINT ID: NORMAL
RAD ONC ARIA REFERENCE POINT SESSION DOSAGE GIVEN: 1.26 GY
RAD ONC ARIA REFERENCE POINT SESSION DOSAGE GIVEN: 2 GY

## 2024-06-21 PROCEDURE — 77386 HC NTSTY MODUL RAD TX DLVR CPLX: CPT

## 2024-06-24 ENCOUNTER — HOSPITAL ENCOUNTER (OUTPATIENT)
Facility: HOSPITAL | Age: 69
Discharge: HOME | End: 2024-06-27
Attending: RADIOLOGY
Payer: MEDICARE

## 2024-06-24 VITALS — BODY MASS INDEX: 42.53 KG/M2 | WEIGHT: 240.1 LBS

## 2024-06-24 DIAGNOSIS — C32.9 LARYNGEAL CANCER (HCC): Primary | ICD-10-CM

## 2024-06-24 LAB
RAD ONC ARIA COURSE FIRST TREATMENT DATE: NORMAL
RAD ONC ARIA COURSE ID: NORMAL
RAD ONC ARIA COURSE INTENT: NORMAL
RAD ONC ARIA COURSE LAST TREATMENT DATE: NORMAL
RAD ONC ARIA COURSE SESSION NUMBER: 19
RAD ONC ARIA COURSE START DATE: NORMAL
RAD ONC ARIA COURSE TREATMENT ELAPSED DAYS: 26
RAD ONC ARIA PLAN FRACTIONS TREATED TO DATE: 18
RAD ONC ARIA PLAN ID: NORMAL
RAD ONC ARIA PLAN PRESCRIBED DOSE PER FRACTION: 2 GY
RAD ONC ARIA PLAN PRIMARY REFERENCE POINT: NORMAL
RAD ONC ARIA PLAN TOTAL FRACTIONS PRESCRIBED: 35
RAD ONC ARIA PLAN TOTAL PRESCRIBED DOSE: 7000 CGY
RAD ONC ARIA REFERENCE POINT DOSAGE GIVEN TO DATE: 23.96 GY
RAD ONC ARIA REFERENCE POINT DOSAGE GIVEN TO DATE: 38 GY
RAD ONC ARIA REFERENCE POINT ID: NORMAL
RAD ONC ARIA REFERENCE POINT ID: NORMAL
RAD ONC ARIA REFERENCE POINT SESSION DOSAGE GIVEN: 1.26 GY
RAD ONC ARIA REFERENCE POINT SESSION DOSAGE GIVEN: 2 GY

## 2024-06-24 PROCEDURE — 77386 HC NTSTY MODUL RAD TX DLVR CPLX: CPT

## 2024-06-24 ASSESSMENT — PAIN DESCRIPTION - LOCATION: LOCATION: THROAT

## 2024-06-24 ASSESSMENT — PAIN SCALES - GENERAL: PAINLEVEL_OUTOF10: 2

## 2024-06-24 ASSESSMENT — PAIN DESCRIPTION - PAIN TYPE: TYPE: CHRONIC PAIN

## 2024-06-24 ASSESSMENT — PAIN DESCRIPTION - ORIENTATION: ORIENTATION: POSTERIOR

## 2024-06-24 NOTE — PROGRESS NOTES
Cancer [place] at [facility]  [facility]      RADIATION ONCOLOGY WEEKLY PROGRESS NOTE    Encounter Date: 06/24/24   Patient Name: April Mayo  YOB: 1955  Medical Record Number: [id number]    DIAGNOSIS:   No diagnosis found.  STAGING:    Cancer Staging   No matching staging information was found for the patient.  AJCC Staging has been reviewed    ASSESSMENT:   Doing well. Pain controlled. With meds.   Trach functioning. No other issues.       TREATMENT DETAILS:     Treatment Site Dose/Fx (cGy) #Fx Current Dose (cGy) Total Planned Dose (cGy) Start Date End Date   Supraglottic larynx [phone] 7000 5/31/2024 06/24/24              Concurrent systemic therapy    SUBJECTIVE   Ms. Mayo is a 69 y.o. female seen today for her weekly on-treatment evaluation    6/24/24:  First OTV, at fraction 1.  .  Went over RT schedule and answered questions.      OBJECTIVE/PHYSICAL EXAM:   VITAL SIGNS: Wt 108.9 kg (240 lb 1.6 oz)   BMI 42.53 kg/m²   Wt Readings from Last 5 Encounters:   06/24/24 108.9 kg (240 lb 1.6 oz)   05/03/24 107 kg (236 lb)   04/30/24 107 kg (236 lb)   04/16/24 107.1 kg (236 lb 3.2 oz)   03/29/24 112.5 kg (248 lb 1.6 oz)   Pain Level: 2      Performance status:  ECOG 1, No physically strenuous activity, but ambulatory and able to carry out light or sedentary work (eg office work, light house work)  General: Alert and conversant, in NAD  Skin: wnl.  Trach in place    LABS:  Personally reviewed    MEDICATIONS:    Current Outpatient Medications:     amoxicillin (AMOXIL) 500 MG capsule, Take 1 capsule by mouth 3 times daily, Disp: , Rfl:     albuterol (PROVENTIL) (2.5 MG/3ML) 0.083% nebulizer solution, Take 3 mLs by nebulization every 6 hours as needed for Wheezing, Disp: , Rfl:     albuterol sulfate HFA (PROVENTIL;VENTOLIN;PROAIR) 108 (90 Base) MCG/ACT inhaler, Inhale 2 puffs into the lungs every 6 hours as needed for Wheezing, Disp: , Rfl:

## 2024-06-25 ENCOUNTER — HOSPITAL ENCOUNTER (OUTPATIENT)
Facility: HOSPITAL | Age: 69
Discharge: HOME | End: 2024-06-28
Attending: RADIOLOGY
Payer: MEDICARE

## 2024-06-25 LAB
RAD ONC ARIA COURSE FIRST TREATMENT DATE: NORMAL
RAD ONC ARIA COURSE ID: NORMAL
RAD ONC ARIA COURSE INTENT: NORMAL
RAD ONC ARIA COURSE LAST TREATMENT DATE: NORMAL
RAD ONC ARIA COURSE SESSION NUMBER: 20
RAD ONC ARIA COURSE START DATE: NORMAL
RAD ONC ARIA COURSE TREATMENT ELAPSED DAYS: 27
RAD ONC ARIA PLAN FRACTIONS TREATED TO DATE: 19
RAD ONC ARIA PLAN ID: NORMAL
RAD ONC ARIA PLAN PRESCRIBED DOSE PER FRACTION: 2 GY
RAD ONC ARIA PLAN PRIMARY REFERENCE POINT: NORMAL
RAD ONC ARIA PLAN TOTAL FRACTIONS PRESCRIBED: 35
RAD ONC ARIA PLAN TOTAL PRESCRIBED DOSE: 7000 CGY
RAD ONC ARIA REFERENCE POINT DOSAGE GIVEN TO DATE: 25.22 GY
RAD ONC ARIA REFERENCE POINT DOSAGE GIVEN TO DATE: 40 GY
RAD ONC ARIA REFERENCE POINT ID: NORMAL
RAD ONC ARIA REFERENCE POINT ID: NORMAL
RAD ONC ARIA REFERENCE POINT SESSION DOSAGE GIVEN: 2 GY

## 2024-06-25 PROCEDURE — 77386 HC NTSTY MODUL RAD TX DLVR CPLX: CPT

## 2024-06-25 PROCEDURE — 77336 RADIATION PHYSICS CONSULT: CPT

## 2024-06-26 ENCOUNTER — HOSPITAL ENCOUNTER (OUTPATIENT)
Facility: HOSPITAL | Age: 69
Discharge: HOME | End: 2024-06-29
Attending: RADIOLOGY
Payer: MEDICARE

## 2024-06-26 DIAGNOSIS — C32.9 LARYNX CANCER (HCC): ICD-10-CM

## 2024-06-26 DIAGNOSIS — C32.9 LARYNX CANCER (HCC): Primary | ICD-10-CM

## 2024-06-26 LAB
RAD ONC ARIA COURSE FIRST TREATMENT DATE: NORMAL
RAD ONC ARIA COURSE ID: NORMAL
RAD ONC ARIA COURSE INTENT: NORMAL
RAD ONC ARIA COURSE LAST TREATMENT DATE: NORMAL
RAD ONC ARIA COURSE SESSION NUMBER: 21
RAD ONC ARIA COURSE START DATE: NORMAL
RAD ONC ARIA COURSE TREATMENT ELAPSED DAYS: 28
RAD ONC ARIA PLAN FRACTIONS TREATED TO DATE: 20
RAD ONC ARIA PLAN ID: NORMAL
RAD ONC ARIA PLAN PRESCRIBED DOSE PER FRACTION: 2 GY
RAD ONC ARIA PLAN PRIMARY REFERENCE POINT: NORMAL
RAD ONC ARIA PLAN TOTAL FRACTIONS PRESCRIBED: 35
RAD ONC ARIA PLAN TOTAL PRESCRIBED DOSE: 7000 CGY
RAD ONC ARIA REFERENCE POINT DOSAGE GIVEN TO DATE: 26.47 GY
RAD ONC ARIA REFERENCE POINT DOSAGE GIVEN TO DATE: 42 GY
RAD ONC ARIA REFERENCE POINT ID: NORMAL
RAD ONC ARIA REFERENCE POINT ID: NORMAL
RAD ONC ARIA REFERENCE POINT SESSION DOSAGE GIVEN: 1.26 GY
RAD ONC ARIA REFERENCE POINT SESSION DOSAGE GIVEN: 2 GY

## 2024-06-26 PROCEDURE — 77386 HC NTSTY MODUL RAD TX DLVR CPLX: CPT

## 2024-06-26 RX ORDER — ACETAMINOPHEN AND CODEINE PHOSPHATE 120; 12 MG/5ML; MG/5ML
5 SOLUTION ORAL EVERY 6 HOURS PRN
Qty: 1 EACH | Refills: 1 | Status: SHIPPED | OUTPATIENT
Start: 2024-06-26 | End: 2024-06-26

## 2024-06-26 RX ORDER — ACETAMINOPHEN AND CODEINE PHOSPHATE 120; 12 MG/5ML; MG/5ML
5 SOLUTION ORAL EVERY 6 HOURS PRN
Qty: 1 EACH | Refills: 1 | Status: SHIPPED | OUTPATIENT
Start: 2024-06-26 | End: 2024-07-10

## 2024-06-27 ENCOUNTER — HOSPITAL ENCOUNTER (OUTPATIENT)
Facility: HOSPITAL | Age: 69
Discharge: HOME | End: 2024-06-30
Attending: RADIOLOGY
Payer: MEDICARE

## 2024-06-27 LAB
RAD ONC ARIA COURSE FIRST TREATMENT DATE: NORMAL
RAD ONC ARIA COURSE ID: NORMAL
RAD ONC ARIA COURSE INTENT: NORMAL
RAD ONC ARIA COURSE LAST TREATMENT DATE: NORMAL
RAD ONC ARIA COURSE SESSION NUMBER: 22
RAD ONC ARIA COURSE START DATE: NORMAL
RAD ONC ARIA COURSE TREATMENT ELAPSED DAYS: 29
RAD ONC ARIA PLAN FRACTIONS TREATED TO DATE: 21
RAD ONC ARIA PLAN ID: NORMAL
RAD ONC ARIA PLAN PRESCRIBED DOSE PER FRACTION: 2 GY
RAD ONC ARIA PLAN PRIMARY REFERENCE POINT: NORMAL
RAD ONC ARIA PLAN TOTAL FRACTIONS PRESCRIBED: 35
RAD ONC ARIA PLAN TOTAL PRESCRIBED DOSE: 7000 CGY
RAD ONC ARIA REFERENCE POINT DOSAGE GIVEN TO DATE: 27.73 GY
RAD ONC ARIA REFERENCE POINT DOSAGE GIVEN TO DATE: 44 GY
RAD ONC ARIA REFERENCE POINT ID: NORMAL
RAD ONC ARIA REFERENCE POINT ID: NORMAL
RAD ONC ARIA REFERENCE POINT SESSION DOSAGE GIVEN: 1.26 GY
RAD ONC ARIA REFERENCE POINT SESSION DOSAGE GIVEN: 2 GY

## 2024-06-27 PROCEDURE — 77386 HC NTSTY MODUL RAD TX DLVR CPLX: CPT

## 2024-06-28 ENCOUNTER — HOSPITAL ENCOUNTER (OUTPATIENT)
Facility: HOSPITAL | Age: 69
Discharge: HOME | End: 2024-07-01
Attending: RADIOLOGY
Payer: MEDICARE

## 2024-06-28 LAB
RAD ONC ARIA COURSE FIRST TREATMENT DATE: NORMAL
RAD ONC ARIA COURSE ID: NORMAL
RAD ONC ARIA COURSE INTENT: NORMAL
RAD ONC ARIA COURSE LAST TREATMENT DATE: NORMAL
RAD ONC ARIA COURSE SESSION NUMBER: 23
RAD ONC ARIA COURSE START DATE: NORMAL
RAD ONC ARIA COURSE TREATMENT ELAPSED DAYS: 30
RAD ONC ARIA PLAN FRACTIONS TREATED TO DATE: 22
RAD ONC ARIA PLAN ID: NORMAL
RAD ONC ARIA PLAN PRESCRIBED DOSE PER FRACTION: 2 GY
RAD ONC ARIA PLAN PRIMARY REFERENCE POINT: NORMAL
RAD ONC ARIA PLAN TOTAL FRACTIONS PRESCRIBED: 35
RAD ONC ARIA PLAN TOTAL PRESCRIBED DOSE: 7000 CGY
RAD ONC ARIA REFERENCE POINT DOSAGE GIVEN TO DATE: 28.99 GY
RAD ONC ARIA REFERENCE POINT DOSAGE GIVEN TO DATE: 46 GY
RAD ONC ARIA REFERENCE POINT ID: NORMAL
RAD ONC ARIA REFERENCE POINT ID: NORMAL
RAD ONC ARIA REFERENCE POINT SESSION DOSAGE GIVEN: 1.26 GY
RAD ONC ARIA REFERENCE POINT SESSION DOSAGE GIVEN: 2 GY

## 2024-06-28 PROCEDURE — 77386 HC NTSTY MODUL RAD TX DLVR CPLX: CPT

## 2024-07-01 ENCOUNTER — HOSPITAL ENCOUNTER (OUTPATIENT)
Facility: HOSPITAL | Age: 69
Discharge: HOME | End: 2024-07-04
Attending: RADIOLOGY
Payer: MEDICARE

## 2024-07-01 VITALS — BODY MASS INDEX: 41.27 KG/M2 | WEIGHT: 233 LBS

## 2024-07-01 DIAGNOSIS — C32.9 LARYNX CANCER (HCC): Primary | ICD-10-CM

## 2024-07-01 LAB
RAD ONC ARIA COURSE FIRST TREATMENT DATE: NORMAL
RAD ONC ARIA COURSE ID: NORMAL
RAD ONC ARIA COURSE INTENT: NORMAL
RAD ONC ARIA COURSE LAST TREATMENT DATE: NORMAL
RAD ONC ARIA COURSE SESSION NUMBER: 24
RAD ONC ARIA COURSE START DATE: NORMAL
RAD ONC ARIA COURSE TREATMENT ELAPSED DAYS: 33
RAD ONC ARIA PLAN FRACTIONS TREATED TO DATE: 23
RAD ONC ARIA PLAN ID: NORMAL
RAD ONC ARIA PLAN PRESCRIBED DOSE PER FRACTION: 2 GY
RAD ONC ARIA PLAN PRIMARY REFERENCE POINT: NORMAL
RAD ONC ARIA PLAN TOTAL FRACTIONS PRESCRIBED: 35
RAD ONC ARIA PLAN TOTAL PRESCRIBED DOSE: 7000 CGY
RAD ONC ARIA REFERENCE POINT DOSAGE GIVEN TO DATE: 30.24 GY
RAD ONC ARIA REFERENCE POINT DOSAGE GIVEN TO DATE: 48 GY
RAD ONC ARIA REFERENCE POINT ID: NORMAL
RAD ONC ARIA REFERENCE POINT ID: NORMAL
RAD ONC ARIA REFERENCE POINT SESSION DOSAGE GIVEN: 1.26 GY
RAD ONC ARIA REFERENCE POINT SESSION DOSAGE GIVEN: 2 GY

## 2024-07-01 PROCEDURE — 77386 HC NTSTY MODUL RAD TX DLVR CPLX: CPT

## 2024-07-01 ASSESSMENT — PAIN DESCRIPTION - LOCATION: LOCATION: THROAT

## 2024-07-01 ASSESSMENT — PAIN SCALES - GENERAL: PAINLEVEL_OUTOF10: 6

## 2024-07-01 NOTE — PROGRESS NOTES
[facility] [place] [facility] [place]  [facility]      RADIATION ONCOLOGY WEEKLY PROGRESS NOTE    Encounter Date: 07/01/24   Patient Name: April Mayo  YOB: 1955  Medical Record Number: [id number]    DIAGNOSIS:   No diagnosis found.  STAGING:    Cancer Staging   No matching staging information was found for the patient.  AJCC Staging has been reviewed    ASSESSMENT:   Doing well. Pain controlled. With meds.   Trach functioning. No other issues.       TREATMENT DETAILS:     Treatment Site Dose/Fx (cGy) #Fx Current Dose (cGy) Total Planned Dose (cGy) Start Date End Date   Supraglottic larynx [phone] 7000 5/31/2024 07/01/24              Concurrent systemic therapy    SUBJECTIVE   Ms. Mayo is a 69 y.o. female seen today for her weekly on-treatment evaluation    6/24/24:  First OTV, at fraction 1.  .  Went over RT schedule and answered questions.  7/1: pain controlled w tylenol codeine.   Neck pain    OBJECTIVE/PHYSICAL EXAM:   VITAL SIGNS: There were no vitals taken for this visit.  Wt Readings from Last 5 Encounters:   07/01/24 105.7 kg (233 lb)   06/24/24 108.9 kg (240 lb 1.6 oz)   05/03/24 107 kg (236 lb)   04/30/24 107 kg (236 lb)   04/16/24 107.1 kg (236 lb 3.2 oz)          Performance status:  ECOG 1, No physically strenuous activity, but ambulatory and able to carry out light or sedentary work (eg office work, light house work)  General: Alert and conversant, in NAD  Skin: wnl.  Trach in place  7/1: small area dry desquamation at trach site. No infection  LABS:  Personally reviewed    MEDICATIONS:    Current Outpatient Medications:     acetaminophen-codeine 120-12 MG/5ML solution, Take 5 mLs by mouth every 6 hours as needed for Pain for up to 14 days. Max Daily Amount: 20 mLs, Disp: 1 each, Rfl: 1    amoxicillin (AMOXIL) 500 MG capsule, Take 1 capsule by mouth 3 times daily, Disp: , Rfl:     albuterol (PROVENTIL) (2.5 MG/3ML) 0.083% nebulizer

## 2024-07-02 ENCOUNTER — HOSPITAL ENCOUNTER (OUTPATIENT)
Facility: HOSPITAL | Age: 69
Discharge: HOME | End: 2024-07-05
Attending: RADIOLOGY
Payer: MEDICARE

## 2024-07-02 LAB
RAD ONC ARIA COURSE FIRST TREATMENT DATE: NORMAL
RAD ONC ARIA COURSE ID: NORMAL
RAD ONC ARIA COURSE INTENT: NORMAL
RAD ONC ARIA COURSE LAST TREATMENT DATE: NORMAL
RAD ONC ARIA COURSE SESSION NUMBER: 25
RAD ONC ARIA COURSE START DATE: NORMAL
RAD ONC ARIA COURSE TREATMENT ELAPSED DAYS: 34
RAD ONC ARIA PLAN FRACTIONS TREATED TO DATE: 24
RAD ONC ARIA PLAN ID: NORMAL
RAD ONC ARIA PLAN PRESCRIBED DOSE PER FRACTION: 2 GY
RAD ONC ARIA PLAN PRIMARY REFERENCE POINT: NORMAL
RAD ONC ARIA PLAN TOTAL FRACTIONS PRESCRIBED: 35
RAD ONC ARIA PLAN TOTAL PRESCRIBED DOSE: 7000 CGY
RAD ONC ARIA REFERENCE POINT DOSAGE GIVEN TO DATE: 31.5 GY
RAD ONC ARIA REFERENCE POINT DOSAGE GIVEN TO DATE: 50 GY
RAD ONC ARIA REFERENCE POINT ID: NORMAL
RAD ONC ARIA REFERENCE POINT ID: NORMAL
RAD ONC ARIA REFERENCE POINT SESSION DOSAGE GIVEN: 1.26 GY
RAD ONC ARIA REFERENCE POINT SESSION DOSAGE GIVEN: 2 GY

## 2024-07-02 PROCEDURE — 77336 RADIATION PHYSICS CONSULT: CPT

## 2024-07-02 PROCEDURE — 77386 HC NTSTY MODUL RAD TX DLVR CPLX: CPT

## 2024-07-03 ENCOUNTER — HOSPITAL ENCOUNTER (OUTPATIENT)
Facility: HOSPITAL | Age: 69
Discharge: HOME | End: 2024-07-06
Attending: RADIOLOGY
Payer: MEDICARE

## 2024-07-03 LAB
RAD ONC ARIA COURSE FIRST TREATMENT DATE: NORMAL
RAD ONC ARIA COURSE ID: NORMAL
RAD ONC ARIA COURSE INTENT: NORMAL
RAD ONC ARIA COURSE LAST TREATMENT DATE: NORMAL
RAD ONC ARIA COURSE SESSION NUMBER: 26
RAD ONC ARIA COURSE START DATE: NORMAL
RAD ONC ARIA COURSE TREATMENT ELAPSED DAYS: 35
RAD ONC ARIA PLAN FRACTIONS TREATED TO DATE: 25
RAD ONC ARIA PLAN ID: NORMAL
RAD ONC ARIA PLAN PRESCRIBED DOSE PER FRACTION: 2 GY
RAD ONC ARIA PLAN PRIMARY REFERENCE POINT: NORMAL
RAD ONC ARIA PLAN TOTAL FRACTIONS PRESCRIBED: 35
RAD ONC ARIA PLAN TOTAL PRESCRIBED DOSE: 7000 CGY
RAD ONC ARIA REFERENCE POINT DOSAGE GIVEN TO DATE: 32.76 GY
RAD ONC ARIA REFERENCE POINT DOSAGE GIVEN TO DATE: 52 GY
RAD ONC ARIA REFERENCE POINT ID: NORMAL
RAD ONC ARIA REFERENCE POINT ID: NORMAL
RAD ONC ARIA REFERENCE POINT SESSION DOSAGE GIVEN: 1.26 GY
RAD ONC ARIA REFERENCE POINT SESSION DOSAGE GIVEN: 2 GY

## 2024-07-03 PROCEDURE — 77386 HC NTSTY MODUL RAD TX DLVR CPLX: CPT

## 2024-07-05 ENCOUNTER — HOSPITAL ENCOUNTER (OUTPATIENT)
Facility: HOSPITAL | Age: 69
Discharge: HOME | End: 2024-07-08
Attending: RADIOLOGY
Payer: MEDICARE

## 2024-07-05 LAB
RAD ONC ARIA COURSE FIRST TREATMENT DATE: NORMAL
RAD ONC ARIA COURSE ID: NORMAL
RAD ONC ARIA COURSE INTENT: NORMAL
RAD ONC ARIA COURSE LAST TREATMENT DATE: NORMAL
RAD ONC ARIA COURSE SESSION NUMBER: 27
RAD ONC ARIA COURSE START DATE: NORMAL
RAD ONC ARIA COURSE TREATMENT ELAPSED DAYS: 37
RAD ONC ARIA PLAN FRACTIONS TREATED TO DATE: 26
RAD ONC ARIA PLAN ID: NORMAL
RAD ONC ARIA PLAN PRESCRIBED DOSE PER FRACTION: 2 GY
RAD ONC ARIA PLAN PRIMARY REFERENCE POINT: NORMAL
RAD ONC ARIA PLAN TOTAL FRACTIONS PRESCRIBED: 35
RAD ONC ARIA PLAN TOTAL PRESCRIBED DOSE: 7000 CGY
RAD ONC ARIA REFERENCE POINT DOSAGE GIVEN TO DATE: 34.01 GY
RAD ONC ARIA REFERENCE POINT DOSAGE GIVEN TO DATE: 54 GY
RAD ONC ARIA REFERENCE POINT ID: NORMAL
RAD ONC ARIA REFERENCE POINT ID: NORMAL
RAD ONC ARIA REFERENCE POINT SESSION DOSAGE GIVEN: 1.26 GY
RAD ONC ARIA REFERENCE POINT SESSION DOSAGE GIVEN: 2 GY

## 2024-07-05 PROCEDURE — 77386 HC NTSTY MODUL RAD TX DLVR CPLX: CPT

## 2024-07-05 NOTE — PROGRESS NOTES
[facility]    RADIATION ONCOLOGY WEEKLY PROGRESS NOTE    Encounter Date: 07/05/24   Patient Name: April Mayo  YOB: 1955  Medical Record Number: [id number]    DIAGNOSIS:   Squamous cell ca supraglottis  STAGING:   Cancer Staging   Clinical staging [id number]  AJCC Staging has been reviewed    ASSESSMENT:   70 yo female found to have T3N0M0 SC supraglottic ca after presenting with SOB and findings of a glottic mass.  Had trach place along with biopsy of a right TVC. CT revealed no adenopathy.  PET (4/23/24) showed metabolic uptake only in the right laryngeal mass ( SUV 10.8). No PET avid evidence of metastatic disease.   Receiving chemoradiation currently.  Dr Barragan is her chemotherapist.    TREATMENT DETAILS:     Treatment Site Dose/Fx (cGy) #Fx Current Dose (cGy) Total Planned Dose (cGy) Start Date End Date   Supraglottic  Neck :2 1/35  5/31/2024 5/31/2024   Supraglottic neck 26/35 200 5200 7000 5/29/2024 7/05/2024     Concurrent systemic therapy    SUBJECTIVE   Ms. Mayo is a 69 y.o. female seen today for her weekly on-treatment evaluation    7/5/24:  Patient seen this week by Dr Dietz.  Today the patient comes with her daughter to complain of burning sensation in the throat as she takes the Tylenol with codeine that was Rx 2 weeks ago.  Had only taken it once in a while and now burning worst.  She has allergies to codeine but listed as GI symptoms of nausea and vomiting which she denies currently.  She decided to discontinued the Tylenol with Codeine and taking regular tylenol and  or  Advil instead.  She wanted to let us know.  Pain with eating.  Recommending trying Honey prior to eating to act as lubricant.      OBJECTIVE/PHYSICAL EXAM:   VITAL SIGNS: There were no vitals taken for this visit.  Wt Readings from Last 5 Encounters:   07/01/24 105.7 kg (233 lb)   06/24/24 108.9 kg (240 lb 1.6 oz)   05/03/24 107 kg (236 lb)   04/30/24 107 kg (236 lb)

## 2024-07-08 ENCOUNTER — APPOINTMENT (OUTPATIENT)
Facility: HOSPITAL | Age: 69
End: 2024-07-08
Attending: RADIOLOGY
Payer: MEDICARE

## 2024-07-08 ENCOUNTER — HOSPITAL ENCOUNTER (OUTPATIENT)
Facility: HOSPITAL | Age: 69
Discharge: HOME | End: 2024-07-11
Attending: RADIOLOGY
Payer: MEDICARE

## 2024-07-08 VITALS — WEIGHT: 224.3 LBS | BODY MASS INDEX: 39.73 KG/M2

## 2024-07-08 DIAGNOSIS — C32.9 LARYNX CANCER (HCC): Primary | ICD-10-CM

## 2024-07-08 LAB
RAD ONC ARIA COURSE FIRST TREATMENT DATE: NORMAL
RAD ONC ARIA COURSE ID: NORMAL
RAD ONC ARIA COURSE INTENT: NORMAL
RAD ONC ARIA COURSE LAST TREATMENT DATE: NORMAL
RAD ONC ARIA COURSE SESSION NUMBER: 28
RAD ONC ARIA COURSE START DATE: NORMAL
RAD ONC ARIA COURSE TREATMENT ELAPSED DAYS: 40
RAD ONC ARIA PLAN FRACTIONS TREATED TO DATE: 27
RAD ONC ARIA PLAN ID: NORMAL
RAD ONC ARIA PLAN PRESCRIBED DOSE PER FRACTION: 2 GY
RAD ONC ARIA PLAN PRIMARY REFERENCE POINT: NORMAL
RAD ONC ARIA PLAN TOTAL FRACTIONS PRESCRIBED: 35
RAD ONC ARIA PLAN TOTAL PRESCRIBED DOSE: 7000 CGY
RAD ONC ARIA REFERENCE POINT DOSAGE GIVEN TO DATE: 35.27 GY
RAD ONC ARIA REFERENCE POINT DOSAGE GIVEN TO DATE: 56 GY
RAD ONC ARIA REFERENCE POINT ID: NORMAL
RAD ONC ARIA REFERENCE POINT ID: NORMAL
RAD ONC ARIA REFERENCE POINT SESSION DOSAGE GIVEN: 1.26 GY
RAD ONC ARIA REFERENCE POINT SESSION DOSAGE GIVEN: 2 GY

## 2024-07-08 PROCEDURE — 77386 HC NTSTY MODUL RAD TX DLVR CPLX: CPT

## 2024-07-08 NOTE — PROGRESS NOTES
Cancer [place] at [facility]  [facility]      RADIATION ONCOLOGY WEEKLY PROGRESS NOTE    Encounter Date: 07/08/24   Patient Name: April Mayo  YOB: 1955  Medical Record Number: [id number]    DIAGNOSIS:   No diagnosis found.  STAGING:    Cancer Staging   No matching staging information was found for the patient.  AJCC Staging has been reviewed    ASSESSMENT:   Doing well. Pain controlled. With meds.   Trach functioning. No other issues.       TREATMENT DETAILS:     Treatment Site Dose/Fx (cGy) #Fx Current Dose (cGy) Total Planned Dose (cGy) Start Date End Date   Supraglottic larynx [phone] 7000 5/31/2024 07/08/24              Concurrent systemic therapy    SUBJECTIVE   Ms. Mayo is a 69 y.o. female seen today for her weekly on-treatment evaluation    6/24/24:  First OTV, at fraction 1.  .  Went over RT schedule and answered questions.  7/1: pain controlled w tylenol codeine.   Neck pain  7/8:  unable to tolerate tylenol/codeine (burns throat). Taking honey with triple mix    OBJECTIVE/PHYSICAL EXAM:   VITAL SIGNS: Wt 101.7 kg (224 lb 4.8 oz)   BMI 39.73 kg/m²   Wt Readings from Last 5 Encounters:   07/08/24 101.7 kg (224 lb 4.8 oz)   07/01/24 105.7 kg (233 lb)   06/24/24 108.9 kg (240 lb 1.6 oz)   05/03/24 107 kg (236 lb)   04/30/24 107 kg (236 lb)          Performance status:  ECOG 1, No physically strenuous activity, but ambulatory and able to carry out light or sedentary work (eg office work, light house work)  General: Alert and conversant, in NAD  Skin: wnl.  Trach in place  7/1: small area dry desquamation at trach site. No infection  Weight stable. Desquamation around trach stable.   LABS:  Personally reviewed    MEDICATIONS:    Current Outpatient Medications:     acetaminophen-codeine 120-12 MG/5ML solution, Take 5 mLs by mouth every 6 hours as needed for Pain for up to 14 days. Max Daily Amount: 20 mLs, Disp: 1 each, Rfl: 1

## 2024-07-09 ENCOUNTER — APPOINTMENT (OUTPATIENT)
Facility: HOSPITAL | Age: 69
End: 2024-07-09
Attending: RADIOLOGY
Payer: MEDICARE

## 2024-07-09 ENCOUNTER — HOSPITAL ENCOUNTER (OUTPATIENT)
Facility: HOSPITAL | Age: 69
Discharge: HOME | End: 2024-07-12
Attending: RADIOLOGY
Payer: MEDICARE

## 2024-07-09 LAB
RAD ONC ARIA COURSE FIRST TREATMENT DATE: NORMAL
RAD ONC ARIA COURSE ID: NORMAL
RAD ONC ARIA COURSE INTENT: NORMAL
RAD ONC ARIA COURSE LAST TREATMENT DATE: NORMAL
RAD ONC ARIA COURSE SESSION NUMBER: 29
RAD ONC ARIA COURSE START DATE: NORMAL
RAD ONC ARIA COURSE TREATMENT ELAPSED DAYS: 41
RAD ONC ARIA PLAN FRACTIONS TREATED TO DATE: 28
RAD ONC ARIA PLAN ID: NORMAL
RAD ONC ARIA PLAN PRESCRIBED DOSE PER FRACTION: 2 GY
RAD ONC ARIA PLAN PRIMARY REFERENCE POINT: NORMAL
RAD ONC ARIA PLAN TOTAL FRACTIONS PRESCRIBED: 35
RAD ONC ARIA PLAN TOTAL PRESCRIBED DOSE: 7000 CGY
RAD ONC ARIA REFERENCE POINT DOSAGE GIVEN TO DATE: 36.53 GY
RAD ONC ARIA REFERENCE POINT DOSAGE GIVEN TO DATE: 58 GY
RAD ONC ARIA REFERENCE POINT ID: NORMAL
RAD ONC ARIA REFERENCE POINT ID: NORMAL
RAD ONC ARIA REFERENCE POINT SESSION DOSAGE GIVEN: 1.26 GY
RAD ONC ARIA REFERENCE POINT SESSION DOSAGE GIVEN: 2 GY

## 2024-07-09 PROCEDURE — 77386 HC NTSTY MODUL RAD TX DLVR CPLX: CPT

## 2024-07-10 ENCOUNTER — APPOINTMENT (OUTPATIENT)
Facility: HOSPITAL | Age: 69
End: 2024-07-10
Attending: RADIOLOGY
Payer: MEDICARE

## 2024-07-11 ENCOUNTER — HOSPITAL ENCOUNTER (OUTPATIENT)
Facility: HOSPITAL | Age: 69
Discharge: HOME | End: 2024-07-14
Attending: RADIOLOGY
Payer: MEDICARE

## 2024-07-11 LAB
RAD ONC ARIA COURSE FIRST TREATMENT DATE: NORMAL
RAD ONC ARIA COURSE ID: NORMAL
RAD ONC ARIA COURSE INTENT: NORMAL
RAD ONC ARIA COURSE LAST TREATMENT DATE: NORMAL
RAD ONC ARIA COURSE SESSION NUMBER: 30
RAD ONC ARIA COURSE START DATE: NORMAL
RAD ONC ARIA COURSE TREATMENT ELAPSED DAYS: 43
RAD ONC ARIA PLAN FRACTIONS TREATED TO DATE: 29
RAD ONC ARIA PLAN ID: NORMAL
RAD ONC ARIA PLAN PRESCRIBED DOSE PER FRACTION: 2 GY
RAD ONC ARIA PLAN PRIMARY REFERENCE POINT: NORMAL
RAD ONC ARIA PLAN TOTAL FRACTIONS PRESCRIBED: 35
RAD ONC ARIA PLAN TOTAL PRESCRIBED DOSE: 7000 CGY
RAD ONC ARIA REFERENCE POINT DOSAGE GIVEN TO DATE: 37.79 GY
RAD ONC ARIA REFERENCE POINT DOSAGE GIVEN TO DATE: 60 GY
RAD ONC ARIA REFERENCE POINT ID: NORMAL
RAD ONC ARIA REFERENCE POINT ID: NORMAL
RAD ONC ARIA REFERENCE POINT SESSION DOSAGE GIVEN: 1.26 GY
RAD ONC ARIA REFERENCE POINT SESSION DOSAGE GIVEN: 2 GY

## 2024-07-11 PROCEDURE — 77386 HC NTSTY MODUL RAD TX DLVR CPLX: CPT

## 2024-07-11 PROCEDURE — 77336 RADIATION PHYSICS CONSULT: CPT

## 2024-07-12 ENCOUNTER — HOSPITAL ENCOUNTER (OUTPATIENT)
Facility: HOSPITAL | Age: 69
Discharge: HOME | End: 2024-07-15
Attending: RADIOLOGY
Payer: MEDICARE

## 2024-07-12 ENCOUNTER — HOSPITAL ENCOUNTER (OUTPATIENT)
Facility: HOSPITAL | Age: 69
End: 2024-07-12
Payer: MEDICARE

## 2024-07-12 ENCOUNTER — TRANSCRIBE ORDERS (OUTPATIENT)
Facility: HOSPITAL | Age: 69
End: 2024-07-12

## 2024-07-12 DIAGNOSIS — R09.89 RHONCHI: ICD-10-CM

## 2024-07-12 DIAGNOSIS — R09.89 RHONCHI: Primary | ICD-10-CM

## 2024-07-12 LAB
RAD ONC ARIA COURSE FIRST TREATMENT DATE: NORMAL
RAD ONC ARIA COURSE ID: NORMAL
RAD ONC ARIA COURSE INTENT: NORMAL
RAD ONC ARIA COURSE LAST TREATMENT DATE: NORMAL
RAD ONC ARIA COURSE SESSION NUMBER: 31
RAD ONC ARIA COURSE START DATE: NORMAL
RAD ONC ARIA COURSE TREATMENT ELAPSED DAYS: 44
RAD ONC ARIA PLAN FRACTIONS TREATED TO DATE: 30
RAD ONC ARIA PLAN ID: NORMAL
RAD ONC ARIA PLAN PRESCRIBED DOSE PER FRACTION: 2 GY
RAD ONC ARIA PLAN PRIMARY REFERENCE POINT: NORMAL
RAD ONC ARIA PLAN TOTAL FRACTIONS PRESCRIBED: 35
RAD ONC ARIA PLAN TOTAL PRESCRIBED DOSE: 7000 CGY
RAD ONC ARIA REFERENCE POINT DOSAGE GIVEN TO DATE: 39.04 GY
RAD ONC ARIA REFERENCE POINT DOSAGE GIVEN TO DATE: 62 GY
RAD ONC ARIA REFERENCE POINT ID: NORMAL
RAD ONC ARIA REFERENCE POINT ID: NORMAL
RAD ONC ARIA REFERENCE POINT SESSION DOSAGE GIVEN: 1.26 GY
RAD ONC ARIA REFERENCE POINT SESSION DOSAGE GIVEN: 2 GY

## 2024-07-12 PROCEDURE — 77386 HC NTSTY MODUL RAD TX DLVR CPLX: CPT

## 2024-07-12 PROCEDURE — 71046 X-RAY EXAM CHEST 2 VIEWS: CPT

## 2024-07-15 ENCOUNTER — HOSPITAL ENCOUNTER (OUTPATIENT)
Facility: HOSPITAL | Age: 69
Discharge: HOME | End: 2024-07-18
Attending: RADIOLOGY
Payer: MEDICARE

## 2024-07-15 DIAGNOSIS — C32.1 CANCER OF SUPRAGLOTTIS (HCC): Primary | ICD-10-CM

## 2024-07-15 LAB
RAD ONC ARIA COURSE FIRST TREATMENT DATE: NORMAL
RAD ONC ARIA COURSE ID: NORMAL
RAD ONC ARIA COURSE INTENT: NORMAL
RAD ONC ARIA COURSE LAST TREATMENT DATE: NORMAL
RAD ONC ARIA COURSE SESSION NUMBER: 32
RAD ONC ARIA COURSE START DATE: NORMAL
RAD ONC ARIA COURSE TREATMENT ELAPSED DAYS: 47
RAD ONC ARIA PLAN FRACTIONS TREATED TO DATE: 31
RAD ONC ARIA PLAN ID: NORMAL
RAD ONC ARIA PLAN PRESCRIBED DOSE PER FRACTION: 2 GY
RAD ONC ARIA PLAN PRIMARY REFERENCE POINT: NORMAL
RAD ONC ARIA PLAN TOTAL FRACTIONS PRESCRIBED: 35
RAD ONC ARIA PLAN TOTAL PRESCRIBED DOSE: 7000 CGY
RAD ONC ARIA REFERENCE POINT DOSAGE GIVEN TO DATE: 40.3 GY
RAD ONC ARIA REFERENCE POINT DOSAGE GIVEN TO DATE: 64 GY
RAD ONC ARIA REFERENCE POINT ID: NORMAL
RAD ONC ARIA REFERENCE POINT ID: NORMAL
RAD ONC ARIA REFERENCE POINT SESSION DOSAGE GIVEN: 1.26 GY
RAD ONC ARIA REFERENCE POINT SESSION DOSAGE GIVEN: 2 GY

## 2024-07-15 PROCEDURE — 77386 HC NTSTY MODUL RAD TX DLVR CPLX: CPT

## 2024-07-15 NOTE — PROGRESS NOTES
6 hours as needed for Pain for up to 14 days. Max Daily Amount: 20 mLs, Disp: 1 each, Rfl: 1    amoxicillin (AMOXIL) 500 MG capsule, Take 1 capsule by mouth 3 times daily, Disp: , Rfl:     albuterol (PROVENTIL) (2.5 MG/3ML) 0.083% nebulizer solution, Take 3 mLs by nebulization every 6 hours as needed for Wheezing, Disp: , Rfl:     albuterol sulfate HFA (PROVENTIL;VENTOLIN;PROAIR) 108 (90 Base) MCG/ACT inhaler, Inhale 2 puffs into the lungs every 6 hours as needed for Wheezing, Disp: , Rfl:     fexofenadine (ALLEGRA) 180 MG tablet, Take 1 tablet by mouth daily, Disp: 90 tablet, Rfl: 1    Benzocaine-Menthol (CEPACOL) 6-10 MG LOZG lozenge, Take 1 lozenge by mouth every 2 hours as needed for Sore Throat, Disp: 30 lozenge, Rfl: 0    budesonide-formoterol (SYMBICORT) 160-4.5 MCG/ACT AERO, Inhale 2 puffs into the lungs in the morning and 2 puffs in the evening., Disp: 10.2 g, Rfl: 3    losartan (COZAAR) 50 MG tablet, Take 1 tablet by mouth daily, Disp: 30 tablet, Rfl: 3    levothyroxine (SYNTHROID) 150 MCG tablet, Take 1 tablet by mouth Daily, Disp: 30 tablet, Rfl: 3    levocetirizine (XYZAL) 5 MG tablet, Take 1 tablet by mouth daily, Disp: , Rfl:       ASSESSMENT & PLAN:   - Continue radiation treatment as planned  - .  - Treatment setup and plan reviewed. Port images/CBCT images reviewed. Appropriate laboratory work was reviewed.   - Treatment side effects and toxicities reviewed with the patient, and appropriate management was advised.   7/1: cont xrt aquaphor on neck.   7/8: cont xrt. Will skip Wednesdays this week and next week.  7/15 : cont xrt. Completes Friday . RTC 2 weeks.      Gokul Dietz MD

## 2024-07-16 ENCOUNTER — HOSPITAL ENCOUNTER (OUTPATIENT)
Facility: HOSPITAL | Age: 69
Discharge: HOME | End: 2024-07-19
Attending: RADIOLOGY
Payer: MEDICARE

## 2024-07-16 LAB
RAD ONC ARIA COURSE FIRST TREATMENT DATE: NORMAL
RAD ONC ARIA COURSE ID: NORMAL
RAD ONC ARIA COURSE INTENT: NORMAL
RAD ONC ARIA COURSE LAST TREATMENT DATE: NORMAL
RAD ONC ARIA COURSE SESSION NUMBER: 33
RAD ONC ARIA COURSE START DATE: NORMAL
RAD ONC ARIA COURSE TREATMENT ELAPSED DAYS: 48
RAD ONC ARIA PLAN FRACTIONS TREATED TO DATE: 32
RAD ONC ARIA PLAN ID: NORMAL
RAD ONC ARIA PLAN PRESCRIBED DOSE PER FRACTION: 2 GY
RAD ONC ARIA PLAN PRIMARY REFERENCE POINT: NORMAL
RAD ONC ARIA PLAN TOTAL FRACTIONS PRESCRIBED: 35
RAD ONC ARIA PLAN TOTAL PRESCRIBED DOSE: 7000 CGY
RAD ONC ARIA REFERENCE POINT DOSAGE GIVEN TO DATE: 41.56 GY
RAD ONC ARIA REFERENCE POINT DOSAGE GIVEN TO DATE: 66 GY
RAD ONC ARIA REFERENCE POINT ID: NORMAL
RAD ONC ARIA REFERENCE POINT ID: NORMAL
RAD ONC ARIA REFERENCE POINT SESSION DOSAGE GIVEN: 1.26 GY
RAD ONC ARIA REFERENCE POINT SESSION DOSAGE GIVEN: 2 GY

## 2024-07-16 PROCEDURE — 77386 HC NTSTY MODUL RAD TX DLVR CPLX: CPT

## 2024-07-17 ENCOUNTER — APPOINTMENT (OUTPATIENT)
Facility: HOSPITAL | Age: 69
End: 2024-07-17
Attending: RADIOLOGY
Payer: MEDICARE

## 2024-07-18 ENCOUNTER — HOSPITAL ENCOUNTER (OUTPATIENT)
Facility: HOSPITAL | Age: 69
Discharge: HOME | End: 2024-07-21
Attending: RADIOLOGY
Payer: MEDICARE

## 2024-07-18 LAB
RAD ONC ARIA COURSE FIRST TREATMENT DATE: NORMAL
RAD ONC ARIA COURSE ID: NORMAL
RAD ONC ARIA COURSE INTENT: NORMAL
RAD ONC ARIA COURSE LAST TREATMENT DATE: NORMAL
RAD ONC ARIA COURSE SESSION NUMBER: 34
RAD ONC ARIA COURSE START DATE: NORMAL
RAD ONC ARIA COURSE TREATMENT ELAPSED DAYS: 50
RAD ONC ARIA PLAN FRACTIONS TREATED TO DATE: 33
RAD ONC ARIA PLAN ID: NORMAL
RAD ONC ARIA PLAN PRESCRIBED DOSE PER FRACTION: 2 GY
RAD ONC ARIA PLAN PRIMARY REFERENCE POINT: NORMAL
RAD ONC ARIA PLAN TOTAL FRACTIONS PRESCRIBED: 35
RAD ONC ARIA PLAN TOTAL PRESCRIBED DOSE: 7000 CGY
RAD ONC ARIA REFERENCE POINT DOSAGE GIVEN TO DATE: 42.81 GY
RAD ONC ARIA REFERENCE POINT DOSAGE GIVEN TO DATE: 68 GY
RAD ONC ARIA REFERENCE POINT ID: NORMAL
RAD ONC ARIA REFERENCE POINT ID: NORMAL
RAD ONC ARIA REFERENCE POINT SESSION DOSAGE GIVEN: 1.26 GY
RAD ONC ARIA REFERENCE POINT SESSION DOSAGE GIVEN: 2 GY

## 2024-07-18 PROCEDURE — 77386 HC NTSTY MODUL RAD TX DLVR CPLX: CPT

## 2024-07-19 ENCOUNTER — APPOINTMENT (OUTPATIENT)
Facility: HOSPITAL | Age: 69
DRG: 871 | End: 2024-07-19
Payer: MEDICARE

## 2024-07-19 ENCOUNTER — CLINICAL DOCUMENTATION (OUTPATIENT)
Facility: HOSPITAL | Age: 69
End: 2024-07-19

## 2024-07-19 ENCOUNTER — HOSPITAL ENCOUNTER (INPATIENT)
Facility: HOSPITAL | Age: 69
LOS: 7 days | Discharge: HOME HEALTH | DRG: 871 | End: 2024-07-26
Attending: EMERGENCY MEDICINE | Admitting: INTERNAL MEDICINE
Payer: MEDICARE

## 2024-07-19 ENCOUNTER — APPOINTMENT (OUTPATIENT)
Facility: HOSPITAL | Age: 69
DRG: 871 | End: 2024-07-19
Attending: EMERGENCY MEDICINE
Payer: MEDICARE

## 2024-07-19 ENCOUNTER — HOSPITAL ENCOUNTER (OUTPATIENT)
Facility: HOSPITAL | Age: 69
Discharge: HOME | End: 2024-07-22
Attending: RADIOLOGY
Payer: MEDICARE

## 2024-07-19 DIAGNOSIS — I26.99 BILATERAL PULMONARY EMBOLISM (HCC): Primary | ICD-10-CM

## 2024-07-19 DIAGNOSIS — I26.99 OTHER PULMONARY EMBOLISM WITHOUT ACUTE COR PULMONALE, UNSPECIFIED CHRONICITY (HCC): ICD-10-CM

## 2024-07-19 PROBLEM — J69.0 ASPIRATION PNEUMONIA OF RIGHT LOWER LOBE DUE TO VOMIT (HCC): Status: ACTIVE | Noted: 2024-07-19

## 2024-07-19 LAB
ALBUMIN SERPL-MCNC: 2.6 G/DL (ref 3.5–5)
ALBUMIN/GLOB SERPL: 0.4 (ref 1.1–2.2)
ALP SERPL-CCNC: 104 U/L (ref 45–117)
ALT SERPL-CCNC: 34 U/L (ref 12–78)
ANION GAP SERPL CALC-SCNC: 7 MMOL/L (ref 5–15)
APTT PPP: 30.6 SEC (ref 21.2–34.1)
AST SERPL W P-5'-P-CCNC: 20 U/L (ref 15–37)
AST SERPL W P-5'-P-CCNC: ABNORMAL U/L (ref 15–37)
BASOPHILS # BLD: 0 K/UL (ref 0–0.1)
BASOPHILS NFR BLD: 0 % (ref 0–1)
BILIRUB SERPL-MCNC: 1.2 MG/DL (ref 0.2–1)
BUN SERPL-MCNC: 43 MG/DL (ref 6–20)
BUN/CREAT SERPL: 32 (ref 12–20)
CA-I BLD-MCNC: 9.7 MG/DL (ref 8.5–10.1)
CHLORIDE SERPL-SCNC: 111 MMOL/L (ref 97–108)
CO2 SERPL-SCNC: 23 MMOL/L (ref 21–32)
CREAT SERPL-MCNC: 1.33 MG/DL (ref 0.55–1.02)
CRP SERPL-MCNC: 14.8 MG/DL (ref 0–0.3)
DIFFERENTIAL METHOD BLD: ABNORMAL
ECHO BSA: 2.01 M2
EKG ATRIAL RATE: 105 BPM
EKG DIAGNOSIS: NORMAL
EKG P AXIS: 71 DEGREES
EKG P-R INTERVAL: 116 MS
EKG Q-T INTERVAL: 332 MS
EKG QRS DURATION: 82 MS
EKG QTC CALCULATION (BAZETT): 438 MS
EKG R AXIS: 81 DEGREES
EKG T AXIS: 74 DEGREES
EKG VENTRICULAR RATE: 105 BPM
EOSINOPHIL # BLD: 0.1 K/UL (ref 0–0.4)
EOSINOPHIL NFR BLD: 1 % (ref 0–7)
ERYTHROCYTE [DISTWIDTH] IN BLOOD BY AUTOMATED COUNT: 16.6 % (ref 11.5–14.5)
ERYTHROCYTE [DISTWIDTH] IN BLOOD BY AUTOMATED COUNT: 17.2 % (ref 11.5–14.5)
GLOBULIN SER CALC-MCNC: 6.4 G/DL (ref 2–4)
GLUCOSE SERPL-MCNC: 111 MG/DL (ref 65–100)
HCT VFR BLD AUTO: 29.7 % (ref 35–47)
HCT VFR BLD AUTO: 36.4 % (ref 35–47)
HGB BLD-MCNC: 11.8 G/DL (ref 11.5–16)
HGB BLD-MCNC: 9.4 G/DL (ref 11.5–16)
IMM GRANULOCYTES # BLD AUTO: 0 K/UL
IMM GRANULOCYTES NFR BLD AUTO: 0 %
INR PPP: 1.6 (ref 0.9–1.1)
LYMPHOCYTES # BLD: 0.5 K/UL (ref 0.8–3.5)
LYMPHOCYTES NFR BLD: 6 % (ref 12–49)
MCH RBC QN AUTO: 30 PG (ref 26–34)
MCH RBC QN AUTO: 30.5 PG (ref 26–34)
MCHC RBC AUTO-ENTMCNC: 31.6 G/DL (ref 30–36.5)
MCHC RBC AUTO-ENTMCNC: 32.4 G/DL (ref 30–36.5)
MCV RBC AUTO: 94.1 FL (ref 80–99)
MCV RBC AUTO: 94.9 FL (ref 80–99)
METAMYELOCYTES NFR BLD MANUAL: 3 %
MONOCYTES # BLD: 0.5 K/UL (ref 0–1)
MONOCYTES NFR BLD: 6 % (ref 5–13)
MYELOCYTES NFR BLD MANUAL: 1 %
NEUTS BAND NFR BLD MANUAL: 6 % (ref 0–6)
NEUTS SEG # BLD: 7.6 K/UL (ref 1.8–8)
NEUTS SEG NFR BLD: 77 % (ref 32–75)
NRBC # BLD: 0.06 K/UL (ref 0–0.01)
NRBC # BLD: 0.11 K/UL (ref 0–0.01)
NRBC BLD-RTO: 0.7 PER 100 WBC
NRBC BLD-RTO: 1.2 PER 100 WBC
PLATELET # BLD AUTO: 166 K/UL (ref 150–400)
PLATELET # BLD AUTO: 242 K/UL (ref 150–400)
PLATELET COMMENT: ABNORMAL
PMV BLD AUTO: 11.1 FL (ref 8.9–12.9)
PMV BLD AUTO: 12.3 FL (ref 8.9–12.9)
POTASSIUM SERPL-SCNC: 3.6 MMOL/L (ref 3.5–5.1)
POTASSIUM SERPL-SCNC: ABNORMAL MMOL/L (ref 3.5–5.1)
PROCALCITONIN SERPL-MCNC: 0.09 NG/ML
PROT SERPL-MCNC: 9 G/DL (ref 6.4–8.2)
PROTHROMBIN TIME: 19.5 SEC (ref 11.9–14.6)
RAD ONC ARIA COURSE FIRST TREATMENT DATE: NORMAL
RAD ONC ARIA COURSE ID: NORMAL
RAD ONC ARIA COURSE INTENT: NORMAL
RAD ONC ARIA COURSE LAST TREATMENT DATE: NORMAL
RAD ONC ARIA COURSE SESSION NUMBER: 35
RAD ONC ARIA COURSE START DATE: NORMAL
RAD ONC ARIA COURSE TREATMENT ELAPSED DAYS: 51
RAD ONC ARIA PLAN FRACTIONS TREATED TO DATE: 34
RAD ONC ARIA PLAN ID: NORMAL
RAD ONC ARIA PLAN PRESCRIBED DOSE PER FRACTION: 2 GY
RAD ONC ARIA PLAN PRIMARY REFERENCE POINT: NORMAL
RAD ONC ARIA PLAN TOTAL FRACTIONS PRESCRIBED: 35
RAD ONC ARIA PLAN TOTAL PRESCRIBED DOSE: 7000 CGY
RAD ONC ARIA REFERENCE POINT DOSAGE GIVEN TO DATE: 44.07 GY
RAD ONC ARIA REFERENCE POINT DOSAGE GIVEN TO DATE: 70 GY
RAD ONC ARIA REFERENCE POINT ID: NORMAL
RAD ONC ARIA REFERENCE POINT ID: NORMAL
RAD ONC ARIA REFERENCE POINT SESSION DOSAGE GIVEN: 1.26 GY
RAD ONC ARIA REFERENCE POINT SESSION DOSAGE GIVEN: 2 GY
RBC # BLD AUTO: 3.13 M/UL (ref 3.8–5.2)
RBC # BLD AUTO: 3.87 M/UL (ref 3.8–5.2)
RBC MORPH BLD: ABNORMAL
SODIUM SERPL-SCNC: 141 MMOL/L (ref 136–145)
THERAPEUTIC RANGE: NORMAL SEC (ref 82–109)
UFH PPP CHRO-ACNC: <0.1 IU/ML
WBC # BLD AUTO: 8.9 K/UL (ref 3.6–11)
WBC # BLD AUTO: 9.1 K/UL (ref 3.6–11)

## 2024-07-19 PROCEDURE — 93005 ELECTROCARDIOGRAM TRACING: CPT | Performed by: EMERGENCY MEDICINE

## 2024-07-19 PROCEDURE — 71045 X-RAY EXAM CHEST 1 VIEW: CPT

## 2024-07-19 PROCEDURE — 85027 COMPLETE CBC AUTOMATED: CPT

## 2024-07-19 PROCEDURE — 94640 AIRWAY INHALATION TREATMENT: CPT

## 2024-07-19 PROCEDURE — 77336 RADIATION PHYSICS CONSULT: CPT

## 2024-07-19 PROCEDURE — 80053 COMPREHEN METABOLIC PANEL: CPT

## 2024-07-19 PROCEDURE — 96374 THER/PROPH/DIAG INJ IV PUSH: CPT

## 2024-07-19 PROCEDURE — 93971 EXTREMITY STUDY: CPT

## 2024-07-19 PROCEDURE — 6370000000 HC RX 637 (ALT 250 FOR IP): Performed by: INTERNAL MEDICINE

## 2024-07-19 PROCEDURE — 71275 CT ANGIOGRAPHY CHEST: CPT

## 2024-07-19 PROCEDURE — 6360000002 HC RX W HCPCS: Performed by: STUDENT IN AN ORGANIZED HEALTH CARE EDUCATION/TRAINING PROGRAM

## 2024-07-19 PROCEDURE — 84450 TRANSFERASE (AST) (SGOT): CPT

## 2024-07-19 PROCEDURE — 84132 ASSAY OF SERUM POTASSIUM: CPT

## 2024-07-19 PROCEDURE — 87040 BLOOD CULTURE FOR BACTERIA: CPT

## 2024-07-19 PROCEDURE — 84145 PROCALCITONIN (PCT): CPT

## 2024-07-19 PROCEDURE — 1100000000 HC RM PRIVATE

## 2024-07-19 PROCEDURE — 6360000002 HC RX W HCPCS: Performed by: INTERNAL MEDICINE

## 2024-07-19 PROCEDURE — 85610 PROTHROMBIN TIME: CPT

## 2024-07-19 PROCEDURE — 85520 HEPARIN ASSAY: CPT

## 2024-07-19 PROCEDURE — 6360000004 HC RX CONTRAST MEDICATION: Performed by: STUDENT IN AN ORGANIZED HEALTH CARE EDUCATION/TRAINING PROGRAM

## 2024-07-19 PROCEDURE — 77386 HC NTSTY MODUL RAD TX DLVR CPLX: CPT

## 2024-07-19 PROCEDURE — 2500000003 HC RX 250 WO HCPCS: Performed by: INTERNAL MEDICINE

## 2024-07-19 PROCEDURE — 6370000000 HC RX 637 (ALT 250 FOR IP): Performed by: EMERGENCY MEDICINE

## 2024-07-19 PROCEDURE — 85025 COMPLETE CBC W/AUTO DIFF WBC: CPT

## 2024-07-19 PROCEDURE — 74177 CT ABD & PELVIS W/CONTRAST: CPT

## 2024-07-19 PROCEDURE — 2580000003 HC RX 258: Performed by: INTERNAL MEDICINE

## 2024-07-19 PROCEDURE — 85730 THROMBOPLASTIN TIME PARTIAL: CPT

## 2024-07-19 PROCEDURE — 2580000003 HC RX 258: Performed by: EMERGENCY MEDICINE

## 2024-07-19 PROCEDURE — 94761 N-INVAS EAR/PLS OXIMETRY MLT: CPT

## 2024-07-19 PROCEDURE — 86140 C-REACTIVE PROTEIN: CPT

## 2024-07-19 PROCEDURE — 36415 COLL VENOUS BLD VENIPUNCTURE: CPT

## 2024-07-19 PROCEDURE — 6360000002 HC RX W HCPCS: Performed by: EMERGENCY MEDICINE

## 2024-07-19 PROCEDURE — 99285 EMERGENCY DEPT VISIT HI MDM: CPT

## 2024-07-19 RX ORDER — SODIUM CHLORIDE 0.9 % (FLUSH) 0.9 %
5-40 SYRINGE (ML) INJECTION EVERY 12 HOURS SCHEDULED
Status: DISCONTINUED | OUTPATIENT
Start: 2024-07-19 | End: 2024-07-26 | Stop reason: HOSPADM

## 2024-07-19 RX ORDER — BENZONATATE 100 MG/1
100 CAPSULE ORAL 3 TIMES DAILY PRN
COMMUNITY

## 2024-07-19 RX ORDER — GUAIFENESIN 600 MG/1
600 TABLET, EXTENDED RELEASE ORAL 2 TIMES DAILY
Status: DISCONTINUED | OUTPATIENT
Start: 2024-07-19 | End: 2024-07-24

## 2024-07-19 RX ORDER — ACETAMINOPHEN 650 MG/1
650 SUPPOSITORY RECTAL EVERY 6 HOURS PRN
Status: DISCONTINUED | OUTPATIENT
Start: 2024-07-19 | End: 2024-07-26 | Stop reason: HOSPADM

## 2024-07-19 RX ORDER — HEPARIN SODIUM 1000 [USP'U]/ML
40 INJECTION, SOLUTION INTRAVENOUS; SUBCUTANEOUS PRN
Status: DISCONTINUED | OUTPATIENT
Start: 2024-07-19 | End: 2024-07-22

## 2024-07-19 RX ORDER — SODIUM CHLORIDE 9 MG/ML
INJECTION, SOLUTION INTRAVENOUS PRN
Status: DISCONTINUED | OUTPATIENT
Start: 2024-07-19 | End: 2024-07-26 | Stop reason: HOSPADM

## 2024-07-19 RX ORDER — HEPARIN SODIUM 1000 [USP'U]/ML
80 INJECTION, SOLUTION INTRAVENOUS; SUBCUTANEOUS ONCE
Status: COMPLETED | OUTPATIENT
Start: 2024-07-19 | End: 2024-07-19

## 2024-07-19 RX ORDER — DEXTROSE MONOHYDRATE, SODIUM CHLORIDE, AND POTASSIUM CHLORIDE 50; 1.49; 4.5 G/1000ML; G/1000ML; G/1000ML
INJECTION, SOLUTION INTRAVENOUS CONTINUOUS
Status: DISCONTINUED | OUTPATIENT
Start: 2024-07-19 | End: 2024-07-24

## 2024-07-19 RX ORDER — ONDANSETRON 4 MG/1
4 TABLET, ORALLY DISINTEGRATING ORAL EVERY 8 HOURS PRN
Status: DISCONTINUED | OUTPATIENT
Start: 2024-07-19 | End: 2024-07-26 | Stop reason: HOSPADM

## 2024-07-19 RX ORDER — HEPARIN SODIUM 1000 [USP'U]/ML
80 INJECTION, SOLUTION INTRAVENOUS; SUBCUTANEOUS PRN
Status: DISCONTINUED | OUTPATIENT
Start: 2024-07-19 | End: 2024-07-22

## 2024-07-19 RX ORDER — ONDANSETRON 2 MG/ML
4 INJECTION INTRAMUSCULAR; INTRAVENOUS EVERY 6 HOURS PRN
Status: DISCONTINUED | OUTPATIENT
Start: 2024-07-19 | End: 2024-07-26 | Stop reason: HOSPADM

## 2024-07-19 RX ORDER — BUDESONIDE AND FORMOTEROL FUMARATE DIHYDRATE 160; 4.5 UG/1; UG/1
2 AEROSOL RESPIRATORY (INHALATION)
Status: DISCONTINUED | OUTPATIENT
Start: 2024-07-19 | End: 2024-07-26 | Stop reason: HOSPADM

## 2024-07-19 RX ORDER — POLYETHYLENE GLYCOL 3350 17 G/17G
17 POWDER, FOR SOLUTION ORAL DAILY PRN
Status: DISCONTINUED | OUTPATIENT
Start: 2024-07-19 | End: 2024-07-26 | Stop reason: HOSPADM

## 2024-07-19 RX ORDER — LEVOTHYROXINE SODIUM 0.07 MG/1
150 TABLET ORAL DAILY
Status: DISCONTINUED | OUTPATIENT
Start: 2024-07-20 | End: 2024-07-26 | Stop reason: HOSPADM

## 2024-07-19 RX ORDER — SODIUM CHLORIDE 0.9 % (FLUSH) 0.9 %
5-40 SYRINGE (ML) INJECTION PRN
Status: DISCONTINUED | OUTPATIENT
Start: 2024-07-19 | End: 2024-07-26 | Stop reason: HOSPADM

## 2024-07-19 RX ORDER — CETIRIZINE HYDROCHLORIDE 10 MG/1
10 TABLET ORAL DAILY
Status: DISCONTINUED | OUTPATIENT
Start: 2024-07-20 | End: 2024-07-26 | Stop reason: HOSPADM

## 2024-07-19 RX ORDER — HEPARIN SODIUM 10000 [USP'U]/100ML
5-30 INJECTION, SOLUTION INTRAVENOUS CONTINUOUS
Status: DISCONTINUED | OUTPATIENT
Start: 2024-07-19 | End: 2024-07-22

## 2024-07-19 RX ORDER — IPRATROPIUM BROMIDE AND ALBUTEROL SULFATE 2.5; .5 MG/3ML; MG/3ML
1 SOLUTION RESPIRATORY (INHALATION)
Status: DISCONTINUED | OUTPATIENT
Start: 2024-07-19 | End: 2024-07-21

## 2024-07-19 RX ORDER — ACETAMINOPHEN 325 MG/1
650 TABLET ORAL EVERY 6 HOURS PRN
Status: DISCONTINUED | OUTPATIENT
Start: 2024-07-19 | End: 2024-07-26 | Stop reason: HOSPADM

## 2024-07-19 RX ORDER — MORPHINE SULFATE 4 MG/ML
4 INJECTION, SOLUTION INTRAMUSCULAR; INTRAVENOUS
Status: COMPLETED | OUTPATIENT
Start: 2024-07-19 | End: 2024-07-19

## 2024-07-19 RX ORDER — 0.9 % SODIUM CHLORIDE 0.9 %
1000 INTRAVENOUS SOLUTION INTRAVENOUS
Status: COMPLETED | OUTPATIENT
Start: 2024-07-19 | End: 2024-07-19

## 2024-07-19 RX ORDER — 0.9 % SODIUM CHLORIDE 0.9 %
1000 INTRAVENOUS SOLUTION INTRAVENOUS ONCE
Status: DISCONTINUED | OUTPATIENT
Start: 2024-07-19 | End: 2024-07-26 | Stop reason: HOSPADM

## 2024-07-19 RX ADMIN — SODIUM CHLORIDE 1000 ML: 9 INJECTION, SOLUTION INTRAVENOUS at 13:24

## 2024-07-19 RX ADMIN — IOPAMIDOL 100 ML: 755 INJECTION, SOLUTION INTRAVENOUS at 18:27

## 2024-07-19 RX ADMIN — IPRATROPIUM BROMIDE AND ALBUTEROL SULFATE 1 DOSE: .5; 3 SOLUTION RESPIRATORY (INHALATION) at 20:57

## 2024-07-19 RX ADMIN — PIPERACILLIN AND TAZOBACTAM 4500 MG: 4; .5 INJECTION, POWDER, LYOPHILIZED, FOR SOLUTION INTRAVENOUS at 19:29

## 2024-07-19 RX ADMIN — POTASSIUM CHLORIDE, DEXTROSE MONOHYDRATE AND SODIUM CHLORIDE: 150; 5; 450 INJECTION, SOLUTION INTRAVENOUS at 19:20

## 2024-07-19 RX ADMIN — HEPARIN SODIUM 7300 UNITS: 1000 INJECTION INTRAVENOUS; SUBCUTANEOUS at 19:23

## 2024-07-19 RX ADMIN — Medication 2 PUFF: at 20:56

## 2024-07-19 RX ADMIN — HEPARIN SODIUM 18 UNITS/KG/HR: 10000 INJECTION, SOLUTION INTRAVENOUS at 19:23

## 2024-07-19 RX ADMIN — NYSTATIN 500000 UNITS: 100000 SUSPENSION ORAL at 14:17

## 2024-07-19 RX ADMIN — MORPHINE SULFATE 4 MG: 4 INJECTION, SOLUTION INTRAMUSCULAR; INTRAVENOUS at 14:16

## 2024-07-19 ASSESSMENT — LIFESTYLE VARIABLES
HOW MANY STANDARD DRINKS CONTAINING ALCOHOL DO YOU HAVE ON A TYPICAL DAY: PATIENT DOES NOT DRINK
HOW OFTEN DO YOU HAVE A DRINK CONTAINING ALCOHOL: NEVER

## 2024-07-19 ASSESSMENT — PAIN SCALES - GENERAL
PAINLEVEL_OUTOF10: 10
PAINLEVEL_OUTOF10: 7

## 2024-07-19 ASSESSMENT — PAIN DESCRIPTION - DESCRIPTORS: DESCRIPTORS: SORE

## 2024-07-19 ASSESSMENT — PAIN - FUNCTIONAL ASSESSMENT: PAIN_FUNCTIONAL_ASSESSMENT: 0-10

## 2024-07-19 ASSESSMENT — PAIN DESCRIPTION - LOCATION: LOCATION: THROAT

## 2024-07-19 NOTE — ED TRIAGE NOTES
Daughter reports that patient has been having difficulty eating and drinking due to throat pain from radiation treatment, noticed right leg swelling, and odor from trach. Reports that last week the patient was diagnosed with pneumonia, is having increased mucus from trach as well.     Pt c/o throat pain, right leg pain.  Hx throat cancer, HTN

## 2024-07-19 NOTE — H&P
.    Hospitalist Admission Note    NAME:   April Mayo   : 1955   MRN: [id number]     Date/Time: 2024 6:14 PM    Patient PCP: Lyly Bowman MD    ______________________________________________________________________  Given the patient's current clinical presentation, I have a high level of concern for decompensation if discharged from the emergency department.  Complex decision making was performed, which includes reviewing the patient's available past medical records, laboratory results, and x-ray films.       My assessment of this patient's clinical condition and my plan of care is as follows.    Assessment / Plan:    SIRS  Low-grade fever with mild tachycardia and tachypnea without significant hypoxia.  No significant leukocytosis however left shift on differential is noted.  Patient is however significantly immunocompromised with recent chemotherapy and ongoing radiation therapy currently.  Likely secondary to right-sided pneumonia as evaluated on chest x-ray.  Obtain blood cultures x 2.    Aspiration pneumonia  As endorsed by patient that she may be choking on her oral intake.  Start patient on IV Zosyn.  Start patient on DuoNebs as well as pulmonology consultation.  Continue Symbicort, Zyrtec's and start on Mucinex.    Severe acute right-sided DVT  Per IR, CT abdomen/pelvis with IV dye pending.  Start patient on heparin GTT.    Mild CHRISTEN  Creatinine mildly elevated to 1.33.  Baseline creatinine of 0.76 noted.  Start on gentle IV fluids.    Hypertension  Hold patient's Cozaar given mild CHRISTEN.  Currently normotensive.    Full CODE STATUS      Medical Decision Making:   I personally reviewed labs: CBC, CMP  I personally reviewed imaging: Chest x-ray, right lower extremity PVL  I personally reviewed EKG: Twelve-lead EKG  Toxic drug monitoring:     Discussed case with: ED provider. After discussion I am in agreement that acuity of patient's medical condition necessitates hospital  %    Monocytes % 6 5 - 13 %    Eosinophils % 1 0 - 7 %    Basophils % 0 0 - 1 %    Metamyelocytes 3 (H) 0 %    Myelocytes 1 (H) 0 %    Immature Granulocytes % 0 %    Neutrophils Absolute 7.6 1.8 - 8.0 K/UL    Lymphocytes Absolute 0.5 (L) 0.8 - 3.5 K/UL    Monocytes Absolute 0.5 0.0 - 1.0 K/UL    Eosinophils Absolute 0.1 0.0 - 0.4 K/UL    Basophils Absolute 0.0 0.0 - 0.1 K/UL    Immature Granulocytes Absolute 0.0 K/UL    Differential Type Manual      Platelet Comment Giant Platelets      RBC Comment Normocytic, Normochromic     Comprehensive Metabolic Panel    Collection Time: 07/19/24 11:51 AM   Result Value Ref Range    Sodium 141 136 - 145 mmol/L    Potassium Hemolyzed, Recollection Recommended 3.5 - 5.1 mmol/L    Chloride 111 (H) 97 - 108 mmol/L    CO2 23 21 - 32 mmol/L    Anion Gap 7 5 - 15 mmol/L    Glucose 111 (H) 65 - 100 mg/dL    BUN 43 (H) 6 - 20 mg/dL    Creatinine 1.33 (H) 0.55 - 1.02 mg/dL    BUN/Creatinine Ratio 32 (H) 12 - 20      Est, Glom Filt Rate 43 (L) >60 ml/min/1.73m2    Calcium 9.7 8.5 - 10.1 mg/dL    Total Bilirubin 1.2 (H) 0.2 - 1.0 mg/dL    AST Hemolyzed, Recollection Recommended 15 - 37 U/L    ALT 34 12 - 78 U/L    Alk Phosphatase 104 45 - 117 U/L    Total Protein 9.0 (H) 6.4 - 8.2 g/dL    Albumin 2.6 (L) 3.5 - 5.0 g/dL    Globulin 6.4 (H) 2.0 - 4.0 g/dL    Albumin/Globulin Ratio 0.4 (L) 1.1 - 2.2     Potassium    Collection Time: 07/19/24  2:16 PM   Result Value Ref Range    Potassium 3.6 3.5 - 5.1 mmol/L   AST    Collection Time: 07/19/24  2:16 PM   Result Value Ref Range    AST 20 15 - 37 U/L   Vascular duplex lower extremity venous right    Collection Time: 07/19/24  3:18 PM   Result Value Ref Range    Body Surface Area 2.01 m2         Vascular duplex lower extremity venous right    Result Date: 7/19/2024    Acute non-occlusive deep vein thrombosis in the right external iliac vein.   Acute non-occlusive deep vein thrombosis in the right common femoral vein.   Acute occlusive deep vein

## 2024-07-19 NOTE — PROGRESS NOTES
[place], [place]      RADIATION ONCOLOGY CLINICAL END OF TREATMENT NOTE    Encounter Date: 07/19/24   Patient Name: April Mayo  YOB: 1955  Medical Record Number: [id number]    DIAGNOSIS:   No diagnosis found.scc supraglottic layrnx. T3N0M0  STAGING:   Cancer Staging   No matching staging information was found for the patient.  AJCC Staging has been reviewed    ASSESSMENT:   Definitive chemo/xrt      TREATMENT SUMMARY:     Treatment Site Modality Dose/Fx (cGy) #Fx Total Dose (cGy) Start Date End Date Elapsed Days   Larynx/ H+N IMRT [phone] 5/30/2024 7/19/2024 51                 CONCURRENT THERAPY: Chemotherapy    TREATMENT RESPONSE:  Treatment completed as planned      MEDICATIONS:     Current Outpatient Medications:     acetaminophen-codeine 120-12 MG/5ML solution, Take 5 mLs by mouth every 6 hours as needed for Pain for up to 14 days. Max Daily Amount: 20 mLs, Disp: 1 each, Rfl: 1    amoxicillin (AMOXIL) 500 MG capsule, Take 1 capsule by mouth 3 times daily, Disp: , Rfl:     albuterol (PROVENTIL) (2.5 MG/3ML) 0.083% nebulizer solution, Take 3 mLs by nebulization every 6 hours as needed for Wheezing, Disp: , Rfl:     albuterol sulfate HFA (PROVENTIL;VENTOLIN;PROAIR) 108 (90 Base) MCG/ACT inhaler, Inhale 2 puffs into the lungs every 6 hours as needed for Wheezing, Disp: , Rfl:     fexofenadine (ALLEGRA) 180 MG tablet, Take 1 tablet by mouth daily, Disp: 90 tablet, Rfl: 1    Benzocaine-Menthol (CEPACOL) 6-10 MG LOZG lozenge, Take 1 lozenge by mouth every 2 hours as needed for Sore Throat, Disp: 30 lozenge, Rfl: 0    budesonide-formoterol (SYMBICORT) 160-4.5 MCG/ACT AERO, Inhale 2 puffs into the lungs in the morning and 2 puffs in the evening., Disp: 10.2 g, Rfl: 3    losartan (COZAAR) 50 MG tablet, Take 1 tablet by mouth daily, Disp: 30 tablet, Rfl: 3    levothyroxine (SYNTHROID) 150 MCG tablet, Take 1 tablet by mouth Daily, Disp: 30 tablet, Rfl: 3

## 2024-07-19 NOTE — ED NOTES
ED TO INPATIENT SBAR HANDOFF    Patient Name: April Mayo   Preferred Name: April  : 1955  69 y.o.   Family/Caregiver Present: no   Code Status Order: Full Code  PO Status: NPO:  Telemetry Order:   C-SSRS: Risk of Suicide: No Risk  Sitter no   Restraints:  Sepsis Risk Score    Situation  Chief Complaint   Patient presents with    Dysphagia    Sore Throat    Leg Swelling     Brief Description of Patient's Condition: Pt having difficulty swallowing r/t radiation  Mental Status: oriented, alert, and coherent  Arrived from:Home  Imaging:   Vascular duplex lower extremity venous right   Final Result      XR CHEST PORTABLE   Final Result   Increased airspace disease is present in the right base.        Electronically signed by ARMANDO TONY      CT ABDOMEN PELVIS W IV CONTRAST Additional Contrast? None    (Results Pending)   CTA CHEST W WO CONTRAST    (Results Pending)     Abnormal labs:   Abnormal Labs Reviewed   CBC WITH AUTO DIFFERENTIAL - Abnormal; Notable for the following components:       Result Value    RDW 17.2 (*)     Nucleated RBCs 1.2 (*)     nRBC 0.11 (*)     Neutrophils % 77 (*)     Lymphocytes % 6 (*)     Metamyelocytes 3 (*)     Myelocytes 1 (*)     Lymphocytes Absolute 0.5 (*)     All other components within normal limits   COMPREHENSIVE METABOLIC PANEL - Abnormal; Notable for the following components:    Chloride 111 (*)     Glucose 111 (*)     BUN 43 (*)     Creatinine 1.33 (*)     BUN/Creatinine Ratio 32 (*)     Est, Glom Filt Rate 43 (*)     Total Bilirubin 1.2 (*)     Total Protein 9.0 (*)     Albumin 2.6 (*)     Globulin 6.4 (*)     Albumin/Globulin Ratio 0.4 (*)     All other components within normal limits       Background  Allergies:   Allergies   Allergen Reactions    Codeine Other (See Comments)     NAUSEA, VOMITING      History:   Past Medical History:   Diagnosis Date    Arthritis     Graves disease     Hypertension        Assessment  Vitals: MEWS Score: 1  Level of

## 2024-07-19 NOTE — ED NOTES
RRT called to evaluate patient's trach, make more comfortable for patient due to radiation to throat area.

## 2024-07-19 NOTE — PROGRESS NOTES
Heparin Infusion Initiation  April Mayo is a 69 y.o. female starting heparin for:  DVT  Heparin dosing: order for weight based protocol  Initial Dosing Weight: 90.7 kg (Recorded body weight)  Recent Labs     07/19/24  1151      HGB 11.8     Factor Xa inhibitor/LMWH use within the past 72 hours? No  If yes, date and time of last administration: N/A  Hypertriglyceridemia (> 690 mg/dL) or hyperbilirubinemia (> 37 mg/dL conjugated bilirubin, >14 mg/dL unconjugated bilirubin) present? No  Recent Labs     07/19/24  1151   BILITOT 1.2*       Assessment/Plan:   Heparin to be monitored using anti-Xa for duration of therapy  Initial bolus ordered: Yes  Starting rate:  18 unit/kg/hr  PRN boluses entered: Yes

## 2024-07-19 NOTE — ED PROVIDER NOTES
[facility] EMERGENCY DEPT  EMERGENCY DEPARTMENT HISTORY AND PHYSICAL EXAM      Date: 7/19/2024  Patient Name: April Mayo  MRN: [id number]  Birthdate 1955  Date of evaluation: 7/19/2024  Provider: Collins Chacon MD   Note Started: 12:49 PM EDT 7/19/24    HISTORY OF PRESENT ILLNESS     Chief Complaint   Patient presents with    Dysphagia    Sore Throat    Leg Swelling       History Provided By: Patient    HPI: April Mayo is a 69 y.o. female with known pmhx of treatment for throat CA with radiation presents with painful swalllowing at this time.  She is taking in fluids but states its too painful to swallow.  She also states that she has new RLL swelling as of the past few days and she was sent to the ED to assess for Dvt.    She specifically denies any fevers, chills, nausea, vomiting, chest pain, shortness of breath, headache, rash, diarrhea, sweating or weight loss.     PAST MEDICAL HISTORY   Past Medical History:  Past Medical History:   Diagnosis Date    Arthritis     Graves disease     Hypertension        Past Surgical History:  Past Surgical History:   Procedure Laterality Date    BUNIONECTOMY      HYSTERECTOMY (CERVIX STATUS UNKNOWN)      HYSTERECTOMY, VAGINAL      IR PORT PLACEMENT EQUAL OR GREATER THAN 5 YEARS  5/3/2024    IR PORT PLACEMENT EQUAL OR GREATER THAN 5 YEARS 5/3/2024 Kanchan Gibbs PA [facility] RAD ANGIO IR    OTHER SURGICAL HISTORY      eye surgery for graves disease    TRACHEOSTOMY N/A 3/14/2024    AWAKE URGENT TRACHEOSTOMY AND BIOPSY (LOCAL WITH ZAIDA STANDBY) performed by Vaishali Pringle MD at [facility] MAIN OR    UPPER GASTROINTESTINAL ENDOSCOPY N/A 7/23/2024    ESOPHAGOGASTRODUODENOSCOPY PERCUTANEOUS ENDOSCOPIC GASTROSTOMY TUBE INSERTION performed by Geronimo Villeda MD at [facility] ENDOSCOPY       Family History:  Family History   Problem Relation Age of Onset    Heart Failure Mother     Rheum Arthritis Sister     Stroke Sister     Thyroid Disease Sister        Social History:  Social History     Tobacco

## 2024-07-20 LAB
ANION GAP SERPL CALC-SCNC: 8 MMOL/L (ref 5–15)
BASOPHILS # BLD: 0 K/UL (ref 0–0.1)
BASOPHILS NFR BLD: 0 % (ref 0–1)
BUN SERPL-MCNC: 40 MG/DL (ref 6–20)
BUN/CREAT SERPL: 32 (ref 12–20)
CA-I BLD-MCNC: 9 MG/DL (ref 8.5–10.1)
CHLORIDE SERPL-SCNC: 115 MMOL/L (ref 97–108)
CO2 SERPL-SCNC: 24 MMOL/L (ref 21–32)
CREAT SERPL-MCNC: 1.24 MG/DL (ref 0.55–1.02)
DIFFERENTIAL METHOD BLD: ABNORMAL
EOSINOPHIL # BLD: 0 K/UL (ref 0–0.4)
EOSINOPHIL NFR BLD: 0 % (ref 0–7)
ERYTHROCYTE [DISTWIDTH] IN BLOOD BY AUTOMATED COUNT: 17 % (ref 11.5–14.5)
FLUAV RNA SPEC QL NAA+PROBE: NOT DETECTED
FLUBV RNA SPEC QL NAA+PROBE: NOT DETECTED
GLUCOSE SERPL-MCNC: 163 MG/DL (ref 65–100)
HCT VFR BLD AUTO: 29.4 % (ref 35–47)
HGB BLD-MCNC: 9.1 G/DL (ref 11.5–16)
IMM GRANULOCYTES # BLD AUTO: 0 K/UL
IMM GRANULOCYTES NFR BLD AUTO: 0 %
LYMPHOCYTES # BLD: 0.1 K/UL (ref 0.8–3.5)
LYMPHOCYTES NFR BLD: 1 % (ref 12–49)
MCH RBC QN AUTO: 29.5 PG (ref 26–34)
MCHC RBC AUTO-ENTMCNC: 31 G/DL (ref 30–36.5)
MCV RBC AUTO: 95.5 FL (ref 80–99)
MONOCYTES # BLD: 1 K/UL (ref 0–1)
MONOCYTES NFR BLD: 12 % (ref 5–13)
NEUTS BAND NFR BLD MANUAL: 1 % (ref 0–6)
NEUTS SEG # BLD: 7.2 K/UL (ref 1.8–8)
NEUTS SEG NFR BLD: 86 % (ref 32–75)
NRBC # BLD: 0.05 K/UL (ref 0–0.01)
NRBC BLD-RTO: 0.6 PER 100 WBC
PLATELET # BLD AUTO: 171 K/UL (ref 150–400)
PMV BLD AUTO: 11 FL (ref 8.9–12.9)
POTASSIUM SERPL-SCNC: 3.5 MMOL/L (ref 3.5–5.1)
RBC # BLD AUTO: 3.08 M/UL (ref 3.8–5.2)
RBC MORPH BLD: ABNORMAL
SARS-COV-2 RNA RESP QL NAA+PROBE: NOT DETECTED
SODIUM SERPL-SCNC: 147 MMOL/L (ref 136–145)
TROPONIN I SERPL HS-MCNC: 13 NG/L (ref 0–51)
UFH PPP CHRO-ACNC: 0.65 IU/ML
UFH PPP CHRO-ACNC: 0.72 IU/ML
UFH PPP CHRO-ACNC: >1.1 IU/ML
UFH PPP CHRO-ACNC: >1.1 IU/ML
WBC # BLD AUTO: 8.3 K/UL (ref 3.6–11)

## 2024-07-20 PROCEDURE — 80048 BASIC METABOLIC PNL TOTAL CA: CPT

## 2024-07-20 PROCEDURE — 2500000003 HC RX 250 WO HCPCS: Performed by: INTERNAL MEDICINE

## 2024-07-20 PROCEDURE — 87636 SARSCOV2 & INF A&B AMP PRB: CPT

## 2024-07-20 PROCEDURE — 85025 COMPLETE CBC W/AUTO DIFF WBC: CPT

## 2024-07-20 PROCEDURE — 94640 AIRWAY INHALATION TREATMENT: CPT

## 2024-07-20 PROCEDURE — 36415 COLL VENOUS BLD VENIPUNCTURE: CPT

## 2024-07-20 PROCEDURE — 99223 1ST HOSP IP/OBS HIGH 75: CPT | Performed by: STUDENT IN AN ORGANIZED HEALTH CARE EDUCATION/TRAINING PROGRAM

## 2024-07-20 PROCEDURE — 85520 HEPARIN ASSAY: CPT

## 2024-07-20 PROCEDURE — 6360000002 HC RX W HCPCS: Performed by: INTERNAL MEDICINE

## 2024-07-20 PROCEDURE — 2580000003 HC RX 258: Performed by: INTERNAL MEDICINE

## 2024-07-20 PROCEDURE — 1100000000 HC RM PRIVATE

## 2024-07-20 PROCEDURE — 6370000000 HC RX 637 (ALT 250 FOR IP): Performed by: INTERNAL MEDICINE

## 2024-07-20 PROCEDURE — 6360000002 HC RX W HCPCS: Performed by: STUDENT IN AN ORGANIZED HEALTH CARE EDUCATION/TRAINING PROGRAM

## 2024-07-20 PROCEDURE — 92610 EVALUATE SWALLOWING FUNCTION: CPT

## 2024-07-20 PROCEDURE — 94761 N-INVAS EAR/PLS OXIMETRY MLT: CPT

## 2024-07-20 PROCEDURE — 84484 ASSAY OF TROPONIN QUANT: CPT

## 2024-07-20 RX ADMIN — IPRATROPIUM BROMIDE AND ALBUTEROL SULFATE 1 DOSE: .5; 3 SOLUTION RESPIRATORY (INHALATION) at 08:04

## 2024-07-20 RX ADMIN — IPRATROPIUM BROMIDE AND ALBUTEROL SULFATE 1 DOSE: .5; 3 SOLUTION RESPIRATORY (INHALATION) at 20:03

## 2024-07-20 RX ADMIN — HEPARIN SODIUM 12 UNITS/KG/HR: 10000 INJECTION, SOLUTION INTRAVENOUS at 13:27

## 2024-07-20 RX ADMIN — IPRATROPIUM BROMIDE AND ALBUTEROL SULFATE 1 DOSE: .5; 3 SOLUTION RESPIRATORY (INHALATION) at 12:34

## 2024-07-20 RX ADMIN — SODIUM CHLORIDE, PRESERVATIVE FREE 10 ML: 5 INJECTION INTRAVENOUS at 22:34

## 2024-07-20 RX ADMIN — IPRATROPIUM BROMIDE AND ALBUTEROL SULFATE 1 DOSE: .5; 3 SOLUTION RESPIRATORY (INHALATION) at 16:57

## 2024-07-20 RX ADMIN — PIPERACILLIN AND TAZOBACTAM 4500 MG: 4; .5 INJECTION, POWDER, LYOPHILIZED, FOR SOLUTION INTRAVENOUS at 23:50

## 2024-07-20 RX ADMIN — POTASSIUM CHLORIDE, DEXTROSE MONOHYDRATE AND SODIUM CHLORIDE: 150; 5; 450 INJECTION, SOLUTION INTRAVENOUS at 13:25

## 2024-07-20 RX ADMIN — PIPERACILLIN AND TAZOBACTAM 4500 MG: 4; .5 INJECTION, POWDER, LYOPHILIZED, FOR SOLUTION INTRAVENOUS at 16:18

## 2024-07-20 RX ADMIN — PIPERACILLIN AND TAZOBACTAM 4500 MG: 4; .5 INJECTION, POWDER, LYOPHILIZED, FOR SOLUTION INTRAVENOUS at 08:31

## 2024-07-20 RX ADMIN — Medication 2 PUFF: at 20:03

## 2024-07-20 RX ADMIN — Medication 2 PUFF: at 08:14

## 2024-07-20 RX ADMIN — SODIUM CHLORIDE, PRESERVATIVE FREE 10 ML: 5 INJECTION INTRAVENOUS at 08:33

## 2024-07-20 RX ADMIN — SODIUM CHLORIDE, PRESERVATIVE FREE 10 ML: 5 INJECTION INTRAVENOUS at 00:44

## 2024-07-20 RX ADMIN — PIPERACILLIN AND TAZOBACTAM 4500 MG: 4; .5 INJECTION, POWDER, LYOPHILIZED, FOR SOLUTION INTRAVENOUS at 00:41

## 2024-07-20 ASSESSMENT — PAIN SCALES - GENERAL
PAINLEVEL_OUTOF10: 0

## 2024-07-20 NOTE — CONSULTS
Gastroenterology Consult Note        Patient: April Mayo MRN: [id number]  SSN: [id number]    YOB: 1955  Age: 69 y.o.  Sex: female      Subjective:      April Mayo is a 69 y.o. female who is being seen for dysphagia malnutrition, history of from patient's medical records from the patient family member    .69 y.o.  female with significant throat cancer, s/p trach ongoing radiation treatments is brought in with Difficult to eating progressive worsening weakness and decreased oral intake along with a significant cough for the last 1 week.  Sister states patient has not been tolerating her oral diet at all over the last 1 week with decreased oral intake.emergency room patient was found to be had the pneumonia, right side of DVT, has been on heparin,  Patient now received antibiotic treatmentand IV fluids resuscitation. GI consultation placed for PEG tube placement,  Past Medical History:   Diagnosis Date    Arthritis     Graves disease     Hypertension      Past Surgical History:   Procedure Laterality Date    BUNIONECTOMY      HYSTERECTOMY (CERVIX STATUS UNKNOWN)      HYSTERECTOMY, VAGINAL      IR PORT PLACEMENT EQUAL OR GREATER THAN 5 YEARS  5/3/2024    IR PORT PLACEMENT EQUAL OR GREATER THAN 5 YEARS 5/3/2024 Kanchan Gibbs PA [facility] RAD ANGIO IR    OTHER SURGICAL HISTORY      eye surgery for graves disease    TRACHEOSTOMY N/A 3/14/2024    AWAKE URGENT TRACHEOSTOMY AND BIOPSY (LOCAL WITH ZAIDA NIEVES) performed by Vaishali Pringle MD at [facility] MAIN OR      Family History   Problem Relation Age of Onset    Heart Failure Mother     Rheum Arthritis Sister     Stroke Sister     Thyroid Disease Sister      Social History     Tobacco Use    Smoking status: Former     Current packs/day: 0.00     Types: Cigarettes     Quit date: 2023     Years since quittin.8    Smokeless tobacco: Never   Substance Use Topics    Alcohol use: Never      Current Facility-Administered Medications  strain on CT.   2.  Right lower lobe pneumonia with adjacent loculated parapneumonic effusion   versus empyema.   3.  A 1.5 cm metallic foreign body in the cecum could be a PillCam or other   ingested foreign body.   4.  Bilateral common femoral and external iliac vein DVT.   5.  Indeterminate 6mm right upper lobe nodule.      Findings discussed with CHAU France at 9:30 p.m. on 7/19/2024.      Electronically signed by Christina Gonzales      CTA CHEST W WO CONTRAST   Final Result   1.  Moderate burden of central pulmonary emboli involving distal main, lobar,   and segmental arteries of all 5 lobes, right greater than left. No evidence of   right heart strain on CT.   2.  Right lower lobe pneumonia with adjacent loculated parapneumonic effusion   versus empyema.   3.  A 1.5 cm metallic foreign body in the cecum could be a PillCam or other   ingested foreign body.   4.  Bilateral common femoral and external iliac vein DVT.   5.  Indeterminate 6mm right upper lobe nodule.      Findings discussed with CHAU France at 9:30 p.m. on 7/19/2024.      Electronically signed by Christina Gonzales      Vascular duplex lower extremity venous right   Final Result      XR CHEST PORTABLE   Final Result   Increased airspace disease is present in the right base.        Electronically signed by ARMANDO TONY           [unfilled]  Assessment:   Dysphagia, odynophagia, with radiation treatment now for throat cancer,  dehydration, mild acute renal failure,  Poor nutrition condition,    Coronary treatment for DVT with heparin  Plan:   Continue current antibiotic treatment  IV hydration withIV fluids    Hematology recommendation in the chart,.  Patient need long-term nutrition support  Electrolyte correction  Nutrition consultations  Speech therapy follow the patient,    Tentatively schedule patient for modified barium swallow test Monday morning, may need PEG tube placemen soon possible due to the patient's lack of access to feeding,Moderate to severe

## 2024-07-20 NOTE — CONSULTS
Hematology and Oncology Inpatient Consult Note     Patient: April Mayo MRN: [id number]  SSN: [id number]    YOB: 1955  Age: 69 y.o.  Sex: female    Chief Complaint: Patient was admitted with decreased p.o. intake and right lower extremity swelling    Reason for consult: Evaluation and management of patient with supraglottic head and neck cancer    Subjective:      April Mayo is a 69 y.o. -American female who is being accompanied by her 2 daughters at bedside who came with increasing difficulty in swallowing and decreased p.o. intake.  Patient was not able to eat or drink much and then she started having right lower extremity swelling and swelling was all the way about the knee.  Patient had last day of radiation therapy on 19 July.  Her last chemotherapy was on 1 July 2024.  Patient has tracheostomy tube and able to communicate with yes or no answers.  Her daughters are at bedside and answers for her.  Patient is being managed by my associate Dr. Mahajan.  Patient is feeling tired and weak.  She had low-grade fever but no chills.  She also had some cough with dyspnea.    Past Medical History:   Diagnosis Date    Arthritis     Graves disease     Hypertension      Past Surgical History:   Procedure Laterality Date    BUNIONECTOMY      HYSTERECTOMY (CERVIX STATUS UNKNOWN)      HYSTERECTOMY, VAGINAL      IR PORT PLACEMENT EQUAL OR GREATER THAN 5 YEARS  5/3/2024    IR PORT PLACEMENT EQUAL OR GREATER THAN 5 YEARS 5/3/2024 Kanchan Gibbs, PA [facility] RAD ANGIO IR    OTHER SURGICAL HISTORY      eye surgery for graves disease    TRACHEOSTOMY N/A 3/14/2024    AWAKE URGENT TRACHEOSTOMY AND BIOPSY (LOCAL WITH ZAIDA STANDBY) performed by Vaishali Pringle MD at [facility] MAIN OR      Ca Larynx:  S/p tracheostomy and right vocal cord biopsy on 3/14  At least T3 disease per ENT  No lymph node enlargement on CT neck  Patient declined laryngectomy and is therefore a candidate for concurrent chemo RT  PET scan  Specimen: Nasopharyngeal   Result Value Ref Range    SARS-CoV-2, PCR Not Detected Not Detected      Rapid Influenza A By PCR Not Detected Not Detected      Rapid Influenza B By PCR Not Detected Not Detected     Heparin, Anti-Xa    Collection Time: 07/20/24  1:00 AM   Result Value Ref Range    Heparin Xa,LMWH and Unfrac >1.10 (HH) IU/mL   Basic Metabolic Panel w/ Reflex to MG    Collection Time: 07/20/24  1:00 AM   Result Value Ref Range    Sodium 147 (H) 136 - 145 mmol/L    Potassium 3.5 3.5 - 5.1 mmol/L    Chloride 115 (H) 97 - 108 mmol/L    CO2 24 21 - 32 mmol/L    Anion Gap 8 5 - 15 mmol/L    Glucose 163 (H) 65 - 100 mg/dL    BUN 40 (H) 6 - 20 mg/dL    Creatinine 1.24 (H) 0.55 - 1.02 mg/dL    BUN/Creatinine Ratio 32 (H) 12 - 20      Est, Glom Filt Rate 47 (L) >60 ml/min/1.73m2    Calcium 9.0 8.5 - 10.1 mg/dL   CBC with Auto Differential    Collection Time: 07/20/24  1:00 AM   Result Value Ref Range    WBC 8.3 3.6 - 11.0 K/uL    RBC 3.08 (L) 3.80 - 5.20 M/uL    Hemoglobin 9.1 (L) 11.5 - 16.0 g/dL    Hematocrit 29.4 (L) 35.0 - 47.0 %    MCV 95.5 80.0 - 99.0 FL    MCH 29.5 26.0 - 34.0 PG    MCHC 31.0 30.0 - 36.5 g/dL    RDW 17.0 (H) 11.5 - 14.5 %    Platelets 171 150 - 400 K/uL    MPV 11.0 8.9 - 12.9 FL    Nucleated RBCs 0.6 (H) 0.0  WBC    nRBC 0.05 (H) 0.00 - 0.01 K/uL    Neutrophils % 86 (H) 32 - 75 %    Band Neutrophils 1 0 - 6 %    Lymphocytes % 1 (L) 12 - 49 %    Monocytes % 12 5 - 13 %    Eosinophils % 0 0 - 7 %    Basophils % 0 0 - 1 %    Immature Granulocytes % 0 %    Neutrophils Absolute 7.2 1.8 - 8.0 K/UL    Lymphocytes Absolute 0.1 (L) 0.8 - 3.5 K/UL    Monocytes Absolute 1.0 0.0 - 1.0 K/UL    Eosinophils Absolute 0.0 0.0 - 0.4 K/UL    Basophils Absolute 0.0 0.0 - 0.1 K/UL    Immature Granulocytes Absolute 0.0 K/UL    Differential Type Manual      RBC Comment Normocytic, Normochromic     Troponin    Collection Time: 07/20/24  1:00 AM   Result Value Ref Range    Troponin, High Sensitivity 13 0 - 51  ng/L   Heparin, Anti-Xa    Collection Time: 07/20/24  7:11 AM   Result Value Ref Range    Heparin Xa,LMWH and Unfrac >1.10 (HH) IU/mL        CT ABDOMEN PELVIS W IV CONTRAST Additional Contrast? None   Final Result   1.  Moderate burden of central pulmonary emboli involving distal main, lobar,   and segmental arteries of all 5 lobes, right greater than left. No evidence of   right heart strain on CT.   2.  Right lower lobe pneumonia with adjacent loculated parapneumonic effusion   versus empyema.   3.  A 1.5 cm metallic foreign body in the cecum could be a PillCam or other   ingested foreign body.   4.  Bilateral common femoral and external iliac vein DVT.   5.  Indeterminate 6mm right upper lobe nodule.      Findings discussed with CHAU France at 9:30 p.m. on 7/19/2024.      Electronically signed by Christina Gonzales      CTA CHEST W WO CONTRAST   Final Result   1.  Moderate burden of central pulmonary emboli involving distal main, lobar,   and segmental arteries of all 5 lobes, right greater than left. No evidence of   right heart strain on CT.   2.  Right lower lobe pneumonia with adjacent loculated parapneumonic effusion   versus empyema.   3.  A 1.5 cm metallic foreign body in the cecum could be a PillCam or other   ingested foreign body.   4.  Bilateral common femoral and external iliac vein DVT.   5.  Indeterminate 6mm right upper lobe nodule.      Findings discussed with CHAU [place] at 9:30 p.m. on 7/19/2024.      Electronically signed by Christina Gonzales      Vascular duplex lower extremity venous right   Final Result      XR CHEST PORTABLE   Final Result   Increased airspace disease is present in the right base.        Electronically signed by ARMANDO TONY         Assessment & Plan:       1) supraglottic laryngeal carcinoma: On concurrent chemotherapy with cisplatin and radiation therapy.  Last cisplatin chemotherapy was on 1 July 2024.  -Patient has completed her intended curative intent chemo and radiation therapy.

## 2024-07-20 NOTE — CONSULTS
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   female in consultation     Excerpts from admission 2024 or consult notes as follows:     April Mayo is a 69-year-old female with a history of post ablative hypothyroidism, vocal cord mass, dysphonia, neoplasm of uncertain behavior of the vocal cord, tracheostomy, laryngeal cancer, and aspiration pneumonia, who presented to the emergency department yesterday for difficulty eating, drinking, and throat pain secondary to radiation treatment.  Per the patient's daughter, the patient also experienced right leg swelling, and she reported a odor from her tracheostomy.  The patient's daughter also reports that last week the patient was diagnosed with pneumonia and is experiencing an increase in mucus secretions from the trach as well.        Allergies   Allergen Reactions    Codeine Other (See Comments)     NAUSEA, VOMITING         MAR reviewed and pertinent medications noted or modified as needed     Current Facility-Administered Medications   Medication Dose Route Frequency Provider Last Rate Last Admin    nystatin (MYCOSTATIN) 942117 UNIT/ML suspension 500,000 Units  5 mL Oral 4x Daily Collins Chacon MD   500,000 Units at 24 1417    heparin (porcine) injection 7,300 Units  80 Units/kg IntraVENous PRN Al  intended for the qualitative detection of nucleic acids from SARS-CoV-2, Influenza A, and Influenza B in nasopharyngeal and nasal swab specimens for use under the FDA's Emergency Use Authorization (EUA) only.   Fact sheet for Patients: https://www.fda.gov/media/002463/download Fact sheet   for Healthcare Providers: https://www.fda.fov/media/912058/download         COVID-19 & Influenza Combo [[id number]] Collected: 07/19/24 1830    Order Status: Canceled Specimen: Nasopharyngeal     Culture, Blood 2 [[id number]] Collected: 07/19/24 1826    Order Status: Completed Specimen: Blood Updated: 07/20/24 0839     Special Requests No Special Requests        Culture No growth after 12 hours       Culture, Blood 1 [[id number]]     Order Status: Canceled Specimen: Blood     COVID-19, Rapid [[id number]]     Order Status: Canceled Specimen: Nasopharyngeal Swab     Rapid influenza A/B antigens [[id number]]     Order Status: Canceled Specimen: Nasopharyngeal     Culture, Blood 1 [[id number]] Collected: 07/19/24 1321    Order Status: Completed Specimen: Blood Updated: 07/20/24 0839     Special Requests --        No Special Requests  Port       Culture No growth after 18 hours                Imaging:  CT ABDOMEN PELVIS W IV CONTRAST Additional Contrast? None    Result Date: 7/19/2024  EXAM:  CTA CHEST W WO CONTRAST, CT ABDOMEN PELVIS W IV CONTRAST INDICATION: Severe cough and shortness of breath with acute DVT COMPARISON: None. TECHNIQUE: Helical thin section chest CT following intravenous administration of nonionic contrast 100 mL of isovue 370 according to departmental PE protocol. Coronal and sagittal reformats were performed. 3D post processing was performed. Been, thin axial images were obtained through the abdomen and pelvis. Coronal and sagittal reconstructions were generated. CT dose reduction was achieved through use of a standardized protocol tailored for this examination and automatic exposure control for dose modulation.

## 2024-07-20 NOTE — PLAN OF CARE
Problem: Discharge Planning  Goal: Discharge to home or other facility with appropriate resources  Outcome: Progressing     Problem: Pain  Goal: Verbalizes/displays adequate comfort level or baseline comfort level  Outcome: Progressing     Problem: Skin/Tissue Integrity  Goal: Absence of new skin breakdown  Description: 1.  Monitor for areas of redness and/or skin breakdown  2.  Assess vascular access sites hourly  3.  Every 4-6 hours minimum:  Change oxygen saturation probe site  4.  Every 4-6 hours:  If on nasal continuous positive airway pressure, respiratory therapy assess nares and determine need for appliance change or resting period.  Outcome: Progressing     Problem: ABCDS Injury Assessment  Goal: Absence of physical injury  Outcome: Progressing     Problem: Safety - Adult  Goal: Free from fall injury  Outcome: Progressing     Problem: SLP Adult - Impaired Swallowing  Goal: By Discharge: Advance to least restrictive diet without signs or symptoms of aspiration for planned discharge setting.  See evaluation for individualized goals.  Description: Speech Therapy Goals  Initiated 7/20/2024  -Patient stated goal: \"I don't want to cough when eating/drinking\"  -Patient will participate in modified barium swallow study within 7 day(s).         [ ] Not met  [ ]  MET   [ ] Progressing  [ ] Discontinue  -Patient will demonstrate understanding of swallow safety precautions and aspiration precautions, diet recs within 7 day(s).        [ ] Not met  [ ]  MET   [ ] Progressing  [ ] Discontinue   7/20/2024 1248 by Josephine Boyer, SLP  Outcome: Progressing

## 2024-07-20 NOTE — CONSULTS
Subjective:   April Mayo   69 y.o.   1955     Refered by: No referring provider defined for this encounter.     Consult Note:   Chief Compliant: Dysphonia     History of Present Illness:  April Mayo is a 69 y.o. female with past medical history of arthritis, graves disease s/p ablation, HTN, who was referred from Dr. Darek Moscoso for evaluation of dysphonia.     Patient was found to have a large obstructing laryngeal tumor, s/p awake trach. Treated with definitive CRT, completed on Friday 7/19/24    Interval Hx:   7/20/24:   Patient was having worsening dysphagia for the last couple weeks. Admitted last night for aspiration PNA and PE. Currently on Zosyn and heparin drip.     Review of Systems  Consitutional: denies fever, excessive weight gain or loss.  Eyes: denies diplopia, eye pain.  Integumentary: denies new concerning skin lesions.  Ears, Nose, Mouth, Throat: denies except as per HPI.  Endocrine: denies hot or cold intolerance, increased thirst.  Respiratory: denies cough, hemoptysis, wheezing  Gastrointestinal: denies trouble swallowing, nausea, emesis, regurgitation  Musculoskeletal: denies muscle weakness or wasting  Cardiovascular: denies chest pain, shortness of breath  Neurologic: denies seizures, numbness or tingling, syncope  Hematologic: denies easy bleeding or bruising       Past Medical History:   Diagnosis Date    Arthritis     Graves disease     Hypertension      Past Surgical History:   Procedure Laterality Date    BUNIONECTOMY      HYSTERECTOMY (CERVIX STATUS UNKNOWN)      HYSTERECTOMY, VAGINAL      IR PORT PLACEMENT EQUAL OR GREATER THAN 5 YEARS  5/3/2024    IR PORT PLACEMENT EQUAL OR GREATER THAN 5 YEARS 5/3/2024 Kanchan Gibbs PA [facility] RAD ANGIO IR    OTHER SURGICAL HISTORY      eye surgery for graves disease    TRACHEOSTOMY N/A 3/14/2024    AWAKE URGENT TRACHEOSTOMY AND BIOPSY (LOCAL WITH ZAIDA NIEVES) performed by Vaishali Pringle MD at [facility] MAIN OR      Family History   Problem  erythema along arytenoid   Subglottis: Unable to be visualized due to laryngeal obstruction  Tracheobronchoscopy: Tracheostomy tube in good position without significant tracheal irritation.  No increased or purulent secretions coming from bronchi        Labs:    Latest Reference Range & Units 07/20/24 01:00   Sodium 136 - 145 mmol/L 147 (H)   Potassium 3.5 - 5.1 mmol/L 3.5   Chloride 97 - 108 mmol/L 115 (H)   CARBON DIOXIDE 21 - 32 mmol/L 24   BUN,BUNPL 6 - 20 mg/dL 40 (H)   Creatinine 0.55 - 1.02 mg/dL 1.24 (H)   Bun/Cre 12 - 20   32 (H)   Anion Gap 5 - 15 mmol/L 8   Est, Glom Filt Rate >60 ml/min/1.73m2 47 (L)   Glucose 65 - 100 mg/dL 163 (H)   Calcium 8.5 - 10.1 mg/dL 9.0   Troponin, High Sensitivity 0 - 51 ng/L 13   WBC 3.6 - 11.0 K/uL 8.3   RBC 3.80 - 5.20 M/uL 3.08 (L)   Hemoglobin Quant 11.5 - 16.0 g/dL 9.1 (L)   Hematocrit 35.0 - 47.0 % 29.4 (L)   MCV 80.0 - 99.0 FL 95.5   MCH 26.0 - 34.0 PG 29.5   MCHC 30.0 - 36.5 g/dL 31.0   MPV 8.9 - 12.9 FL 11.0   RDW 11.5 - 14.5 % 17.0 (H)   Platelet Count 150 - 400 K/uL 171   (H): Data is abnormally high  (L): Data is abnormally low    Radiology: Imaging studies independently review by me - transglottic R TVC mass, no overt cervical LAD. CXR = subq emphysema and pneumomediastinum, no pneumothorax  CT Neck:   There is masslike soft tissue in the right aspect of the larynx resulting in  greater than 50% narrowing of the airway at this level.    CTA C/A/P:   IMPRESSION:  1.  Moderate burden of central pulmonary emboli involving distal main, lobar,  and segmental arteries of all 5 lobes, right greater than left. No evidence of  right heart strain on CT.  2.  Right lower lobe pneumonia with adjacent loculated parapneumonic effusion  versus empyema.  3.  A 1.5 cm metallic foreign body in the cecum could be a PillCam or other  ingested foreign body.  4.  Bilateral common femoral and external iliac vein DVT.  5.  Indeterminate 6mm right upper lobe nodule.    Pathology:   Vocal

## 2024-07-20 NOTE — PROGRESS NOTES
Hospitalist Progress Note    NAME:   April Mayo   : 1955   MRN: [id number]     Date/Time: 2024 8:47 AM  Patient PCP: Lyly Bowman MD    Estimated discharge date:  Barriers:       Assessment / Plan:    SIRS  Low-grade fever with mild tachycardia and tachypnea without significant hypoxia.  No significant leukocytosis however left shift on differential is noted.  Patient is however significantly immunocompromised with recent chemotherapy and ongoing radiation therapy currently.  Likely secondary to right-sided pneumonia as evaluated on chest x-ray.  Obtain blood cultures x 2.     Aspiration pneumonia  As endorsed by patient that she may be choking on her oral intake.  Continue on IV Zosyn.  Continue on DuoNebs as well as pulmonology consultation.  Continue Symbicort, Zyrtec's and start on Mucinex.     Moderate burden bilateral acute pulmonary emboli   Continue heparin GTT.  No significant right heart strain noted on CT angiogram chest.  Obtain echocardiogram.    Severe acute right-sided DVT  Per IR, CT abdomen/pelvis with IV dye pending.  Start patient on heparin GTT.     Mild CHRISTEN  Creatinine mildly elevated to 1.33.  Baseline creatinine of 0.76 noted.  Start on gentle IV fluids.     Hypertension  Hold patient's Cozaar given mild CHRISTEN.  Currently normotensive.     Full CODE STATUS      Medical Decision Making:   I personally reviewed labs:  I personally reviewed imaging:  I personally reviewed EKG:  Toxic drug monitoring:     Discussed case with: Patient, nursing    Subjective:     Chief Complaint / Reason for Physician Visit  \" Weakness and significant cough\".  Discussed with RN events overnight.      - Admission H&P  69 y.o.  female with significant throat cancer, s/p trach to room air, hypertension, right chest IR port placement, ongoing radiation treatments is brought in by sister from home with progressive worsening weakness and decreased oral intake along with a significant cough  for the last 1 week.     Patient is evaluated in the ED and is alert and oriented x 3 with sister at bedside.  Patient unable to verbalize secondary to not having Passy-Bremond valve.  However able to fully cooperate with exam and answer with yes/no questions.  Sister states patient has not been tolerating her oral diet at all over the last 1 week with decreased oral intake.  Patient herself endorses significant cough without any fever/chills, hemoptysis, nausea/vomiting or abdominal pain.  Patient also endorses possible aspiration and choking on her food with oral intake more than 1 week prior.     Sister endorses right lower extremity swelling significantly more than left.     In the ED, patient has mildly febrile with temperature of 99.0 however normotensive and mildly tachypneic with respiratory rate in the 20s and mildly tachycardic however not hypoxic.    7/20  Patient is alert and oriented x 3 however unable to verbalize secondary to trach.  Continues to have cough without any chest pain.  No overnight fever/chills, nausea/vomiting, abdominal pain noted.    Counseled on significant bilateral pulmonary emboli also noted on CT angiogram chest along with significant right-sided aspiration pneumonia.  Will continue IV antibiotics as well as heparin GTT.    Objective:     VITALS:   Last 24hrs VS reviewed since prior progress note. Most recent are:  Patient Vitals for the past 24 hrs:   BP Temp Temp src Pulse Resp SpO2 Height Weight   07/20/24 0828 119/69 98.7 °F (37.1 °C) Oral 95 18 98 % -- --   07/20/24 0808 -- -- -- (!) 1 -- 97 % -- --   07/20/24 0342 126/76 97.7 °F (36.5 °C) -- 97 18 100 % -- --   07/20/24 0306 -- -- -- 78 18 97 % -- --   07/19/24 2211 -- -- -- 82 18 96 % -- --   07/19/24 2100 (!) 143/73 98 °F (36.7 °C) Oral 96 19 98 % -- --   07/19/24 2057 -- -- -- 81 18 95 % -- --   07/19/24 1459 101/62 -- -- -- -- 98 % -- --   07/19/24 1449 119/71 -- -- -- -- 96 % -- --   07/19/24 1429 103/77 -- -- -- -- 99 %

## 2024-07-20 NOTE — PLAN OF CARE
Speech LAnguage Pathology Dysphagia EVALUATION    Patient: April Mayo (69 y.o. female)  Date: 7/20/2024  Primary Diagnosis: Aspiration pneumonia of right lower lobe due to vomit (HCC) [J69.0]       Precautions: Aspiration    Time In: 1219  Time Out: 1237    DIET RECOMMENDATIONS: Rec continue NPO    ASSESSMENT :  ST consult received and chart reviewed. Patient participated in MBS 3/20 - SLP rec regular, thin liquid diet. Patient was last seen 3/29 with rec to continue regular, thin liquid diet. Patient/family knowledgeable of trach care and PMSV.    Patient with Shiley 6 cuffless trach. Patient on 10L O2 via T-piece. Min secretions cleaned from trach. Patient donned PMSV prior to PO trials. O2 sats at 99% during eval. HR at 88 prior to PO trials.   Based on the objective data described below, the patient presents with severe pharyngeal dysphagia. Patient accepted 1 ice chip. Oral phase appears largely WFL. Pharyngeal phase c/b mildly delayed swallow initiation. Patient exhibited strong cough immediately after the swallow. Coughing continued for a few seconds. Patient reports increased pain in her throat due to coughing. HR increased to 107. O2 sats remained at 98%-99%. Patient began shaking. Patient and daughter report this has been occurring every time patient eats/drinks. Patient reports this started a couple weeks ago. Patient's daughter reports patient may have PEG placement some time next week. Patient declined additional PO trials. Clinician discussed re-attempting PO trials Sunday or waiting until Monday for MBS. Patient and daughter agreed to wait until Monday for MBS. This was discussed with nsdacia and MD    Patient will benefit from skilled intervention to address the above impairments.    GOALS:  Problem: SLP Adult - Impaired Swallowing  Goal: By Discharge: Advance to least restrictive diet without signs or symptoms of aspiration for planned discharge setting.  See evaluation for individualized  NPO  Hearing: Adequate     General:   O2 Device: T-Piece  Liters of Oxygen: 10 L  Current Diet : NPO    Dysphagia:  Oral Assessment:  Oral Motor   Labial: No impairment  Dentition: Other (comment);Natural;Limited (Missing some teeth)  Oral Hygiene: Clean  Lingual: No impairment    P.O. Trials:  PO Trials  Assessment Method(s): Observation;Palpation  Patient Position: HOB raised  Vocal Quality: Aphonic;Breathy  Consistency Presented: Ice Chips  How Presented: Self-fed/presented;Spoon  How much presented:  (1 ice chip)  Bolus Acceptance: No impairment  Bolus Formation/Control: No impairment  Propulsion: No impairment  Oral Residue: None  Initiation of Swallow: Delayed (# of seconds)  Aspiration Signs/Symptoms: Strong cough    Voice:  Vocal Quality: Aphonic;Breathy    After Treatment:  Patient left in no apparent distress in bed, Call bell left within reach, Nursing notified, and Caregiver present     Pain:  VAS (numerical) patient reports pain in her throat - she does not rate - nsg notified     COMMUNICATION/EDUCATION:   Prognosis and SLP POC education provided to Patient and Family via explanation, all questions/concerns addressed. Patient verbalizes understanding.    The patient's plan of care including recommendations, planned interventions, and recommended diet changes were discussed with: Registered nurse and Physician.    Patient/family agree to work toward stated goals and plan of care    Thank you,  Maria Fernanda Bonilla M.S. CCC-SLP  Minutes: 18

## 2024-07-20 NOTE — CONSULTS
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   female in consultation     Excerpts from admission 2024 or consult notes as follows:     April Mayo is a 69-year-old female with a history of post ablative hypothyroidism, vocal cord mass, dysphonia, neoplasm of uncertain behavior of the vocal cord, tracheostomy, laryngeal cancer, and aspiration pneumonia, who presented to the emergency department yesterday for difficulty eating, drinking, and throat pain secondary to radiation treatment.  Per the patient's daughter, the patient also experienced right leg swelling, and she reported a odor from her tracheostomy.  The patient's daughter also reports that last week the patient was diagnosed with pneumonia and is experiencing an increase in mucus secretions from the trach as well.        Allergies   Allergen Reactions    Codeine Other (See Comments)     NAUSEA, VOMITING         MAR reviewed and pertinent medications noted or modified as needed     Current Facility-Administered Medications   Medication Dose Route Frequency Provider Last Rate Last Admin    nystatin (MYCOSTATIN) 884464 UNIT/ML suspension 500,000 Units  5 mL Oral 4x Daily Collins Chacon MD   500,000 Units at 24 1417    heparin (porcine) injection 7,300 Units  80 Units/kg IntraVENous PRN Al  thrombosis in the right peroneal vein. Acute superficial vein thrombosis in the right small saphenous vein.  Chest x-ray done yesterday shows increased airspace disease in the right base.  Patient is receiving Symbicort, Zyrtec, Mucinex, Heparin, DuoNeb, Synthroid, mucous statin, morphine, Zosyn.   Supplemental O2 to keep sats > 93%  Aspiration precautions  Labs to follow electrolytes, renal function and and blood counts  Glucose monitoring and SSI  Bronchial hygiene with respiratory therapy techniques, bronchodilators  DVT, SUP prophylaxis  Pt needs IV fluids with additives and Drug therapy requiring intensive monitoring for toxicity  Prescription drug management with home med reconciliation reviewed       This care involved high complexity medical decision making: I personally:  Reviewed the flowsheet and previous days notes  Reviewed and summarized records or history from previous days note or discussions with staff, family  High Risk Drug therapy requiring intensive monitoring for toxicity: eg steroids, pressors, antibiotics  Reviewed and/or ordered Clinical lab tests  Reviewed images and/or ordered Radiology tests  Reviewed the patients ECG / Telemetry  Reviewed and/or adjusted NiPPV settings  Called and arranged for Radiologic procedures or interventions  performed or ordered Diagnostic endoscopies with identified risk factors.  discussed my assessment/management with : Nursing, Hospitalist and Family for coordination of care

## 2024-07-20 NOTE — CARE COORDINATION
07/20/24 0940   Service Assessment   Patient Orientation Alert and Oriented   Cognition Alert   History Provided By Patient   Primary Caregiver Self   Support Systems Children   Patient's Healthcare Decision Maker is: Legal Next of Kin   PCP Verified by CM Yes   Last Visit to PCP Within last 3 months   Prior Functional Level Assistance with the following:;Bathing;Dressing;Toileting;Feeding;Cooking;Housework;Shopping   Current Functional Level Assistance with the following:;Bathing;Dressing;Toileting;Feeding;Cooking;Housework;Shopping   Can patient return to prior living arrangement Yes   Ability to make needs known: Good   Family able to assist with home care needs: Yes   Would you like for me to discuss the discharge plan with any other family members/significant others, and if so, who? Yes  (Daughter)   Financial Resources Medicare   Social/Functional History   Lives With Daughter   Home Equipment Hospital bed   ADL Assistance Independent   Ambulation Assistance Independent   Discharge Planning   Living Arrangements Children   Patient expects to be discharged to: House   Services At/After Discharge   Mode of Transport at Discharge Other (see comment)  (Daughter)     Patient lives at home with her daughter.  Patient has a hospital bed at home and has a trach.  Patient does not require oxygen to her trach.  Patient has no HH or PCAs.  Patient reports her daughter helping with ADLs, but is independent in movement.  Patient uses the [employer] [facility] in [place], [place]  Patient's daughter will be her ride at discharge.  Current Dispo: Home with Daughter    Advance Care Planning     General Advance Care Planning (ACP) Conversation    Date of Conversation: 7/20/2024  Conducted with: Patient with Decision Making Capacity  Other persons present: None    Healthcare Decision Maker:   Primary Decision Maker: GaelMarlin - Child - [phone]  Click here to complete Healthcare Decision Makers including selection of the Healthcare  Decision Maker Relationship (ie \"Primary\").       Content/Action Overview:  Has NO ACP documents-Information provided  Reviewed DNR/DNI and patient elects Full Code (Attempt Resuscitation)        Length of Voluntary ACP Conversation in minutes:  <16 minutes (Non-Billable)    Kya Sanz RN

## 2024-07-21 ENCOUNTER — APPOINTMENT (OUTPATIENT)
Facility: HOSPITAL | Age: 69
DRG: 871 | End: 2024-07-21
Attending: INTERNAL MEDICINE
Payer: MEDICARE

## 2024-07-21 LAB
ANION GAP SERPL CALC-SCNC: 7 MMOL/L (ref 5–15)
BASOPHILS # BLD: 0 K/UL (ref 0–0.1)
BASOPHILS NFR BLD: 0 % (ref 0–1)
BUN SERPL-MCNC: 27 MG/DL (ref 6–20)
BUN/CREAT SERPL: 23 (ref 12–20)
CA-I BLD-MCNC: 8.6 MG/DL (ref 8.5–10.1)
CHLORIDE SERPL-SCNC: 115 MMOL/L (ref 97–108)
CO2 SERPL-SCNC: 23 MMOL/L (ref 21–32)
CREAT SERPL-MCNC: 1.17 MG/DL (ref 0.55–1.02)
DIFFERENTIAL METHOD BLD: ABNORMAL
ECHO AO ASC DIAM: 2.8 CM
ECHO AO ASCENDING AORTA INDEX: 1.45 CM/M2
ECHO AO ROOT DIAM: 2.8 CM
ECHO AO ROOT INDEX: 1.45 CM/M2
ECHO AV AREA PEAK VELOCITY: 2 CM2
ECHO AV AREA VTI: 1.9 CM2
ECHO AV AREA/BSA PEAK VELOCITY: 1 CM2/M2
ECHO AV AREA/BSA VTI: 1 CM2/M2
ECHO AV MEAN GRADIENT: 5 MMHG
ECHO AV MEAN VELOCITY: 1.1 M/S
ECHO AV PEAK GRADIENT: 9 MMHG
ECHO AV PEAK VELOCITY: 1.5 M/S
ECHO AV VELOCITY RATIO: 0.8
ECHO AV VTI: 26.3 CM
ECHO BSA: 2.01 M2
ECHO LA AREA 4C: 10.4 CM2
ECHO LA DIAMETER INDEX: 1.55 CM/M2
ECHO LA DIAMETER: 3 CM
ECHO LA MAJOR AXIS: 4.4 CM
ECHO LA TO AORTIC ROOT RATIO: 1.07
ECHO LA VOL MOD A4C: 18 ML (ref 22–52)
ECHO LA VOLUME INDEX MOD A4C: 9 ML/M2 (ref 16–34)
ECHO LV E' LATERAL VELOCITY: 8 CM/S
ECHO LV E' SEPTAL VELOCITY: 7 CM/S
ECHO LV FRACTIONAL SHORTENING: 35 % (ref 28–44)
ECHO LV INTERNAL DIMENSION DIASTOLE INDEX: 2.07 CM/M2
ECHO LV INTERNAL DIMENSION DIASTOLIC: 4 CM (ref 3.9–5.3)
ECHO LV INTERNAL DIMENSION SYSTOLIC INDEX: 1.35 CM/M2
ECHO LV INTERNAL DIMENSION SYSTOLIC: 2.6 CM
ECHO LV IVSD: 0.9 CM (ref 0.6–0.9)
ECHO LV MASS 2D: 118.2 G (ref 67–162)
ECHO LV MASS INDEX 2D: 61.3 G/M2 (ref 43–95)
ECHO LV POSTERIOR WALL DIASTOLIC: 1 CM (ref 0.6–0.9)
ECHO LV RELATIVE WALL THICKNESS RATIO: 0.5
ECHO LVOT AREA: 2.5 CM2
ECHO LVOT AV VTI INDEX: 0.75
ECHO LVOT DIAM: 1.8 CM
ECHO LVOT MEAN GRADIENT: 3 MMHG
ECHO LVOT PEAK GRADIENT: 6 MMHG
ECHO LVOT PEAK VELOCITY: 1.2 M/S
ECHO LVOT STROKE VOLUME INDEX: 26.1 ML/M2
ECHO LVOT SV: 50.4 ML
ECHO LVOT VTI: 19.8 CM
ECHO MV A VELOCITY: 0.81 M/S
ECHO MV AREA VTI: 1.7 CM2
ECHO MV E DECELERATION TIME (DT): 248 MS
ECHO MV E VELOCITY: 0.65 M/S
ECHO MV E/A RATIO: 0.8
ECHO MV E/E' LATERAL: 8.13
ECHO MV E/E' RATIO (AVERAGED): 8.71
ECHO MV E/E' SEPTAL: 9.29
ECHO MV LVOT VTI INDEX: 1.49
ECHO MV MAX VELOCITY: 1.1 M/S
ECHO MV MEAN GRADIENT: 2 MMHG
ECHO MV MEAN VELOCITY: 0.7 M/S
ECHO MV PEAK GRADIENT: 4 MMHG
ECHO MV VTI: 29.6 CM
ECHO PV MAX VELOCITY: 0.8 M/S
ECHO PV PEAK GRADIENT: 2 MMHG
ECHO RA AREA 4C: 14.1 CM2
ECHO RA END SYSTOLIC VOLUME APICAL 4 CHAMBER INDEX BSA: 17 ML/M2
ECHO RA VOLUME: 32 ML
ECHO RV BASAL DIMENSION: 3.4 CM
ECHO RV FREE WALL PEAK S': 18 CM/S
ECHO RV TAPSE: 2.2 CM (ref 1.7–?)
ECHO TV REGURGITANT MAX VELOCITY: 2.44 M/S
ECHO TV REGURGITANT PEAK GRADIENT: 24 MMHG
EOSINOPHIL # BLD: 0 K/UL (ref 0–0.4)
EOSINOPHIL NFR BLD: 0 % (ref 0–7)
ERYTHROCYTE [DISTWIDTH] IN BLOOD BY AUTOMATED COUNT: 16.7 % (ref 11.5–14.5)
GLUCOSE SERPL-MCNC: 115 MG/DL (ref 65–100)
HCT VFR BLD AUTO: 26.8 % (ref 35–47)
HGB BLD-MCNC: 8.5 G/DL (ref 11.5–16)
IMM GRANULOCYTES # BLD AUTO: 0 K/UL
IMM GRANULOCYTES NFR BLD AUTO: 0 %
LYMPHOCYTES # BLD: 0.8 K/UL (ref 0.8–3.5)
LYMPHOCYTES NFR BLD: 9 % (ref 12–49)
MCH RBC QN AUTO: 29.7 PG (ref 26–34)
MCHC RBC AUTO-ENTMCNC: 31.7 G/DL (ref 30–36.5)
MCV RBC AUTO: 93.7 FL (ref 80–99)
MONOCYTES # BLD: 0.8 K/UL (ref 0–1)
MONOCYTES NFR BLD: 9 % (ref 5–13)
MYELOCYTES NFR BLD MANUAL: 4 %
NEUTS BAND NFR BLD MANUAL: 5 % (ref 0–6)
NEUTS SEG # BLD: 6.9 K/UL (ref 1.8–8)
NEUTS SEG NFR BLD: 73 % (ref 32–75)
NRBC # BLD: 0.09 K/UL (ref 0–0.01)
NRBC BLD-RTO: 1 PER 100 WBC
PLATELET # BLD AUTO: 210 K/UL (ref 150–400)
PMV BLD AUTO: 10.8 FL (ref 8.9–12.9)
POTASSIUM SERPL-SCNC: 3.6 MMOL/L (ref 3.5–5.1)
RBC # BLD AUTO: 2.86 M/UL (ref 3.8–5.2)
RBC MORPH BLD: ABNORMAL
SODIUM SERPL-SCNC: 145 MMOL/L (ref 136–145)
UFH PPP CHRO-ACNC: 0.49 IU/ML
UFH PPP CHRO-ACNC: 0.51 IU/ML
UFH PPP CHRO-ACNC: 0.54 IU/ML
UFH PPP CHRO-ACNC: 0.6 IU/ML
WBC # BLD AUTO: 8.9 K/UL (ref 3.6–11)

## 2024-07-21 PROCEDURE — 2580000003 HC RX 258: Performed by: INTERNAL MEDICINE

## 2024-07-21 PROCEDURE — 6370000000 HC RX 637 (ALT 250 FOR IP): Performed by: INTERNAL MEDICINE

## 2024-07-21 PROCEDURE — 93306 TTE W/DOPPLER COMPLETE: CPT

## 2024-07-21 PROCEDURE — 6360000002 HC RX W HCPCS: Performed by: STUDENT IN AN ORGANIZED HEALTH CARE EDUCATION/TRAINING PROGRAM

## 2024-07-21 PROCEDURE — 94640 AIRWAY INHALATION TREATMENT: CPT

## 2024-07-21 PROCEDURE — 6360000002 HC RX W HCPCS: Performed by: INTERNAL MEDICINE

## 2024-07-21 PROCEDURE — 2700000000 HC OXYGEN THERAPY PER DAY

## 2024-07-21 PROCEDURE — 6370000000 HC RX 637 (ALT 250 FOR IP): Performed by: EMERGENCY MEDICINE

## 2024-07-21 PROCEDURE — 85520 HEPARIN ASSAY: CPT

## 2024-07-21 PROCEDURE — 94761 N-INVAS EAR/PLS OXIMETRY MLT: CPT

## 2024-07-21 PROCEDURE — 80048 BASIC METABOLIC PNL TOTAL CA: CPT

## 2024-07-21 PROCEDURE — 1100000000 HC RM PRIVATE

## 2024-07-21 PROCEDURE — 2500000003 HC RX 250 WO HCPCS: Performed by: INTERNAL MEDICINE

## 2024-07-21 PROCEDURE — 85025 COMPLETE CBC W/AUTO DIFF WBC: CPT

## 2024-07-21 RX ORDER — SUCRALFATE 1 G/1
1 TABLET ORAL EVERY 8 HOURS SCHEDULED
Status: DISPENSED | OUTPATIENT
Start: 2024-07-21 | End: 2024-07-24

## 2024-07-21 RX ORDER — IPRATROPIUM BROMIDE AND ALBUTEROL SULFATE 2.5; .5 MG/3ML; MG/3ML
1 SOLUTION RESPIRATORY (INHALATION)
Status: DISCONTINUED | OUTPATIENT
Start: 2024-07-21 | End: 2024-07-26 | Stop reason: HOSPADM

## 2024-07-21 RX ADMIN — NYSTATIN 500000 UNITS: 100000 SUSPENSION ORAL at 21:44

## 2024-07-21 RX ADMIN — Medication 2 PUFF: at 20:04

## 2024-07-21 RX ADMIN — PIPERACILLIN AND TAZOBACTAM 4500 MG: 4; .5 INJECTION, POWDER, LYOPHILIZED, FOR SOLUTION INTRAVENOUS at 09:01

## 2024-07-21 RX ADMIN — IPRATROPIUM BROMIDE AND ALBUTEROL SULFATE 1 DOSE: .5; 3 SOLUTION RESPIRATORY (INHALATION) at 06:40

## 2024-07-21 RX ADMIN — Medication 2 PUFF: at 06:40

## 2024-07-21 RX ADMIN — SODIUM CHLORIDE, PRESERVATIVE FREE 10 ML: 5 INJECTION INTRAVENOUS at 21:42

## 2024-07-21 RX ADMIN — IPRATROPIUM BROMIDE AND ALBUTEROL SULFATE 1 DOSE: 2.5; .5 SOLUTION RESPIRATORY (INHALATION) at 13:14

## 2024-07-21 RX ADMIN — PIPERACILLIN AND TAZOBACTAM 4500 MG: 4; .5 INJECTION, POWDER, LYOPHILIZED, FOR SOLUTION INTRAVENOUS at 16:53

## 2024-07-21 RX ADMIN — POTASSIUM CHLORIDE, DEXTROSE MONOHYDRATE AND SODIUM CHLORIDE: 150; 5; 450 INJECTION, SOLUTION INTRAVENOUS at 07:27

## 2024-07-21 RX ADMIN — IPRATROPIUM BROMIDE AND ALBUTEROL SULFATE 1 DOSE: 2.5; .5 SOLUTION RESPIRATORY (INHALATION) at 20:04

## 2024-07-21 RX ADMIN — SODIUM CHLORIDE, PRESERVATIVE FREE 10 ML: 5 INJECTION INTRAVENOUS at 09:04

## 2024-07-21 RX ADMIN — HEPARIN SODIUM 11 UNITS/KG/HR: 10000 INJECTION, SOLUTION INTRAVENOUS at 14:04

## 2024-07-21 ASSESSMENT — PAIN SCALES - GENERAL
PAINLEVEL_OUTOF10: 0

## 2024-07-21 NOTE — PROGRESS NOTES
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   female in consultation     Excerpts from admission 2024 or consult notes as follows:     April Mayo is a 69-year-old female with a history of post ablative hypothyroidism, vocal cord mass, dysphonia, neoplasm of uncertain behavior of the vocal cord, tracheostomy, laryngeal cancer, and aspiration pneumonia, who presented to the emergency department yesterday for difficulty eating, drinking, and throat pain secondary to radiation treatment.  Per the patient's daughter, the patient also experienced right leg swelling, and she reported a odor from her tracheostomy.  The patient's daughter also reports that last week the patient was diagnosed with pneumonia and is experiencing an increase in mucus secretions from the trach as well.        Allergies   Allergen Reactions    Codeine Other (See Comments)     NAUSEA, VOMITING         MAR reviewed and pertinent medications noted or modified as needed     Current Facility-Administered Medications   Medication Dose Route Frequency Provider Last Rate Last Admin    nystatin (MYCOSTATIN) [id number] UNIT/ML suspension 500,000 Units  5 mL Oral 4x Daily Collins Chacon MD   500,000 Units at 24 1417    heparin (porcine) injection 7,300 Units  80 Units/kg IntraVENous PRN Al  tracheostomy  Chest:      Chest wall: No tenderness.   Musculoskeletal:      Right lower leg: Edema present.   Neurological:      General: No focal deficit present.      Mental Status: She is oriented to person, place, and time. Mental status is at baseline.          Labs:    Recent Labs     07/19/24  1826 07/20/24  0100 07/21/24  0206   WBC 8.9 8.3 8.9   HGB 9.4* 9.1* 8.5*    171 210   INR 1.6*  --   --    APTT 30.6  --   --        Recent Labs     07/19/24  1151 07/19/24  1416 07/20/24  0100 07/21/24  0206     --  147* 145   K Hemolyzed, Recollection Recommended 3.6 3.5 3.6   *  --  115* 115*   CO2 23  --  24 23   GLUCOSE 111*  --  163* 115*   BUN 43*  --  40* 27*   CREATININE 1.33*  --  1.24* 1.17*   CALCIUM 9.7  --  9.0 8.6   BILITOT 1.2*  --   --   --    AST Hemolyzed, Recollection Recommended 20  --   --    ALT 34  --   --   --        No results for input(s): \"PH\", \"PCO2\", \"PO2\", \"HCO3\", \"FIO2\" in the last 72 hours.  No results for input(s): \"CKTOTAL\", \"CKMB\", \"CKMBINDEX\", \"TROPONINI\", \"BNP\", \"PROBNP\", \"NTPROBNP\" in the last 72 hours.  No results found for: \"BNP\", \"PROBNP\", \"NTPROBNP\"   Lab Results   Component Value Date/Time    TSH 0.177 02/07/2024 01:51 PM       Results       Procedure Component Value Units Date/Time    COVID-19 & Influenza Combo [[id number]] Collected: 07/20/24 0010    Order Status: Completed Specimen: Nasopharyngeal Updated: 07/20/24 0142     SARS-CoV-2, PCR Not Detected        Comment: Not Detected results do not preclude SARS-CoV-2 infection and should not be used as the sole basis for patient management decisions. Results must be combined with clinical observations, patient history, and epidemiological information        Rapid Influenza A By PCR Not Detected        Rapid Influenza B By PCR Not Detected        Comment: Testing was performed using carla Nazia SARS-CoV-2 and Influenza A/B nucleic acid assay. This test is a multiplex Real-Time Reverse Transcriptase

## 2024-07-21 NOTE — PROGRESS NOTES
Gastroenterology Progress Note        Patient: April Mayo MRN: [id number]  SSN: [id number]    YOB: 1955  Age: 69 y.o.  Sex: female      Admit Date: 7/19/2024    LOS: 2 days     Subjective:   Patient not feels well, minimal p.o. intake and  Difficult and painful to swallow    Current Facility-Administered Medications:     ipratropium 0.5 mg-albuterol 2.5 mg (DUONEB) nebulizer solution 1 Dose, 1 Dose, Inhalation, Q6H WA RT, Darek Moscoso MD, 1 Dose at 07/21/24 1314    nystatin (MYCOSTATIN) [id number] UNIT/ML suspension 500,000 Units, 5 mL, Oral, 4x Daily, Collins Chacon MD, 500,000 Units at 07/19/24 1417    heparin (porcine) injection 7,300 Units, 80 Units/kg, IntraVENous, PRN, Debra Bowman MD    heparin (porcine) injection 3,600 Units, 40 Units/kg, IntraVENous, PRN, Debra Bowman MD    heparin 25,000 units in dextrose 5% 250 mL (premix) infusion, 5-30 Units/kg/hr, IntraVENous, Continuous, Debra Bowman MD, Last Rate: 10 mL/hr at 07/21/24 0331, 11 Units/kg/hr at 07/21/24 0331    dextrose 5 % and 0.45 % NaCl with KCl 20 mEq infusion, , IntraVENous, Continuous, Chavo Rodriguez MD, Last Rate: 100 mL/hr at 07/21/24 0727, New Bag at 07/21/24 0727    sodium chloride flush 0.9 % injection 5-40 mL, 5-40 mL, IntraVENous, 2 times per day, Chavo Rodriguez MD, 10 mL at 07/21/24 0904    sodium chloride flush 0.9 % injection 5-40 mL, 5-40 mL, IntraVENous, PRN, Chavo Rodriguez MD    0.9 % sodium chloride infusion, , IntraVENous, PRN, Chavo Rodriguez MD    ondansetron (ZOFRAN-ODT) disintegrating tablet 4 mg, 4 mg, Oral, Q8H PRN **OR** ondansetron (ZOFRAN) injection 4 mg, 4 mg, IntraVENous, Q6H PRN, Chavo Rodriguez MD    polyethylene glycol (GLYCOLAX) packet 17 g, 17 g, Oral, Daily PRN, Chavo Rodriguez MD    acetaminophen (TYLENOL) tablet 650 mg, 650 mg, Oral, Q6H PRN **OR** acetaminophen (TYLENOL) suppository 650 mg, 650 mg, Rectal, Q6H PRN, Chavo Rodriguez MD    guaiFENesin  (MUCINEX) extended release tablet 600 mg, 600 mg, Oral, BID, Chavo Rodriguez MD    [COMPLETED] piperacillin-tazobactam (ZOSYN) 4,500 mg in sodium chloride 0.9 % 100 mL IVPB (mini-bag), 4,500 mg, IntraVENous, Once, Last Rate: 200 mL/hr at 07/19/24 1929, 4,500 mg at 07/19/24 1929 **AND** piperacillin-tazobactam (ZOSYN) 4,500 mg in sodium chloride 0.9 % 100 mL IVPB (mini-bag), 4,500 mg, IntraVENous, Q8H, Chavo Rodriguez MD, Last Rate: 25 mL/hr at 07/21/24 0901, 4,500 mg at 07/21/24 0901    Benzocaine-Menthol (CEPACOL) 1 lozenge, 1 lozenge, Oral, Q2H PRN, Chavo Rodriguez MD    budesonide-formoterol (SYMBICORT) 160-4.5 MCG/ACT inhaler 2 puff, 2 puff, Inhalation, BID RT, Chavo Rodriguez MD, 2 puff at 07/21/24 0640    cetirizine (ZYRTEC) tablet 10 mg, 10 mg, Oral, Daily, Chavo Rodriguez MD    levothyroxine (SYNTHROID) tablet 150 mcg, 150 mcg, Oral, Daily, Chavo Rodriguez MD    sodium chloride 0.9 % bolus 1,000 mL, 1,000 mL, IntraVENous, Once, Chavo Rodriguez MD    Objective:     Vitals:    07/21/24 0911 07/21/24 1150 07/21/24 1245 07/21/24 1314   BP:   136/75    Pulse: 85 81 90 84   Resp: 18 18 18 18   Temp:   98.6 °F (37 °C)    TempSrc:   Oral    SpO2: 97% 95% 100% 96%   Weight:       Height:            Intake and Output:  Current Shift: No intake/output data recorded.  Last three shifts: No intake/output data recorded.    Physical Exam:   Physical Exam  Constitutional:       Appearance: She is ill-appearing.   HENT:      Head: Atraumatic.      Mouth/Throat:      Mouth: Mucous membranes are dry.      Comments: Congestion, with erosions, no thrush however  Neck:      Comments: Tracheostomy collar with secretion  Cardiovascular:      Rate and Rhythm: Regular rhythm.      Heart sounds:      Friction rub present.   Pulmonary:      Breath sounds: Normal breath sounds.   Abdominal:      General: Bowel sounds are normal.   Skin:     General: Skin is warm.   Neurological:      General: No focal deficit present.  the interest of transparency, however please be advised this is a medical document.  It is intended  as peer to peer communication. It is written in the medical language and may contain abbreviation or verbiage that are unfamiliar. It may appear blunt or direct.  Medical documents are intended to carry relevant information, facts as evident.  And the clinic opinions of the practitioner.  This note maybe transcribed  using a voice dictation system, voice-recognition errors may occur, this should not be taken to alter the contents or meaning for this note.    Cc Referring Physician   Lyly Bowman MD

## 2024-07-21 NOTE — PROGRESS NOTES
Hematology and Oncology Progress Note    Patient: April Mayo MRN: [id number]  SSN: [id number]    YOB: 1955  Age: 69 y.o.  Sex: female      Admit Date: 7/19/2024    LOS: 2 days     Chief Complaint: Patient still has pain while swallowing    Subjective:     Patient is not eating much by mouth.  She still has some pain while swallowing.  She does have right lower extremity swelling.  No nausea or vomiting today.  No mucosal bleeding.    Objective:     Vitals:    07/20/24 2019 07/21/24 0615 07/21/24 0641 07/21/24 0745   BP: 120/69 118/69  131/74   Pulse: 89 77  84   Resp: 18   18   Temp: 98.4 °F (36.9 °C) 98.2 °F (36.8 °C)  98.1 °F (36.7 °C)   TempSrc: Oral Oral  Oral   SpO2: 98% 100% 100% 100%   Weight:       Height:              Physical Exam:   Constitutional: Elderly -American female looks chronically ill.  Eyes: Sclerae anicteric. Conjunctivae no pallor.  ENMT: Oral mucosa is moist, no thrush, mucositis, or petechiae.  Neck: Has tracheostomy in place.  No lymphadenopathy  Respiratory: Lungs are clear bilaterally.  Cardiovascular: Normal sinus rhythm; no gallop or murmur.  Abdomen: Soft, nontender, no hepatosplenomegaly. No guarding or rigidity. Bowel sounds present.  Back/Spine: No spinal tenderness; no costovertebral angle tenderness.  Extremities: She has right lower extremity swelling which is not much change since admission.  Skin: No petechiae; no skin rash.  Neurologic: Alert/oriented x 3  Lab/Data Review:    Recent Results (from the past 24 hour(s))   Heparin, Anti-Xa    Collection Time: 07/20/24 11:46 AM   Result Value Ref Range    Heparin Xa,LMWH and Unfrac 0.65 IU/mL   Heparin, Anti-Xa    Collection Time: 07/20/24  6:24 PM   Result Value Ref Range    Heparin Xa,LMWH and Unfrac 0.72 IU/mL   Heparin, Anti-Xa    Collection Time: 07/20/24 11:42 PM   Result Value Ref Range    Heparin Xa,LMWH and Unfrac 0.60 IU/mL   Basic Metabolic Panel w/ Reflex to MG    Collection Time:

## 2024-07-21 NOTE — PROGRESS NOTES
Hospitalist Progress Note    NAME:   April Mayo   : 1955   MRN: [id number]     Date/Time: 2024 9:14 AM  Patient PCP: Lyly Bowman MD    Estimated discharge date:  Barriers:       Assessment / Plan:    SIRS  Low-grade fever with mild tachycardia and tachypnea without significant hypoxia.  No significant leukocytosis however left shift on differential is noted.  Patient is however significantly immunocompromised with recent chemotherapy and ongoing radiation therapy currently.  Likely secondary to right-sided pneumonia as evaluated on chest x-ray.  Obtain blood cultures x 2.     Acute hypoxic respiratory failure   -now requiring 10 L high flow via trach secondary to bilateral pulmonary emboli and severe aspiration pneumonia.  Chronic trach at home, secondary to laryngeal cancer.  Appreciate ENT consultation and patient may require PEG.    Aspiration pneumonia  As endorsed by patient that she may be choking on her oral intake.  Continue on IV Zosyn.  Continue on DuoNebs as well as pulmonology consultation.  Continue Symbicort, Zyrtec's and start on Mucinex.     Moderate burden bilateral acute pulmonary emboli   Continue heparin GTT.  No significant right heart strain noted on CT angiogram chest.  Pending echocardiogram.    Severe acute right-sided DVT  Per IR, CT abdomen/pelvis with IV dye pending.  Start patient on heparin GTT.     Severe dysphagia  Appreciate ENT and GI consultation.  MBS on .  Patient will likely require PEG.    Mild CHRISTEN  Creatinine mildly elevated to 1.33.  Baseline creatinine of 0.76 noted.  Start on gentle IV fluids.     Laryngeal cancer  On chemotherapy and radiation therapy per [facility].  Appreciate oncology consultation.    Hypertension  Hold patient's Cozaar given mild CHRISTEN.  Currently normotensive.     Full CODE STATUS      Medical Decision Making:   I personally reviewed labs:  I personally reviewed imaging:  I personally reviewed EKG:  Toxic drug monitoring:

## 2024-07-21 NOTE — PLAN OF CARE
Problem: Discharge Planning  Goal: Discharge to home or other facility with appropriate resources  7/21/2024 0251 by Onelia Johnson RN  Outcome: Progressing  7/20/2024 1815 by Yvette Serrano RN  Outcome: Progressing     Problem: Pain  Goal: Verbalizes/displays adequate comfort level or baseline comfort level  7/21/2024 0251 by Onelia Johnson RN  Outcome: Progressing  7/20/2024 1815 by Yvette Serrano RN  Outcome: Progressing     Problem: Skin/Tissue Integrity  Goal: Absence of new skin breakdown  Description: 1.  Monitor for areas of redness and/or skin breakdown  2.  Assess vascular access sites hourly  3.  Every 4-6 hours minimum:  Change oxygen saturation probe site  4.  Every 4-6 hours:  If on nasal continuous positive airway pressure, respiratory therapy assess nares and determine need for appliance change or resting period.  7/21/2024 0251 by Onelia Johnson RN  Outcome: Progressing  7/20/2024 1815 by Yvette Serrano RN  Outcome: Progressing     Problem: ABCDS Injury Assessment  Goal: Absence of physical injury  7/21/2024 0251 by Onelia Johnson RN  Outcome: Progressing  7/20/2024 1815 by Yvette Serrano RN  Outcome: Progressing     Problem: Safety - Adult  Goal: Free from fall injury  7/21/2024 0251 by Onelia Johnson RN  Outcome: Progressing  7/20/2024 1815 by Yvette Serrano RN  Outcome: Progressing

## 2024-07-21 NOTE — PROGRESS NOTES
RT Nebulizer Bronchodilator Protocol Note    There is a bronchodilator order in the chart from a provider indicating to follow the RT Bronchodilator Protocol and there is an “Initiate RT Bronchodilator Protocol” order as well (see protocol at bottom of note).    CXR Findings:  XR CHEST PORTABLE    Result Date: 7/19/2024  Increased airspace disease is present in the right base.  Electronically signed by ARMANDO TONY      The findings from the last RT Protocol Assessment were as follows:  Smoking: None or smoker <15 pack years  Respiratory Pattern: Dyspnea on exertion or RR 21-25 bpm  Breath Sounds: Slightly diminished and/or crackles  Cough: Strong, productive  Indication for Bronchodilator Therapy: Decreased or absent breath sounds  Bronchodilator Assessment Score: 5    Aerosolized bronchodilator medication orders have been revised according to the RT Nebulizer Bronchodilator Protocol below.    Respiratory Therapist to perform RT Therapy Protocol Assessment initially then follow the protocol.  Repeat RT Therapy Protocol Assessment PRN for score 0-3 or on second treatment, BID, and PRN for scores above 3.    No Indications - adjust the frequency to every 6 hours PRN wheezing or bronchospasm, if no treatments needed after 48 hours then discontinue using Per Protocol order mode.     If indication present, adjust the RT bronchodilator orders based on the Bronchodilator Assessment Score as indicated below.  If a patient is on this medication at home then do not decrease Frequency below that used at home.    0-3 - enter or revise RT bronchodilator order(s) to equivalent RT Bronchodilator order with Frequency of every 4 hours PRN for wheezing or increased work of breathing using Per Protocol order mode.       4-6 - enter or revise RT Bronchodilator order(s) to two equivalent RT bronchodilator orders with one order with BID Frequency and one order with Frequency of every 4 hours PRN wheezing or increased work of breathing

## 2024-07-21 NOTE — PROGRESS NOTES
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   female in consultation     Excerpts from admission 2024 or consult notes as follows:     April Mayo is a 69-year-old female with a history of post ablative hypothyroidism, vocal cord mass, dysphonia, neoplasm of uncertain behavior of the vocal cord, tracheostomy, laryngeal cancer, and aspiration pneumonia, who presented to the emergency department yesterday for difficulty eating, drinking, and throat pain secondary to radiation treatment.  Per the patient's daughter, the patient also experienced right leg swelling, and she reported a odor from her tracheostomy.  The patient's daughter also reports that last week the patient was diagnosed with pneumonia and is experiencing an increase in mucus secretions from the trach as well.        Allergies   Allergen Reactions    Codeine Other (See Comments)     NAUSEA, VOMITING         MAR reviewed and pertinent medications noted or modified as needed     Current Facility-Administered Medications   Medication Dose Route Frequency Provider Last Rate Last Admin    nystatin (MYCOSTATIN) 885347 UNIT/ML suspension 500,000 Units  5 mL Oral 4x Daily Collins Chacon MD   500,000 Units at 24 1417    heparin (porcine) injection 7,300 Units  80 Units/kg IntraVENous PRN Al  right small saphenous vein.  Chest x-ray done yesterday shows increased airspace disease in the right base.  Patient is receiving Symbicort, Zyrtec, Mucinex, Heparin, DuoNeb, Synthroid, Mycostatin, Morphine, Zosyn.   ENT evaluation  Supplemental O2 to keep sats > 93%  Aspiration precautions  Labs to follow electrolytes, renal function and and blood counts  Glucose monitoring and SSI  Bronchial hygiene with respiratory therapy techniques, bronchodilators  DVT, SUP prophylaxis  Pt needs IV fluids with additives and Drug therapy requiring intensive monitoring for toxicity  Prescription drug management with home med reconciliation reviewed     7/21 The patient is currently on 10L of high flow nasal cannula. The patient's chloride, BUN, BUN/Creatinine, Creatinine, are all elevated. The patient's WBC is within normal range, the patient's hemoglobin concentration is at 8.5 g/dL. The patient remains on Symbicort, Zyrtec, Mucinex, DuoNeb, Synthroid, Mycostatin, Zosyn.

## 2024-07-22 ENCOUNTER — APPOINTMENT (OUTPATIENT)
Facility: HOSPITAL | Age: 69
DRG: 871 | End: 2024-07-22
Payer: MEDICARE

## 2024-07-22 LAB
ANION GAP SERPL CALC-SCNC: 4 MMOL/L (ref 5–15)
BASOPHILS # BLD: 0 K/UL (ref 0–0.1)
BASOPHILS NFR BLD: 0 % (ref 0–1)
BUN SERPL-MCNC: 20 MG/DL (ref 6–20)
BUN/CREAT SERPL: 18 (ref 12–20)
CA-I BLD-MCNC: 8.4 MG/DL (ref 8.5–10.1)
CHLORIDE SERPL-SCNC: 116 MMOL/L (ref 97–108)
CO2 SERPL-SCNC: 25 MMOL/L (ref 21–32)
CREAT SERPL-MCNC: 1.09 MG/DL (ref 0.55–1.02)
DIFFERENTIAL METHOD BLD: ABNORMAL
EOSINOPHIL # BLD: 0 K/UL (ref 0–0.4)
EOSINOPHIL NFR BLD: 0 % (ref 0–7)
ERYTHROCYTE [DISTWIDTH] IN BLOOD BY AUTOMATED COUNT: 16.6 % (ref 11.5–14.5)
GLUCOSE SERPL-MCNC: 132 MG/DL (ref 65–100)
HCT VFR BLD AUTO: 26.3 % (ref 35–47)
HGB BLD-MCNC: 8.3 G/DL (ref 11.5–16)
IMM GRANULOCYTES # BLD AUTO: 0 K/UL
IMM GRANULOCYTES NFR BLD AUTO: 0 %
LYMPHOCYTES # BLD: 0.3 K/UL (ref 0.8–3.5)
LYMPHOCYTES NFR BLD: 3 % (ref 12–49)
MCH RBC QN AUTO: 29.7 PG (ref 26–34)
MCHC RBC AUTO-ENTMCNC: 31.6 G/DL (ref 30–36.5)
MCV RBC AUTO: 94.3 FL (ref 80–99)
METAMYELOCYTES NFR BLD MANUAL: 2 %
MONOCYTES # BLD: 0.9 K/UL (ref 0–1)
MONOCYTES NFR BLD: 9 % (ref 5–13)
MYELOCYTES NFR BLD MANUAL: 6 %
NEUTS BAND NFR BLD MANUAL: 6 % (ref 0–6)
NEUTS SEG # BLD: 7.9 K/UL (ref 1.8–8)
NEUTS SEG NFR BLD: 74 % (ref 32–75)
NRBC # BLD: 0.17 K/UL (ref 0–0.01)
NRBC BLD-RTO: 1.7 PER 100 WBC
PLATELET # BLD AUTO: 241 K/UL (ref 150–400)
PMV BLD AUTO: 10.7 FL (ref 8.9–12.9)
POTASSIUM SERPL-SCNC: 3.6 MMOL/L (ref 3.5–5.1)
RBC # BLD AUTO: 2.79 M/UL (ref 3.8–5.2)
RBC MORPH BLD: ABNORMAL
SODIUM SERPL-SCNC: 145 MMOL/L (ref 136–145)
UFH PPP CHRO-ACNC: 0.85 IU/ML
UFH PPP CHRO-ACNC: <0.1 IU/ML
UFH PPP CHRO-ACNC: <0.1 IU/ML
WBC # BLD AUTO: 9.9 K/UL (ref 3.6–11)

## 2024-07-22 PROCEDURE — 92611 MOTION FLUOROSCOPY/SWALLOW: CPT

## 2024-07-22 PROCEDURE — 94640 AIRWAY INHALATION TREATMENT: CPT

## 2024-07-22 PROCEDURE — 6370000000 HC RX 637 (ALT 250 FOR IP): Performed by: INTERNAL MEDICINE

## 2024-07-22 PROCEDURE — 85025 COMPLETE CBC W/AUTO DIFF WBC: CPT

## 2024-07-22 PROCEDURE — 36415 COLL VENOUS BLD VENIPUNCTURE: CPT

## 2024-07-22 PROCEDURE — 1100000000 HC RM PRIVATE

## 2024-07-22 PROCEDURE — 2580000003 HC RX 258: Performed by: INTERNAL MEDICINE

## 2024-07-22 PROCEDURE — 6360000002 HC RX W HCPCS: Performed by: INTERNAL MEDICINE

## 2024-07-22 PROCEDURE — 94669 MECHANICAL CHEST WALL OSCILL: CPT

## 2024-07-22 PROCEDURE — 2500000003 HC RX 250 WO HCPCS: Performed by: INTERNAL MEDICINE

## 2024-07-22 PROCEDURE — 94761 N-INVAS EAR/PLS OXIMETRY MLT: CPT

## 2024-07-22 PROCEDURE — 74230 X-RAY XM SWLNG FUNCJ C+: CPT

## 2024-07-22 PROCEDURE — 94667 MNPJ CHEST WALL 1ST: CPT

## 2024-07-22 PROCEDURE — 2700000000 HC OXYGEN THERAPY PER DAY

## 2024-07-22 PROCEDURE — 85520 HEPARIN ASSAY: CPT

## 2024-07-22 PROCEDURE — 6370000000 HC RX 637 (ALT 250 FOR IP): Performed by: EMERGENCY MEDICINE

## 2024-07-22 PROCEDURE — 80048 BASIC METABOLIC PNL TOTAL CA: CPT

## 2024-07-22 RX ORDER — HEPARIN SODIUM 10000 [USP'U]/100ML
5-30 INJECTION, SOLUTION INTRAVENOUS CONTINUOUS
Status: DISCONTINUED | OUTPATIENT
Start: 2024-07-22 | End: 2024-07-22

## 2024-07-22 RX ORDER — HEPARIN SODIUM 10000 [USP'U]/100ML
5-30 INJECTION, SOLUTION INTRAVENOUS CONTINUOUS
Status: DISCONTINUED | OUTPATIENT
Start: 2024-07-22 | End: 2024-07-25

## 2024-07-22 RX ADMIN — SODIUM CHLORIDE, PRESERVATIVE FREE 10 ML: 5 INJECTION INTRAVENOUS at 20:38

## 2024-07-22 RX ADMIN — IPRATROPIUM BROMIDE AND ALBUTEROL SULFATE 1 DOSE: 2.5; .5 SOLUTION RESPIRATORY (INHALATION) at 13:35

## 2024-07-22 RX ADMIN — IPRATROPIUM BROMIDE AND ALBUTEROL SULFATE 1 DOSE: 2.5; .5 SOLUTION RESPIRATORY (INHALATION) at 08:07

## 2024-07-22 RX ADMIN — NYSTATIN 500000 UNITS: 100000 SUSPENSION ORAL at 13:28

## 2024-07-22 RX ADMIN — POTASSIUM CHLORIDE, DEXTROSE MONOHYDRATE AND SODIUM CHLORIDE: 150; 5; 450 INJECTION, SOLUTION INTRAVENOUS at 18:48

## 2024-07-22 RX ADMIN — IPRATROPIUM BROMIDE AND ALBUTEROL SULFATE 1 DOSE: 2.5; .5 SOLUTION RESPIRATORY (INHALATION) at 19:39

## 2024-07-22 RX ADMIN — PIPERACILLIN AND TAZOBACTAM 4500 MG: 4; .5 INJECTION, POWDER, LYOPHILIZED, FOR SOLUTION INTRAVENOUS at 00:06

## 2024-07-22 RX ADMIN — BARIUM SULFATE 5 ML: 400 SUSPENSION ORAL at 11:32

## 2024-07-22 RX ADMIN — PIPERACILLIN AND TAZOBACTAM 4500 MG: 4; .5 INJECTION, POWDER, LYOPHILIZED, FOR SOLUTION INTRAVENOUS at 08:13

## 2024-07-22 RX ADMIN — BARIUM SULFATE 30 ML: 0.81 POWDER, FOR SUSPENSION ORAL at 11:32

## 2024-07-22 RX ADMIN — BARIUM SULFATE 5 ML: 400 SUSPENSION ORAL at 11:33

## 2024-07-22 RX ADMIN — BARIUM SULFATE 10 ML: 400 PASTE ORAL at 11:33

## 2024-07-22 RX ADMIN — PIPERACILLIN AND TAZOBACTAM 4500 MG: 4; .5 INJECTION, POWDER, LYOPHILIZED, FOR SOLUTION INTRAVENOUS at 16:44

## 2024-07-22 RX ADMIN — SODIUM CHLORIDE, PRESERVATIVE FREE 10 ML: 5 INJECTION INTRAVENOUS at 09:42

## 2024-07-22 RX ADMIN — HEPARIN SODIUM 18 UNITS/KG/HR: 10000 INJECTION, SOLUTION INTRAVENOUS at 15:18

## 2024-07-22 RX ADMIN — HEPARIN SODIUM 18 UNITS/KG/HR: 10000 INJECTION, SOLUTION INTRAVENOUS at 18:47

## 2024-07-22 RX ADMIN — Medication 2 PUFF: at 19:34

## 2024-07-22 RX ADMIN — POTASSIUM CHLORIDE, DEXTROSE MONOHYDRATE AND SODIUM CHLORIDE: 150; 5; 450 INJECTION, SOLUTION INTRAVENOUS at 00:07

## 2024-07-22 ASSESSMENT — PAIN SCALES - GENERAL
PAINLEVEL_OUTOF10: 0
PAINLEVEL_OUTOF10: 0

## 2024-07-22 NOTE — PROGRESS NOTES
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   female in consultation     Excerpts from admission 2024 or consult notes as follows:     April Mayo is a 69-year-old female with a history of post ablative hypothyroidism, vocal cord mass, dysphonia, neoplasm of uncertain behavior of the vocal cord, tracheostomy, laryngeal cancer, and aspiration pneumonia, who presented to the emergency department yesterday for difficulty eating, drinking, and throat pain secondary to radiation treatment.  Per the patient's daughter, the patient also experienced right leg swelling, and she reported a odor from her tracheostomy.  The patient's daughter also reports that last week the patient was diagnosed with pneumonia and is experiencing an increase in mucus secretions from the trach as well.        Allergies   Allergen Reactions    Codeine Other (See Comments)     NAUSEA, VOMITING         MAR reviewed and pertinent medications noted or modified as needed     Current Facility-Administered Medications   Medication Dose Route Frequency Provider Last Rate Last Admin    ipratropium 0.5 mg-albuterol 2.5 mg (DUONEB) nebulizer solution 1 Dose  1 Dose Inhalation Q6H WA RT Darek Moscoso MD   1 Dose at 24    sucralfate (CARAFATE) tablet 1 g  1 g Oral 3 times per day Ronal

## 2024-07-22 NOTE — PLAN OF CARE
SPEECH PATHOLOGY MODIFIED BARIUM SWALLOW STUDY  Patient: April Mayo (69 y.o. female)  Date: 7/22/2024  Primary Diagnosis: Aspiration pneumonia of right lower lobe due to vomit (HCC) [J69.0]       Precautions: aspiration                     DIET RECOMMENDATIONS:Full liquid and thin liquids    SWALLOW SAFETY PRECAUTIONS: 1:1 assistance with ALL PO intake, STRICT aspiration and GERD precautions, monitor pt closely for s/s aspiration, meds crushed if able in applesauce or pudding, FEED ONLY IF AWAKE AND ALERT.      ASSESSMENT :  Based on the objective data described below, the patient presents with mild oral dysphagia negatively impacted by fear of swallowing. Pharyngeal response largely WFL. No clinical indicators of penetration or aspiration observed.    Tracheostomy present. PMV donned. Patient w/ dysphonia s/t Laryngeal SCCA. Patient reported she completed CRT 7/18/24.   Patient reports continued patient when swallowing rating 4/10. She continues to be NPO.     Oral phase: adequate bolus acceptance. Transient bolus holding, more noted w/ thicker consistencies especially solids. Oral bolus fragmentation w/ multiple swallows elicited per bolus.  Mastication WFL. Swallow initiation timely.     Pharyngeal phase: hyolaryngeal excursion and protraction slightly reduced. Epiglottic inversion intact w/ thickening of epiglottis noted. Slightly reduced bolus pressure drive w/ solids and pudding consistency, resulting in hypopharyngeal residue. This clears w/ of double swallow.  There is no penetration or aspiration observed w/ all consistencies administered.   W/ initiation swallow of thin, patient elicited immediate cough response w/ oral regurgitation and re-swallow. There was no penetration or aspiration observed. Repeated thin swallows, largely WFL. Patient did appear vary anxious during MBS.   Trance residue in the pyriforms after the swallow.     UES: decreased duration and relaxation of this segment w/ slower  teaspoon;Moderately Thick - teaspoon;Thin - cup    Presentation:  How Presented: Self-fed/presented;SLP-fed/Presented;Cup/sip;Spoon    Bolus acceptance:  Bolus Acceptance: No impairment    Bolus formation/control:    Bolus Formation/Control: Impaired  Propulsion: Delayed (# of seconds)    Oral Residue:    Oral Residue: None    Pharyngeal Phase Initiation:  Initiation of Swallow: Triggered at base of tongue    Timing:  Timing: No impairment  Penetration:  Penetration: None       Consistency(ies) penetrated: none  Aspiration:   Aspiration/Timing: No evidence of aspiration        Consistency(ies) aspirated: none  Pharyngeal clearance:   Pharyngeal Clearance: Pyriform residue;Vallecular residue;Less than 10%  Attempted Modifications:   Attempted Modifications: Small sips and bites;Effortful swallow;Double swallow    Effective Modifications:      Cues:  Cues: Minimal-moderate    Swallow Physiology:  Decreased Tongue Base Retraction?: No  Laryngeal Elevation: WFL (within functional limits)       Pharyngoesophageal Segment:  Pharyngeal-Esophageal Segment: Decreased relaxation of upper esophageal segment;Suspected esophageal dysphagia    Pharyngeal Dysfunction:       Dysphagia Rating:    Oral phase: Oral Phase Severity: Mild    Pharyngeal Phase: Pharyngeal Phase Severity: Minimal    Upper Esophageal Screen:   Esophageal Screen: Impaired  Upper Esophageal Screen- Major Contributing Deficits  Decreased Esophageal Clearance: slow bolus clearance observed          Comments:        Penetration-Aspiration Scale (PAS)  Penetration-Aspiration Scale (PAS): 1 - Material does not enter the airway  Penetration-Aspiration Scale (PAS): 1 - Material does not enter the airway    COMMUNICATION/EDUCATION:   Swallow safety precautions, Aspiration precautions, GERD precautions, Diet recommendations, Compensatory strategies, and Prognosis and SLP POC education provided to Patient via explanation, all questions/concerns addressed. Patient

## 2024-07-22 NOTE — PROGRESS NOTES
Hematology/Oncology   Progress Note    Patient: April Mayo MRN: [id number]     YOB: 1955  Age: 69 y.o.  Sex: female      Admit Date: 7/19/2024    LOS: 3 days     Chief Complaint: Dysphagia    Subjective:   Reports dysphagia.  Unable to eat solids or liquids.    Constitutional No fevers, chills, night sweats, excessive fatigue or weight loss.   Allergic/Immunologic No recent allergic reactions   Eyes No significant visual difficulties. No diplopia.   ENMT No problems with hearing, no sore throat, no sinus drainage.   Endocrine No hot flashes or night sweats. No cold intolerance, polyuria, or polydipsia   Hematologic/Lymphatic No easy bruising or bleeding.  The patient denies any tender or palpable lymph nodes   Breasts No abnormal masses of breast, nipple discharge or pain.   Respiratory No dyspnea on exertion, orthopnea, chest pain, cough or hemoptysis.   Cardiovascular No anginal chest pain, irregular heart beat, tachycardia, palpitations or orthopnea.   Gastrointestinal No nausea, vomiting, diarrhea, constipation, cramping, dysphagia, reflux, heartburn, GI bleeding, or early satiety.  No change in bowel habits.   Genitourinary (M) No hematuria, dysuria, increased frequency, urgency, hesitancy or incontinence.   Musculoskeletal No joint pain, swelling or redness. No decreased range of motion.   Integumentary No chronic rashes, inflammation, ulcerations, pruritus, petechiae, purpura, ecchymoses, or skin changes.   Neurologic No headache, blurred vision, and no areas of focal weakness or numbness. Normal gait. No sensory problems.   Psychiatric No insomnia, depression, adis or mood swings.  No psychotropic drugs.        Current Facility-Administered Medications:     ipratropium 0.5 mg-albuterol 2.5 mg (DUONEB) nebulizer solution 1 Dose, 1 Dose, Inhalation, Q6H WA RT, Darek Moscoso MD, 1 Dose at 07/22/24 1335    sucralfate (CARAFATE) tablet 1 g, 1 g, Oral, 3 times per day, Geronimo Villeda MD         E/E' Septal 9.29     LVOT Stroke Volume Index 26.1 mL/m2    LA Volume Index MOD A4C 9 (A) 16 - 34 ml/m2    LA Size Index 1.55 cm/m2    LA/AO Root Ratio 1.07     RA Volume Index A4C 17 mL/m2    Ao Root Index 1.45 cm/m2    Ascending Aorta Index 1.45 cm/m2    AV Velocity Ratio 0.80     LVOT:AV VTI Index 0.75     JOSE/BSA VTI 1.0 cm2/m2    JOSE/BSA Peak Velocity 1.0 cm2/m2    MV:LVOT VTI Index 1.49    Basic Metabolic Panel w/ Reflex to MG    Collection Time: 07/22/24  8:20 AM   Result Value Ref Range    Sodium 145 136 - 145 mmol/L    Potassium 3.6 3.5 - 5.1 mmol/L    Chloride 116 (H) 97 - 108 mmol/L    CO2 25 21 - 32 mmol/L    Anion Gap 4 (L) 5 - 15 mmol/L    Glucose 132 (H) 65 - 100 mg/dL    BUN 20 6 - 20 mg/dL    Creatinine 1.09 (H) 0.55 - 1.02 mg/dL    BUN/Creatinine Ratio 18 12 - 20      Est, Glom Filt Rate 55 (L) >60 ml/min/1.73m2    Calcium 8.4 (L) 8.5 - 10.1 mg/dL   CBC with Auto Differential    Collection Time: 07/22/24  8:20 AM   Result Value Ref Range    WBC 9.9 3.6 - 11.0 K/uL    RBC 2.79 (L) 3.80 - 5.20 M/uL    Hemoglobin 8.3 (L) 11.5 - 16.0 g/dL    Hematocrit 26.3 (L) 35.0 - 47.0 %    MCV 94.3 80.0 - 99.0 FL    MCH 29.7 26.0 - 34.0 PG    MCHC 31.6 30.0 - 36.5 g/dL    RDW 16.6 (H) 11.5 - 14.5 %    Platelets 241 150 - 400 K/uL    MPV 10.7 8.9 - 12.9 FL    Nucleated RBCs 1.7 (H) 0.0  WBC    nRBC 0.17 (H) 0.00 - 0.01 K/uL    Neutrophils % 74 32 - 75 %    Band Neutrophils 6 0 - 6 %    Lymphocytes % 3 (L) 12 - 49 %    Monocytes % 9 5 - 13 %    Eosinophils % 0 0 - 7 %    Basophils % 0 0 - 1 %    Metamyelocytes 2 (H) 0 %    Myelocytes 6 (H) 0 %    Immature Granulocytes % 0 %    Neutrophils Absolute 7.9 1.8 - 8.0 K/UL    Lymphocytes Absolute 0.3 (L) 0.8 - 3.5 K/UL    Monocytes Absolute 0.9 0.0 - 1.0 K/UL    Eosinophils Absolute 0.0 0.0 - 0.4 K/UL    Basophils Absolute 0.0 0.0 - 0.1 K/UL    Immature Granulocytes Absolute 0.0 K/UL    Differential Type Manual      RBC Comment Normocytic, Normochromic

## 2024-07-22 NOTE — PROGRESS NOTES
Gastroenterology Progress Note        Patient: April Mayo MRN: [id number]  SSN: [id number]    YOB: 1955  Age: 69 y.o.  Sex: female      Admit Date: 7/19/2024    LOS: 3 days     Subjective:   Patient not feels well, minimal p.o. intake and has Swallow test this am   Difficult and painful to swallow    Current Facility-Administered Medications:     heparin 25,000 units in dextrose 5% 250 mL (premix) infusion, 5-30 Units/kg/hr, IntraVENous, Continuous, Chavo Rodriguez MD    ipratropium 0.5 mg-albuterol 2.5 mg (DUONEB) nebulizer solution 1 Dose, 1 Dose, Inhalation, Q6H WA RT, Darek Moscoso MD, 1 Dose at 07/22/24 1335    sucralfate (CARAFATE) tablet 1 g, 1 g, Oral, 3 times per day, Geronimo Villeda MD    nystatin (MYCOSTATIN) 119309 UNIT/ML suspension 500,000 Units, 5 mL, Oral, 4x Daily, Collins Chcaon MD, 500,000 Units at 07/22/24 1328    dextrose 5 % and 0.45 % NaCl with KCl 20 mEq infusion, , IntraVENous, Continuous, Chavo Rodriguez MD, Last Rate: 100 mL/hr at 07/22/24 0007, New Bag at 07/22/24 0007    sodium chloride flush 0.9 % injection 5-40 mL, 5-40 mL, IntraVENous, 2 times per day, Chavo Rodriguez MD, 10 mL at 07/22/24 0942    sodium chloride flush 0.9 % injection 5-40 mL, 5-40 mL, IntraVENous, PRN, Chavo Rodriguez MD    0.9 % sodium chloride infusion, , IntraVENous, PRN, Chavo Rodriguez MD    ondansetron (ZOFRAN-ODT) disintegrating tablet 4 mg, 4 mg, Oral, Q8H PRN **OR** ondansetron (ZOFRAN) injection 4 mg, 4 mg, IntraVENous, Q6H PRN, Chavo Rodriguez MD    polyethylene glycol (GLYCOLAX) packet 17 g, 17 g, Oral, Daily PRN, Chavo Rodriguez MD    acetaminophen (TYLENOL) tablet 650 mg, 650 mg, Oral, Q6H PRN **OR** acetaminophen (TYLENOL) suppository 650 mg, 650 mg, Rectal, Q6H PRN, Chavo Rodriguez MD    guaiFENesin (MUCINEX) extended release tablet 600 mg, 600 mg, Oral, BID, Chavo Rodriguez MD    [COMPLETED] piperacillin-tazobactam (ZOSYN) 4,500 mg in sodium chloride 0.9 % 100 mL  Panel w/ Reflex to MG    Collection Time: 07/22/24  8:20 AM   Result Value Ref Range    Sodium 145 136 - 145 mmol/L    Potassium 3.6 3.5 - 5.1 mmol/L    Chloride 116 (H) 97 - 108 mmol/L    CO2 25 21 - 32 mmol/L    Anion Gap 4 (L) 5 - 15 mmol/L    Glucose 132 (H) 65 - 100 mg/dL    BUN 20 6 - 20 mg/dL    Creatinine 1.09 (H) 0.55 - 1.02 mg/dL    BUN/Creatinine Ratio 18 12 - 20      Est, Glom Filt Rate 55 (L) >60 ml/min/1.73m2    Calcium 8.4 (L) 8.5 - 10.1 mg/dL   CBC with Auto Differential    Collection Time: 07/22/24  8:20 AM   Result Value Ref Range    WBC 9.9 3.6 - 11.0 K/uL    RBC 2.79 (L) 3.80 - 5.20 M/uL    Hemoglobin 8.3 (L) 11.5 - 16.0 g/dL    Hematocrit 26.3 (L) 35.0 - 47.0 %    MCV 94.3 80.0 - 99.0 FL    MCH 29.7 26.0 - 34.0 PG    MCHC 31.6 30.0 - 36.5 g/dL    RDW 16.6 (H) 11.5 - 14.5 %    Platelets 241 150 - 400 K/uL    MPV 10.7 8.9 - 12.9 FL    Nucleated RBCs 1.7 (H) 0.0  WBC    nRBC 0.17 (H) 0.00 - 0.01 K/uL    Neutrophils % 74 32 - 75 %    Band Neutrophils 6 0 - 6 %    Lymphocytes % 3 (L) 12 - 49 %    Monocytes % 9 5 - 13 %    Eosinophils % 0 0 - 7 %    Basophils % 0 0 - 1 %    Metamyelocytes 2 (H) 0 %    Myelocytes 6 (H) 0 %    Immature Granulocytes % 0 %    Neutrophils Absolute 7.9 1.8 - 8.0 K/UL    Lymphocytes Absolute 0.3 (L) 0.8 - 3.5 K/UL    Monocytes Absolute 0.9 0.0 - 1.0 K/UL    Eosinophils Absolute 0.0 0.0 - 0.4 K/UL    Basophils Absolute 0.0 0.0 - 0.1 K/UL    Immature Granulocytes Absolute 0.0 K/UL    Differential Type Manual      RBC Comment Normocytic, Normochromic     Heparin, Anti-XA    Collection Time: 07/22/24 12:28 PM   Result Value Ref Range    Heparin Xa,LMWH and Unfrac <0.10 IU/mL        FL MODIFIED BARIUM SWALLOW W VIDEO   Preliminary Result   Modified barium swallow as above.  Please see the separately   dictated Speech Pathology report for additional details and recommendations.      CT ABDOMEN PELVIS W IV CONTRAST Additional Contrast? None   Final Result   1.  Moderate  penetration, aspiration or  significant retention in the vallecula or piriform sinuses.  Plan:   Continue current antibiotic treatment  IV hydration withIV fluids  Carafate as ordered, (pill resolved in the water before swallow)  Speech therapy to follow the patient, provided for  brium modified swallow in the morning       tentatively schedule patient for  PEG tube placemen in am for acute  s/p radiaiton esophagitis Moderate to severe malnutrition.  Discussed with patient's sister, and patient    Signed By: Geronimo Villeda MD     July 22, 2024          Thank you for allowing me to participate in this patients care    DISCLAIMER:  Please note that this report was generated to utilize Dragon Dictation system, the software is designed to speed over the accuracy.  Every effort has been done to attempt to correct this mistakes upon review, but there still might be some inadvertent errors in grammar and spelling.  Please utilize caution while reading the report due to this  inherent and potential for grammatical mistakes.    This physician  works as a inpatient consult gastroenterologist only, any result not available at time of inpatient discharge and/or clinical follow-up should be managed by the primary care physician or patient's primary gastroenterologist.  It is responsibility of hospitalitis/admitting physician to forward the discharge summary to patient's primary care physician and the primary gastroenterologist regarding further follow-up plan after patient be discharged.    Note to patient:  The 21 st century Cures Act make the medical notes like this available to patient in the interest of transparency, however please be advised this is a medical document.  It is intended  as peer to peer communication. It is written in the medical language and may contain abbreviation or verbiage that are unfamiliar. It may appear blunt or direct.  Medical documents are intended to carry relevant information, facts as evident.

## 2024-07-22 NOTE — PLAN OF CARE
Problem: Discharge Planning  Goal: Discharge to home or other facility with appropriate resources  7/21/2024 2217 by Luz Wilkes RN  Outcome: Progressing  7/21/2024 1657 by Yvette Serrano RN  Outcome: Progressing     Problem: Pain  Goal: Verbalizes/displays adequate comfort level or baseline comfort level  7/21/2024 2217 by Luz Wilkes RN  Outcome: Progressing  7/21/2024 1657 by Yvette Serrano RN  Outcome: Progressing     Problem: Skin/Tissue Integrity  Goal: Absence of new skin breakdown  Description: 1.  Monitor for areas of redness and/or skin breakdown  2.  Assess vascular access sites hourly  3.  Every 4-6 hours minimum:  Change oxygen saturation probe site  4.  Every 4-6 hours:  If on nasal continuous positive airway pressure, respiratory therapy assess nares and determine need for appliance change or resting period.  7/21/2024 2217 by Luz Wilkes RN  Outcome: Progressing  7/21/2024 1657 by Yvette Serrano RN  Outcome: Progressing     Problem: ABCDS Injury Assessment  Goal: Absence of physical injury  7/21/2024 2217 by Luz Wilkes RN  Outcome: Progressing  7/21/2024 1657 by Yvette Serrano RN  Outcome: Progressing     Problem: Safety - Adult  Goal: Free from fall injury  7/21/2024 2217 by Luz Wilkes RN  Outcome: Progressing  7/21/2024 1657 by Yvette Serrano RN  Outcome: Progressing

## 2024-07-22 NOTE — PROGRESS NOTES
Comprehensive Nutrition Assessment    Type and Reason for Visit:  Initial, Positive Nutrition Screen    Nutrition Recommendations/Plan:   Diet per SLP  Initiate ONS [employer] 3x/day  Monitor PO intake, ONS intake/tolerance and BM in I/O's.      Malnutrition Assessment:  Malnutrition Status:  Moderate malnutrition (07/22/24 1427)    Context:  Chronic Illness     Findings of the 6 clinical characteristics of malnutrition:  Energy Intake:  75% or less estimated energy requirements for 1 month or longer  Weight Loss:  7.5% over 3 months     Body Fat Loss:  Unable to assess     Muscle Mass Loss:  Unable to assess    Fluid Accumulation:  Unable to assess     Strength:  Not Performed    Nutrition Assessment:    RD assessed for MST 4. Spoke w pt and family who reports a decreased appetite and intake for the past 3 weeks. The past 2 weeks have mostly only consumed ice chips. Seen by speech and diet has been advance from NPO ti full liquids. Plan initiate ONS [employer] 3x/day to address decreased intake, pt agreeable. Meds reviewed. Abnormal labs include , Creatinine 1.09, GFR 55, Glucose 132, Calcium 8.4, Hgb 8.4, HCt 26.3.    Nutrition Related Findings:    NFPE defereed, observed as nourished. No N/V/C/D reported.SLP tx ongoing for trach and PO diet, current full liquid diet. LBM 7/22. Wound Type: Surgical Incision       Current Nutrition Intake & Therapies:    Average Meal Intake: 1-25%  Average Supplements Intake: None Ordered  ADULT DIET; Full Liquid    Anthropometric Measures:  Height: 160 cm (5' 3\")  Ideal Body Weight (IBW): 115 lbs (52 kg)       Current Body Weight: 103 kg (227 lb 1.2 oz) (RD obtained 7/22),   IBW. Weight Source: Bed Scale  Current BMI (kg/m2): 40.2  Usual Body Weight: 110.9 kg (244 lb 7.8 oz) (From previous visit, RD obtained 3/21)  % Weight Change (Calculated): -7.1                    BMI Categories: Obese Class 3 (BMI 40.0 or greater)    Estimated Daily Nutrient Needs:  Energy

## 2024-07-22 NOTE — PROGRESS NOTES
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   female in consultation     Excerpts from admission 2024 or consult notes as follows:     April Mayo is a 69-year-old female with a history of post ablative hypothyroidism, vocal cord mass, dysphonia, neoplasm of uncertain behavior of the vocal cord, tracheostomy, laryngeal cancer, and aspiration pneumonia, who presented to the emergency department yesterday for difficulty eating, drinking, and throat pain secondary to radiation treatment.  Per the patient's daughter, the patient also experienced right leg swelling, and she reported a odor from her tracheostomy.  The patient's daughter also reports that last week the patient was diagnosed with pneumonia and is experiencing an increase in mucus secretions from the trach as well.        Allergies   Allergen Reactions    Codeine Other (See Comments)     NAUSEA, VOMITING         MAR reviewed and pertinent medications noted or modified as needed     Current Facility-Administered Medications   Medication Dose Route Frequency Provider Last Rate Last Admin    ipratropium 0.5 mg-albuterol 2.5 mg (DUONEB) nebulizer solution 1 Dose  1 Dose Inhalation Q6H WA RT Darek Moscoso MD   1 Dose at 24 0807    sucralfate (CARAFATE) tablet 1 g  1 g Oral 3 times per day Ronal  therapy requiring intensive monitoring for toxicity  Pt is unstable, unpredictable needing inpatient monitoring; is acutely ill and at high risk of sudden decline and decompensation with severe consequenses and continued end organ dysfunction and failure  Prognosis guarded       RECOMMENDATIONS/PLAN:     69-year-old lady came in because of shortness on the dyspnea she has tracheostomy because 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  The patient's white blood cell count is within normal range, but his hemoglobin is decreased at 9.1 g/dL.  The patient's CT of the chest shows Moderate burden of central pulmonary emboli involving distal main, lobar, and segmental arteries of all 5 lobes, right greater than left. No evidence of right heart strain on CT. Right lower lobe pneumonia with adjacent loculated parapneumonic effusion versus empyema. A 1.5 cm metallic foreign body in the cecum could be a PillCam or other ingested foreign body. Bilateral common femoral and external iliac vein DVT. Indeterminate 6mm right upper lobe nodule.  CT of the abdomen and pelvis shows Moderate burden of central pulmonary emboli involving distal main, lobar, and segmental arteries of all 5 lobes, right greater than left. No evidence of right heart strain on CT. Right lower lobe pneumonia with adjacent loculated parapneumonic effusion versus empyema. A 1.5 cm metallic foreign body in the cecum could be a PillCam or other ingested foreign body. Bilateral common femoral and external iliac vein DVT. Indeterminate 6mm right upper lobe nodule.  A vascular duplex of the lower extremity shows Acute non-occlusive deep vein thrombosis in the right external iliac vein. Acute non-occlusive deep vein thrombosis in the right common femoral vein. Acute  occlusive deep vein thrombosis in the right femoral vein. Acute occlusive deep vein thrombosis in the right popliteal vein. Acute deep vein thrombosis in the right gastrocnemius vein. Acute deep vein thrombosis in the right posterior tibial vein. Acute deep vein thrombosis in the right peroneal vein. Acute superficial vein thrombosis in the right small saphenous vein.  Chest x-ray done yesterday shows increased airspace disease in the right base.  Patient is receiving Symbicort, Zyrtec, Mucinex, Heparin, DuoNeb, Synthroid, Mycostatin, Morphine, Zosyn.   ENT evaluation  Pt needs IV fluids with additives and Drug therapy requiring intensive monitoring for toxicity  Prescription drug management with home med reconciliation reviewed     7/21 The patient is currently on 10L of high flow nasal cannula 40% FiO2 the patient's chloride, BUN, BUN/Creatinine, Creatinine, are all elevated. The patient's WBC is within normal range, the patient's hemoglobin concentration is at 8.5 g/dL. The patient remains on Symbicort, Zyrtec, Mucinex, DuoNeb, Synthroid, Mycostatin, Zosyn.  ENT seen the patient and also GI for possible PEG tube placement patient is scheduled for modified barium swallow on Monday, 2D echo pending to see the right heart pressure CAT scan did not show any right heart strain    7/22 the patient is currently receiving 10 L of oxygen through her tracheostomy.  Patient had a echocardiogram done yesterday which shows  Left Ventricle: Normal left ventricular systolic function with a visually estimated EF of 60 - 65%. Left ventricle size is normal. Normal wall thickness. Normal wall motion. Normal diastolic function. Pericardium: Trivial pericardial effusion present. The Right ventricle size is normal. Normal systolic function, TAPSE is 2.2cm. RV peak S is 18cm/s. Right atrium is normal size. No indication of cardiac tamponade. Image quality is technically difficult. Procedure performed with the patient in a sitting

## 2024-07-22 NOTE — PLAN OF CARE
Problem: Discharge Planning  Goal: Discharge to home or other facility with appropriate resources  Outcome: Progressing     Problem: Pain  Goal: Verbalizes/displays adequate comfort level or baseline comfort level  Outcome: Progressing     Problem: Skin/Tissue Integrity  Goal: Absence of new skin breakdown  Description: 1.  Monitor for areas of redness and/or skin breakdown  2.  Assess vascular access sites hourly  3.  Every 4-6 hours minimum:  Change oxygen saturation probe site  4.  Every 4-6 hours:  If on nasal continuous positive airway pressure, respiratory therapy assess nares and determine need for appliance change or resting period.  Outcome: Progressing     Problem: ABCDS Injury Assessment  Goal: Absence of physical injury  Outcome: Progressing     Problem: Safety - Adult  Goal: Free from fall injury  Outcome: Progressing     Problem: SLP Adult - Impaired Swallowing  Goal: By Discharge: Advance to least restrictive diet without signs or symptoms of aspiration for planned discharge setting.  See evaluation for individualized goals.  Description: Speech Therapy Goals  Initiated 7/20/2024  -Patient stated goal: \"I don't want to cough when eating/drinking\"  -Patient will participate in modified barium swallow study within 7 day(s).         [ ] Not met  [ ]  MET   [ ] Progressing  [ ] Discontinue  -Patient will demonstrate understanding of swallow safety precautions and aspiration precautions, diet recs within 7 day(s).        [ ] Not met  [ ]  MET   [ ] Progressing  [ ] Discontinue   7/22/2024 1105 by Niharika Stuart, SLP  Outcome: Progressing

## 2024-07-22 NOTE — PROGRESS NOTES
Hospitalist Progress Note    NAME:   April Mayo   : 1955   MRN: [id number]     Date/Time: 2024 9:12 AM  Patient PCP: Lyly Bowman MD    Estimated discharge date:  Barriers:       Assessment / Plan:    SIRS  Low-grade fever with mild tachycardia and tachypnea without significant hypoxia.  No significant leukocytosis however left shift on differential is noted.  Patient is however significantly immunocompromised with recent chemotherapy and ongoing radiation therapy currently.  Likely secondary to right-sided pneumonia as evaluated on chest x-ray.  Obtain blood cultures x 2.     Acute hypoxic respiratory failure   - now requiring 10 L high flow via trach secondary to bilateral pulmonary emboli and severe aspiration pneumonia.  Chronic trach at home, secondary to laryngeal cancer.  Appreciate ENT consultation and patient may require PEG.    Aspiration pneumonia  As endorsed by patient that she may be choking on her oral intake.  Continue on IV Zosyn.  Continue on DuoNebs as well as pulmonology consultation.  Continue Symbicort, Zyrtec's and start on Mucinex.     Moderate burden bilateral acute pulmonary emboli   Continue heparin GTT.  No significant right heart strain noted on CT angiogram chest.  Echocardiogram without any significant right heart strain.  Switch from heparin GTT to Eliquis p.o. after PEG placement.    Severe acute right-sided DVT  Per IR, CT abdomen/pelvis with IV dye pending.  Cont' on heparin GTT.     Severe dysphagia  Appreciate ENT and GI consultation.  MBS on  a.m.  Patient will likely require PEG.    Mild CHRISTEN  Creatinine mildly elevated to 1.33.  Baseline creatinine of 0.76 noted.  Cont' gentle IV fluids.     Laryngeal cancer  On chemotherapy and radiation therapy per [facility].  Appreciate oncology consultation.    Hypertension  Hold patient's Cozaar given mild CHRISTEN.  Currently normotensive.     Full CODE STATUS      Medical Decision Making:   I personally reviewed  trach.  Continues to have cough without any chest pain.  Now requiring 10 L high flow via trach with worsening hypoxia noted.  No overnight fever/chills, nausea/vomiting, abdominal pain noted.    Counseled on significant bilateral pulmonary emboli also noted on CT angiogram chest along with significant right-sided aspiration pneumonia.  Will continue IV antibiotics as well as heparin GTT.          7/22  Patient is alert and oriented x 3 however unable to verbalize secondary to trach.  Continues to have cough without any chest pain.  Requiring 10 L high flow via trach.  No overnight fever/chills, nausea/vomiting, abdominal pain noted.    Counseled patient's sister at bedside extensively regarding further evaluation by GI for likely PEG placement with MBS still pending this a.m.  Also counseled on continuing need for heparin GTT until PEG is placed then transition to OAC.  Patient will also require further evaluation by PT/OT once patient's hypoxia has improved and patient has been cleared by pulmonology and GI.      Objective:     VITALS:   Last 24hrs VS reviewed since prior progress note. Most recent are:  Patient Vitals for the past 24 hrs:   BP Temp Temp src Pulse Resp SpO2   07/22/24 0811 (!) 147/84 98.2 °F (36.8 °C) Oral 74 18 98 %   07/22/24 0808 -- -- -- -- -- 100 %   07/22/24 0353 -- -- -- -- -- 100 %   07/22/24 0328 (!) 143/73 98.4 °F (36.9 °C) Oral 87 19 100 %   07/22/24 0108 139/80 97.7 °F (36.5 °C) Oral 83 -- 100 %   07/21/24 2354 -- -- -- -- -- 97 %   07/21/24 2013 134/66 98.8 °F (37.1 °C) Oral 82 19 100 %   07/21/24 2010 -- -- -- -- -- 98 %   07/21/24 1730 138/81 98.4 °F (36.9 °C) Oral 84 18 98 %   07/21/24 1541 -- -- -- 87 18 96 %   07/21/24 1314 -- -- -- 84 18 96 %   07/21/24 1245 136/75 98.6 °F (37 °C) Oral 90 18 100 %   07/21/24 1150 -- -- -- 81 18 95 %         Intake/Output Summary (Last 24 hours) at 7/22/2024 0912  Last data filed at 7/22/2024 0407  Gross per 24 hour   Intake 200 ml   Output 550

## 2024-07-22 NOTE — PROGRESS NOTES
PT eval order received and acknowledged. PT eval attempted at 1123 however pt declined d/t fatigue and just returned to bed from BS with nsg. Pt educated on the importance of continued mobility and LE HEP, pt requested PT return at a later time. Will continue to follow patient and attempt PT eval at a later time. Thank you.

## 2024-07-23 ENCOUNTER — ANESTHESIA (OUTPATIENT)
Facility: HOSPITAL | Age: 69
End: 2024-07-23
Payer: MEDICARE

## 2024-07-23 ENCOUNTER — ANESTHESIA EVENT (OUTPATIENT)
Facility: HOSPITAL | Age: 69
End: 2024-07-23
Payer: MEDICARE

## 2024-07-23 LAB
ALBUMIN SERPL-MCNC: 2.1 G/DL (ref 3.5–5)
ALBUMIN/GLOB SERPL: 0.5 (ref 1.1–2.2)
ALP SERPL-CCNC: 79 U/L (ref 45–117)
ALT SERPL-CCNC: 17 U/L (ref 12–78)
ANION GAP SERPL CALC-SCNC: 5 MMOL/L (ref 5–15)
AST SERPL W P-5'-P-CCNC: 19 U/L (ref 15–37)
BASOPHILS # BLD: 0 K/UL (ref 0–0.1)
BASOPHILS NFR BLD: 0 % (ref 0–1)
BILIRUB SERPL-MCNC: 0.8 MG/DL (ref 0.2–1)
BUN SERPL-MCNC: 11 MG/DL (ref 6–20)
BUN/CREAT SERPL: 11 (ref 12–20)
CA-I BLD-MCNC: 8 MG/DL (ref 8.5–10.1)
CHLORIDE SERPL-SCNC: 117 MMOL/L (ref 97–108)
CO2 SERPL-SCNC: 24 MMOL/L (ref 21–32)
CREAT SERPL-MCNC: 0.96 MG/DL (ref 0.55–1.02)
DIFFERENTIAL METHOD BLD: ABNORMAL
EOSINOPHIL # BLD: 0 K/UL (ref 0–0.4)
EOSINOPHIL NFR BLD: 0 % (ref 0–7)
ERYTHROCYTE [DISTWIDTH] IN BLOOD BY AUTOMATED COUNT: 16.7 % (ref 11.5–14.5)
GLOBULIN SER CALC-MCNC: 4.3 G/DL (ref 2–4)
GLUCOSE SERPL-MCNC: 104 MG/DL (ref 65–100)
HCT VFR BLD AUTO: 28.1 % (ref 35–47)
HGB BLD-MCNC: 9 G/DL (ref 11.5–16)
IMM GRANULOCYTES # BLD AUTO: 0 K/UL
IMM GRANULOCYTES NFR BLD AUTO: 0 %
LYMPHOCYTES # BLD: 0.5 K/UL (ref 0.8–3.5)
LYMPHOCYTES NFR BLD: 5 % (ref 12–49)
MCH RBC QN AUTO: 30.1 PG (ref 26–34)
MCHC RBC AUTO-ENTMCNC: 32 G/DL (ref 30–36.5)
MCV RBC AUTO: 94 FL (ref 80–99)
MONOCYTES # BLD: 0.8 K/UL (ref 0–1)
MONOCYTES NFR BLD: 8 % (ref 5–13)
MYELOCYTES NFR BLD MANUAL: 2 %
NEUTS BAND NFR BLD MANUAL: 1 % (ref 0–6)
NEUTS SEG # BLD: 8.2 K/UL (ref 1.8–8)
NEUTS SEG NFR BLD: 84 % (ref 32–75)
NRBC # BLD: 0.27 K/UL (ref 0–0.01)
NRBC BLD-RTO: 2.8 PER 100 WBC
PLATELET # BLD AUTO: 281 K/UL (ref 150–400)
PMV BLD AUTO: 10.6 FL (ref 8.9–12.9)
POTASSIUM SERPL-SCNC: 3.6 MMOL/L (ref 3.5–5.1)
PROT SERPL-MCNC: 6.4 G/DL (ref 6.4–8.2)
RBC # BLD AUTO: 2.99 M/UL (ref 3.8–5.2)
RBC MORPH BLD: ABNORMAL
SODIUM SERPL-SCNC: 146 MMOL/L (ref 136–145)
UFH PPP CHRO-ACNC: 1.07 IU/ML
UFH PPP CHRO-ACNC: <0.1 IU/ML
WBC # BLD AUTO: 9.7 K/UL (ref 3.6–11)

## 2024-07-23 PROCEDURE — 2580000003 HC RX 258: Performed by: INTERNAL MEDICINE

## 2024-07-23 PROCEDURE — 6360000002 HC RX W HCPCS: Performed by: NURSE ANESTHETIST, CERTIFIED REGISTERED

## 2024-07-23 PROCEDURE — 94761 N-INVAS EAR/PLS OXIMETRY MLT: CPT

## 2024-07-23 PROCEDURE — 6360000002 HC RX W HCPCS: Performed by: INTERNAL MEDICINE

## 2024-07-23 PROCEDURE — 85520 HEPARIN ASSAY: CPT

## 2024-07-23 PROCEDURE — 0DJ08ZZ INSPECTION OF UPPER INTESTINAL TRACT, VIA NATURAL OR ARTIFICIAL OPENING ENDOSCOPIC: ICD-10-PCS | Performed by: INTERNAL MEDICINE

## 2024-07-23 PROCEDURE — 3700000000 HC ANESTHESIA ATTENDED CARE: Performed by: INTERNAL MEDICINE

## 2024-07-23 PROCEDURE — 2500000003 HC RX 250 WO HCPCS: Performed by: INTERNAL MEDICINE

## 2024-07-23 PROCEDURE — 3600007513: Performed by: INTERNAL MEDICINE

## 2024-07-23 PROCEDURE — 3600007503: Performed by: INTERNAL MEDICINE

## 2024-07-23 PROCEDURE — 2500000003 HC RX 250 WO HCPCS: Performed by: NURSE ANESTHETIST, CERTIFIED REGISTERED

## 2024-07-23 PROCEDURE — 2580000003 HC RX 258: Performed by: NURSE ANESTHETIST, CERTIFIED REGISTERED

## 2024-07-23 PROCEDURE — 7100000010 HC PHASE II RECOVERY - FIRST 15 MIN: Performed by: INTERNAL MEDICINE

## 2024-07-23 PROCEDURE — 2700000000 HC OXYGEN THERAPY PER DAY

## 2024-07-23 PROCEDURE — 94640 AIRWAY INHALATION TREATMENT: CPT

## 2024-07-23 PROCEDURE — 36415 COLL VENOUS BLD VENIPUNCTURE: CPT

## 2024-07-23 PROCEDURE — 6370000000 HC RX 637 (ALT 250 FOR IP): Performed by: INTERNAL MEDICINE

## 2024-07-23 PROCEDURE — 2709999900 HC NON-CHARGEABLE SUPPLY: Performed by: INTERNAL MEDICINE

## 2024-07-23 PROCEDURE — 1100000000 HC RM PRIVATE

## 2024-07-23 PROCEDURE — 80053 COMPREHEN METABOLIC PANEL: CPT

## 2024-07-23 PROCEDURE — 3700000001 HC ADD 15 MINUTES (ANESTHESIA): Performed by: INTERNAL MEDICINE

## 2024-07-23 PROCEDURE — 85025 COMPLETE CBC W/AUTO DIFF WBC: CPT

## 2024-07-23 PROCEDURE — 99232 SBSQ HOSP IP/OBS MODERATE 35: CPT | Performed by: STUDENT IN AN ORGANIZED HEALTH CARE EDUCATION/TRAINING PROGRAM

## 2024-07-23 PROCEDURE — 7100000011 HC PHASE II RECOVERY - ADDTL 15 MIN: Performed by: INTERNAL MEDICINE

## 2024-07-23 PROCEDURE — 6360000002 HC RX W HCPCS: Performed by: PHYSICIAN ASSISTANT

## 2024-07-23 PROCEDURE — 0DH68UZ INSERTION OF FEEDING DEVICE INTO STOMACH, VIA NATURAL OR ARTIFICIAL OPENING ENDOSCOPIC: ICD-10-PCS | Performed by: INTERNAL MEDICINE

## 2024-07-23 RX ORDER — HEPARIN SODIUM 1000 [USP'U]/ML
40 INJECTION, SOLUTION INTRAVENOUS; SUBCUTANEOUS PRN
Status: DISCONTINUED | OUTPATIENT
Start: 2024-07-23 | End: 2024-07-25

## 2024-07-23 RX ORDER — HEPARIN SODIUM 1000 [USP'U]/ML
80 INJECTION, SOLUTION INTRAVENOUS; SUBCUTANEOUS PRN
Status: DISCONTINUED | OUTPATIENT
Start: 2024-07-23 | End: 2024-07-25

## 2024-07-23 RX ORDER — LIDOCAINE HYDROCHLORIDE 20 MG/ML
INJECTION, SOLUTION EPIDURAL; INFILTRATION; INTRACAUDAL; PERINEURAL PRN
Status: DISCONTINUED | OUTPATIENT
Start: 2024-07-23 | End: 2024-07-23 | Stop reason: SDUPTHER

## 2024-07-23 RX ORDER — HEPARIN SODIUM 1000 [USP'U]/ML
80 INJECTION, SOLUTION INTRAVENOUS; SUBCUTANEOUS ONCE
Status: DISCONTINUED | OUTPATIENT
Start: 2024-07-23 | End: 2024-07-23

## 2024-07-23 RX ORDER — SODIUM CHLORIDE, SODIUM LACTATE, POTASSIUM CHLORIDE, CALCIUM CHLORIDE 600; 310; 30; 20 MG/100ML; MG/100ML; MG/100ML; MG/100ML
INJECTION, SOLUTION INTRAVENOUS CONTINUOUS PRN
Status: DISCONTINUED | OUTPATIENT
Start: 2024-07-23 | End: 2024-07-23 | Stop reason: SDUPTHER

## 2024-07-23 RX ORDER — PROPOFOL 10 MG/ML
INJECTION, EMULSION INTRAVENOUS
Status: COMPLETED
Start: 2024-07-23 | End: 2024-07-23

## 2024-07-23 RX ORDER — HEPARIN SODIUM 1000 [USP'U]/ML
80 INJECTION, SOLUTION INTRAVENOUS; SUBCUTANEOUS PRN
Status: DISCONTINUED | OUTPATIENT
Start: 2024-07-23 | End: 2024-07-23

## 2024-07-23 RX ADMIN — IPRATROPIUM BROMIDE AND ALBUTEROL SULFATE 1 DOSE: 2.5; .5 SOLUTION RESPIRATORY (INHALATION) at 08:10

## 2024-07-23 RX ADMIN — PROPOFOL 20 MG: 10 INJECTION, EMULSION INTRAVENOUS at 14:30

## 2024-07-23 RX ADMIN — PROPOFOL 20 MG: 10 INJECTION, EMULSION INTRAVENOUS at 14:28

## 2024-07-23 RX ADMIN — IPRATROPIUM BROMIDE AND ALBUTEROL SULFATE 1 DOSE: 2.5; .5 SOLUTION RESPIRATORY (INHALATION) at 19:40

## 2024-07-23 RX ADMIN — HEPARIN SODIUM 20 UNITS/KG/HR: 10000 INJECTION, SOLUTION INTRAVENOUS at 21:57

## 2024-07-23 RX ADMIN — LIDOCAINE HYDROCHLORIDE 100 MG: 20 INJECTION, SOLUTION EPIDURAL; INFILTRATION; INTRACAUDAL; PERINEURAL at 14:24

## 2024-07-23 RX ADMIN — POTASSIUM CHLORIDE, DEXTROSE MONOHYDRATE AND SODIUM CHLORIDE: 150; 5; 450 INJECTION, SOLUTION INTRAVENOUS at 11:43

## 2024-07-23 RX ADMIN — Medication 2 PUFF: at 08:10

## 2024-07-23 RX ADMIN — HEPARIN SODIUM 20 UNITS/KG/HR: 10000 INJECTION, SOLUTION INTRAVENOUS at 18:31

## 2024-07-23 RX ADMIN — PROPOFOL 30 MG: 10 INJECTION, EMULSION INTRAVENOUS at 14:26

## 2024-07-23 RX ADMIN — PIPERACILLIN AND TAZOBACTAM 4500 MG: 4; .5 INJECTION, POWDER, LYOPHILIZED, FOR SOLUTION INTRAVENOUS at 23:45

## 2024-07-23 RX ADMIN — PIPERACILLIN AND TAZOBACTAM 4500 MG: 4; .5 INJECTION, POWDER, LYOPHILIZED, FOR SOLUTION INTRAVENOUS at 09:12

## 2024-07-23 RX ADMIN — PIPERACILLIN AND TAZOBACTAM 4500 MG: 4; .5 INJECTION, POWDER, LYOPHILIZED, FOR SOLUTION INTRAVENOUS at 16:37

## 2024-07-23 RX ADMIN — SODIUM CHLORIDE, PRESERVATIVE FREE 10 ML: 5 INJECTION INTRAVENOUS at 09:06

## 2024-07-23 RX ADMIN — PIPERACILLIN AND TAZOBACTAM 4500 MG: 4; .5 INJECTION, POWDER, LYOPHILIZED, FOR SOLUTION INTRAVENOUS at 00:06

## 2024-07-23 RX ADMIN — PROPOFOL 20 MG: 10 INJECTION, EMULSION INTRAVENOUS at 14:32

## 2024-07-23 RX ADMIN — Medication 2 PUFF: at 19:36

## 2024-07-23 RX ADMIN — SODIUM CHLORIDE, POTASSIUM CHLORIDE, SODIUM LACTATE AND CALCIUM CHLORIDE: 600; 310; 30; 20 INJECTION, SOLUTION INTRAVENOUS at 14:19

## 2024-07-23 RX ADMIN — HEPARIN SODIUM 7300 UNITS: 1000 INJECTION INTRAVENOUS; SUBCUTANEOUS at 18:30

## 2024-07-23 RX ADMIN — PROPOFOL 50 MG: 10 INJECTION, EMULSION INTRAVENOUS at 14:24

## 2024-07-23 ASSESSMENT — ENCOUNTER SYMPTOMS
WHEEZING: 0
NAUSEA: 0
VOMITING: 0
BACK PAIN: 0
SHORTNESS OF BREATH: 1
CHOKING: 1
COUGH: 0
TROUBLE SWALLOWING: 1

## 2024-07-23 NOTE — PROGRESS NOTES
Writer notified JOEL Cordon of critical Anti Xa lab of 1.07. Per PA hold heparin infusion until post PEG procedure today.

## 2024-07-23 NOTE — PROGRESS NOTES
Subjective:   April Mayo   69 y.o.   1955     Refered by: No referring provider defined for this encounter.     Consult Note:   Chief Compliant: Dysphonia     History of Present Illness:  April Mayo is a 69 y.o. female with past medical history of arthritis, graves disease s/p ablation, HTN, who was referred from Dr. Darek Moscoso for evaluation of dysphonia.     Patient was found to have a large obstructing laryngeal tumor, s/p awake trach. Treated with definitive CRT, completed on Friday 7/19/24    Interval Hx:   7/20/24:   Patient was having worsening dysphagia for the last couple weeks. Admitted last night for aspiration PNA and PE. Currently on Zosyn and heparin drip.     7/23/24:   MBS and labs reviewed. SLP recommendation aspiration precautions with full liquid diet including thins    Patient's tracheostomy secretions significantly improved from prior.  Requiring less suctioning.  Patient scheduled for PEG placement today.    Review of Systems  Consitutional: denies fever, excessive weight gain or loss.  Eyes: denies diplopia, eye pain.  Integumentary: denies new concerning skin lesions.  Ears, Nose, Mouth, Throat: denies except as per HPI.  Endocrine: denies hot or cold intolerance, increased thirst.  Respiratory: denies cough, hemoptysis, wheezing  Gastrointestinal: denies trouble swallowing, nausea, emesis, regurgitation  Musculoskeletal: denies muscle weakness or wasting  Cardiovascular: denies chest pain, shortness of breath  Neurologic: denies seizures, numbness or tingling, syncope  Hematologic: denies easy bleeding or bruising       Past Medical History:   Diagnosis Date    Arthritis     Graves disease     Hypertension      Past Surgical History:   Procedure Laterality Date    BUNIONECTOMY      HYSTERECTOMY (CERVIX STATUS UNKNOWN)      HYSTERECTOMY, VAGINAL      IR PORT PLACEMENT EQUAL OR GREATER THAN 5 YEARS  5/3/2024    IR PORT PLACEMENT EQUAL OR GREATER THAN 5 YEARS 5/3/2024 Sai  (4/2024)  Findings:   Nasal Cavity: Normal nasal cavity, no polyposis or purulence.  Middle meatus clear bilaterally.   Nasopharynx: No overt masses or lesions.   Base of tongue: Vallecula & epiglottis unremarkable. Clear pyriform sinus.   TVC: Right true cord fixed with significant arytenoid hooding, left vocal cord mobile with some edema and erythema along arytenoid   Subglottis: Unable to be visualized due to laryngeal obstruction  Tracheobronchoscopy: Tracheostomy tube in good position without significant tracheal irritation.  No increased or purulent secretions coming from bronchi        Labs:    Latest Reference Range & Units 07/22/24 08:20   Sodium 136 - 145 mmol/L 145   Potassium 3.5 - 5.1 mmol/L 3.6   Chloride 97 - 108 mmol/L 116 (H)   CARBON DIOXIDE 21 - 32 mmol/L 25   BUN,BUNPL 6 - 20 mg/dL 20   Creatinine 0.55 - 1.02 mg/dL 1.09 (H)   Bun/Cre 12 - 20   18   Anion Gap 5 - 15 mmol/L 4 (L)   Est, Glom Filt Rate >60 ml/min/1.73m2 55 (L)   Glucose 65 - 100 mg/dL 132 (H)   Calcium 8.5 - 10.1 mg/dL 8.4 (L)   WBC 3.6 - 11.0 K/uL 9.9   RBC 3.80 - 5.20 M/uL 2.79 (L)   Hemoglobin Quant 11.5 - 16.0 g/dL 8.3 (L)   Hematocrit 35.0 - 47.0 % 26.3 (L)   MCV 80.0 - 99.0 FL 94.3   MCH 26.0 - 34.0 PG 29.7   MCHC 30.0 - 36.5 g/dL 31.6   MPV 8.9 - 12.9 FL 10.7   RDW 11.5 - 14.5 % 16.6 (H)   Platelet Count 150 - 400 K/uL 241   (H): Data is abnormally high  (L): Data is abnormally low    Radiology: Imaging studies independently review by me - transglottic R TVC mass, no overt cervical LAD. CXR = subq emphysema and pneumomediastinum, no pneumothorax.   MBS =   CT Neck:   There is masslike soft tissue in the right aspect of the larynx resulting in  greater than 50% narrowing of the airway at this level.    CTA C/A/P:   IMPRESSION:  1.  Moderate burden of central pulmonary emboli involving distal main, lobar,  and segmental arteries of all 5 lobes, right greater than left. No evidence of  right heart strain on CT.  2.  Right lower  surgical/procedural intervention if they fail conservative therapy. The risks and benefits of the considered procedures were discussed with the patient. All patient questions were answered.     The patient was instructed to return to clinic if they have no improvement or progression of their symptoms. Signs to watch out for were reviewed with them.      Vaishali Pringle MD   [facility]  [address] [place] [id number]  Office Phone: [phone]

## 2024-07-23 NOTE — PROGRESS NOTES
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   female in consultation     Excerpts from admission 2024 or consult notes as follows:     April Mayo is a 69-year-old female with a history of post ablative hypothyroidism, vocal cord mass, dysphonia, neoplasm of uncertain behavior of the vocal cord, tracheostomy, laryngeal cancer, and aspiration pneumonia, who presented to the emergency department yesterday for difficulty eating, drinking, and throat pain secondary to radiation treatment.  Per the patient's daughter, the patient also experienced right leg swelling, and she reported a odor from her tracheostomy.  The patient's daughter also reports that last week the patient was diagnosed with pneumonia and is experiencing an increase in mucus secretions from the trach as well.        Allergies   Allergen Reactions    Codeine Other (See Comments)     NAUSEA, VOMITING         MAR reviewed and pertinent medications noted or modified as needed     Current Facility-Administered Medications   Medication Dose Route Frequency Provider Last Rate Last Admin    heparin 25,000 units in dextrose 5% 250 mL (premix) infusion  5-30 Units/kg/hr IntraVENous Continuous Chavo Rodriguez MD   Stopped at 24 0723    ipratropium 0.5 mg-albuterol 2.5 mg (DUONEB) nebulizer solution

## 2024-07-23 NOTE — ANESTHESIA PRE PROCEDURE
0812 07/23/24 0853   BP: (!) 141/86 (!) 142/82  (!) 149/83   Pulse: 78 76 72 82   Resp: 19 20 16 18   Temp: 98.2 °F (36.8 °C) 98.1 °F (36.7 °C)  98.1 °F (36.7 °C)   TempSrc: Oral Oral  Oral   SpO2: 99% 100% 99% 98%   Weight:       Height:                                                  BP Readings from Last 3 Encounters:   07/23/24 (!) 149/83   05/03/24 (!) 145/69   04/30/24 127/65       NPO Status:                                                                                 BMI:   Wt Readings from Last 3 Encounters:   07/19/24 90.7 kg (200 lb)   07/08/24 101.7 kg (224 lb 4.8 oz)   07/01/24 105.7 kg (233 lb)     Body mass index is 35.43 kg/m².    CBC:   Lab Results   Component Value Date/Time    WBC 9.9 07/22/2024 08:20 AM    RBC 2.79 07/22/2024 08:20 AM    HGB 8.3 07/22/2024 08:20 AM    HCT 26.3 07/22/2024 08:20 AM    MCV 94.3 07/22/2024 08:20 AM    RDW 16.6 07/22/2024 08:20 AM     07/22/2024 08:20 AM       CMP:   Lab Results   Component Value Date/Time     07/22/2024 08:20 AM    K 3.6 07/22/2024 08:20 AM     07/22/2024 08:20 AM    CO2 25 07/22/2024 08:20 AM    BUN 20 07/22/2024 08:20 AM    CREATININE 1.09 07/22/2024 08:20 AM    LABGLOM 55 07/22/2024 08:20 AM    LABGLOM >60 03/25/2024 07:37 AM    GLUCOSE 132 07/22/2024 08:20 AM    CALCIUM 8.4 07/22/2024 08:20 AM    BILITOT 1.2 07/19/2024 11:51 AM    ALKPHOS 104 07/19/2024 11:51 AM    AST 20 07/19/2024 02:16 PM    ALT 34 07/19/2024 11:51 AM       POC Tests: No results for input(s): \"POCGLU\", \"POCNA\", \"POCK\", \"POCCL\", \"POCBUN\", \"POCHEMO\", \"POCHCT\" in the last 72 hours.    Coags:   Lab Results   Component Value Date/Time    PROTIME 19.5 07/19/2024 06:26 PM    INR 1.6 07/19/2024 06:26 PM    APTT 30.6 07/19/2024 06:26 PM       HCG (If Applicable): No results found for: \"PREGTESTUR\", \"PREGSERUM\", \"HCG\", \"HCGQUANT\"     ABGs: No results found for: \"PHART\", \"PO2ART\", \"XTC5ZPP\", \"XEE5VCE\", \"BEART\", \"Z5LTQOSK\"     Type & Screen (If Applicable):  No

## 2024-07-23 NOTE — PROGRESS NOTES
PT eval order received and acknowledged. PT eval attempted at 1300 however pt currently LUIZ for PEG placement. Will continue to follow patient and attempt PT eval at a later time. Thank you.

## 2024-07-23 NOTE — PROGRESS NOTES
Hospitalist Progress Note    NAME:   April Mayo   : 1955   MRN: [id number]     Date/Time: 2024 10:58 AM  Patient PCP: Lyly Bowman MD    Estimated discharge date: 48 hours  Barriers: PEG placement and feed toleration      Assessment / Plan:    SIRS, resolved  Low-grade fever with mild tachycardia and tachypnea without significant hypoxia.  No significant leukocytosis however left shift on differential is noted  Patient is however significantly immunocompromised with recent chemotherapy and ongoing radiation therapy currently  Likely secondary to right-sided pneumonia as evaluated on chest x-ray  Blood cultures NGTD  Continue Zosyn     Acute hypoxic respiratory failure   - now requiring 10 L high flow via trach secondary to bilateral pulmonary emboli and severe aspiration pneumonia  Chronic trach at home, secondary to laryngeal cancer  Pulmonology following    Aspiration pneumonia  Continue on IV Zosyn  Continue on DuoNebs, Symbicort, Zyrtec, Mucinex  Pulmonology following     Moderate burden bilateral acute pulmonary emboli   No significant right heart strain noted on CT angiogram chest.  Echocardiogram without any significant right heart strain.  Switch from heparin GTT to Eliquis p.o. after PEG placement.    Severe acute right-sided DVT  Bilateral common femoral and external iliac vein DVTs on CT abdomen  Transition to Eliquis following PEG placement     Severe dysphagia  Appreciate ENT and GI consultation.  PEG tube to be placed today per GI  Initiate tube feeds, will need to tolerate for 24 hours    Mild CHRISTEN  Creatinine mildly elevated to 1.09, repeat pending, baseline 0.76  Cont' gentle IV fluids     Laryngeal cancer  On chemotherapy and radiation therapy per [facility]  Continue outpatient follow-up with oncology    Hypertension  Hold patient's Cozaar given mild CHRISTEN  Currently normotensive    1.5 cm metallic foreign body in cecum  Seen on CT abdomen  Could be PillCam or other ingested  She does not feel short of breath as before.  We discussed PEG tube today, need for toleration of feeds for 24 hours.    Objective:     VITALS:   Last 24hrs VS reviewed since prior progress note. Most recent are:  Patient Vitals for the past 24 hrs:   BP Temp Temp src Pulse Resp SpO2   07/23/24 0853 (!) 149/83 98.1 °F (36.7 °C) Oral 82 18 98 %   07/23/24 0812 -- -- -- 72 16 99 %   07/23/24 0434 (!) 142/82 98.1 °F (36.7 °C) Oral 76 20 100 %   07/22/24 2356 (!) 141/86 98.2 °F (36.8 °C) Oral 78 19 99 %   07/22/24 2001 (!) 141/69 97.3 °F (36.3 °C) Oral 76 17 96 %   07/22/24 1934 -- -- -- 84 20 95 %   07/22/24 1327 (!) 154/72 98.2 °F (36.8 °C) Oral 75 -- --           Intake/Output Summary (Last 24 hours) at 7/23/2024 1058  Last data filed at 7/23/2024 0906  Gross per 24 hour   Intake 110 ml   Output 403 ml   Net -293 ml          I had a face to face encounter and independently examined this patient on 7/23/2024, as outlined below:  Review of Systems   Constitutional:  Negative for fatigue and fever.   HENT:  Positive for trouble swallowing.    Respiratory:  Positive for choking and shortness of breath. Negative for cough and wheezing.    Cardiovascular:  Negative for chest pain and palpitations.   Gastrointestinal:  Negative for nausea and vomiting.   Musculoskeletal:  Negative for back pain and myalgias.   Skin:  Negative for rash and wound.   Neurological:  Negative for dizziness and light-headedness.   Psychiatric/Behavioral:  Negative for confusion and dysphoric mood.         Physical Exam  Constitutional:       General: She is not in acute distress.     Appearance: Normal appearance. She is not ill-appearing.   Neck:      Comments: Tracheostomy in place  Cardiovascular:      Rate and Rhythm: Normal rate and regular rhythm.      Pulses: Normal pulses.   Pulmonary:      Effort: Pulmonary effort is normal. No respiratory distress.      Breath sounds: Normal breath sounds. No wheezing.      Comments: Stable on 10 L via

## 2024-07-23 NOTE — PROGRESS NOTES
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   female in consultation     Excerpts from admission 2024 or consult notes as follows:     April Mayo is a 69-year-old female with a history of post ablative hypothyroidism, vocal cord mass, dysphonia, neoplasm of uncertain behavior of the vocal cord, tracheostomy, laryngeal cancer, and aspiration pneumonia, who presented to the emergency department yesterday for difficulty eating, drinking, and throat pain secondary to radiation treatment.  Per the patient's daughter, the patient also experienced right leg swelling, and she reported a odor from her tracheostomy.  The patient's daughter also reports that last week the patient was diagnosed with pneumonia and is experiencing an increase in mucus secretions from the trach as well.        Allergies   Allergen Reactions    Codeine Other (See Comments)     NAUSEA, VOMITING         MAR reviewed and pertinent medications noted or modified as needed     Current Facility-Administered Medications   Medication Dose Route Frequency Provider Last Rate Last Admin    heparin 25,000 units in dextrose 5% 250 mL (premix) infusion  5-30 Units/kg/hr IntraVENous Continuous Chavo Rodriguez MD   Stopped at 24 0723    ipratropium 0.5 mg-albuterol 2.5 mg (DUONEB) nebulizer solution  IntraVENous Once Chavo Rodriguez MD          Patient PCP: Lyly Bowman MD  PMH:  has a past medical history of Arthritis, Graves disease, and Hypertension.  PSH:   has a past surgical history that includes Hysterectomy; Bunionectomy; other surgical history; Hysterectomy, vaginal; tracheostomy (N/A, 3/14/2024); and IR PORT PLACEMENT > 5 YEARS (5/3/2024).   FHX: family history includes Heart Failure in her mother; Rheum Arthritis in her sister; Stroke in her sister; Thyroid Disease in her sister.   SHX:  reports that she quit smoking about 10 months ago. Her smoking use included cigarettes. She has never used smokeless tobacco. She reports that she does not drink alcohol and does not use drugs.     ROS:    For ROS refer to HPI.     Objective:     Vital Signs: Telemetry:    normal sinus rhythm Intake/Output:   BP (!) 142/82   Pulse 76   Temp 98.1 °F (36.7 °C) (Oral)   Resp 20   Ht 1.6 m (5' 3\")   Wt 90.7 kg (200 lb)   SpO2 100%   BMI 35.43 kg/m²     Temp (24hrs), Av °F (36.7 °C), Min:97.3 °F (36.3 °C), Max:98.2 °F (36.8 °C)          O2 Device: None (Room air) O2 Flow Rate (L/min): 10 L/min     Wt Readings from Last 4 Encounters:   24 90.7 kg (200 lb)   24 101.7 kg (224 lb 4.8 oz)   24 105.7 kg (233 lb)   24 108.9 kg (240 lb 1.6 oz)          Intake/Output Summary (Last 24 hours) at 2024 0802  Last data filed at 2024 0434  Gross per 24 hour   Intake 100 ml   Output 403 ml   Net -303 ml         Last shift:      No intake/output data recorded.  Last 3 shifts:  1901 -  0700  In: 300   Out: 953 [Urine:950]       Physical Exam:     Physical Exam  Constitutional:       General: She is not in acute distress.     Appearance: She is not ill-appearing.   Cardiovascular:      Rate and Rhythm: Normal rate.   Pulmonary:      Effort: No respiratory distress.      Breath sounds: No stridor. No wheezing, rhonchi or rales.      Comments: The patient has a tracheostomy  Chest:  vein.   Acute superficial vein thrombosis in the right small saphenous vein.     XR CHEST PORTABLE    Result Date: 7/19/2024  EXAM: XR CHEST PORTABLE INDICATION:  R/o Continuing Pneumonia COMPARISON: 7/12/24 TECHNIQUE: Portable chest view FINDINGS:  Tracheostomy and right IJ portacath unchanged.  The cardiac size is within normal limits. The pulmonary vasculature is within normal limits. Increased airspace disease is present in the right base.  No pleural effusion or pneumothorax.  The visualized bones and upper abdomen are age-appropriate.     Increased airspace disease is present in the right base.  Electronically signed by ARMANDO TONY       ARTERIAL BLOOD GAS  No results for input(s): \"PHART\", \"RHL1CKX\", \"PO2ART\", \"VEA4WIQ\", \"BEART\", \"KCR7WMQT\", \"HGBART\", \"PA0BEWSHE\", \"FIO2A\", \"U2KYPHHL\", \"OXYHEM\", \"CARBOXHGBART\", \"METHGBART\", \"Z5TALYKOJ\", \"PHCORART\", \"TEMP\" in the last 72 hours.    Invalid input(s): \"MTB9IYXGF\"  No results found for this or any previous visit.    IMPRESSION:   Acute chronic respiratory failure with Hypoxia   Acute pulmonary emboli  Acute right-sided DVT   Aspiration pneumonia   Mild CHRISTEN   Hypertension stable  Dysphagia secondary to radiation treatment  Body mass index is 35.43 kg/m².  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  The patient's

## 2024-07-23 NOTE — PLAN OF CARE
Problem: Discharge Planning  Goal: Discharge to home or other facility with appropriate resources  7/22/2024 2235 by Alexandru Mason LPN  Outcome: Progressing  7/22/2024 1818 by Yvette Serrano RN  Outcome: Progressing     Problem: Pain  Goal: Verbalizes/displays adequate comfort level or baseline comfort level  7/22/2024 2235 by Alexandru Mason LPN  Outcome: Progressing  7/22/2024 1818 by Yvette Serrano RN  Outcome: Progressing     Problem: Skin/Tissue Integrity  Goal: Absence of new skin breakdown  Description: 1.  Monitor for areas of redness and/or skin breakdown  2.  Assess vascular access sites hourly  3.  Every 4-6 hours minimum:  Change oxygen saturation probe site  4.  Every 4-6 hours:  If on nasal continuous positive airway pressure, respiratory therapy assess nares and determine need for appliance change or resting period.  7/22/2024 2235 by Alexandru Mason LPN  Outcome: Progressing  7/22/2024 1818 by Yvette Serrano RN  Outcome: Progressing     Problem: ABCDS Injury Assessment  Goal: Absence of physical injury  7/22/2024 2235 by Alexandru Mason LPN  Outcome: Progressing  7/22/2024 1818 by Yvette Serrano RN  Outcome: Progressing     Problem: Safety - Adult  Goal: Free from fall injury  7/22/2024 2235 by Alexandru Mason LPN  Outcome: Progressing  7/22/2024 1818 by Yvette Serrano RN  Outcome: Progressing     Problem: SLP Adult - Impaired Swallowing  Goal: By Discharge: Advance to least restrictive diet without signs or symptoms of aspiration for planned discharge setting.  See evaluation for individualized goals.  Description: Speech Therapy Goals  Initiated 7/20/2024  -Patient stated goal: \"I don't want to cough when eating/drinking\"  -Patient will participate in modified barium swallow study within 7 day(s).         [ ] Not met  [ ]  MET   [ ] Progressing  [ ] Discontinue  -Patient will demonstrate understanding of swallow safety precautions and aspiration precautions,  diet recs within 7 day(s).        [ ] Not met  [ ]  MET   [ ] Progressing  [ ] Discontinue   7/22/2024 1105 by Niharika Stuart, SLP  Outcome: Progressing

## 2024-07-23 NOTE — PROGRESS NOTES
Hematology/Oncology   Progress Note    Patient: April Mayo MRN: [id number]     YOB: 1955  Age: 69 y.o.  Sex: female      Admit Date: 7/19/2024    LOS: 4 days     Chief Complaint: Dysphagia    Subjective:   Reports dysphagia. Unable to eat solids or liquids. To get Peg tube palced today.    Constitutional No fevers, chills, night sweats   Hematologic/Lymphatic No easy bruising or bleeding.     Respiratory No dyspnea on exertion, cough or hemoptysis.   Cardiovascular No anginal chest pain, tachycardia, palpitations.   Gastrointestinal No nausea, vomiting, diarrhea, constipation, GI bleeding   Genitourinary (M) No hematuria, dysuria   Musculoskeletal No joint pain, swelling or redness.    Neurologic No headache, blurred vision     Current Facility-Administered Medications:     heparin 25,000 units in dextrose 5% 250 mL (premix) infusion, 5-30 Units/kg/hr, IntraVENous, Continuous, Chavo Rodriguez MD, Stopped at 07/23/24 0723    ipratropium 0.5 mg-albuterol 2.5 mg (DUONEB) nebulizer solution 1 Dose, 1 Dose, Inhalation, Q6H WA RT, Darek Moscoso MD, 1 Dose at 07/23/24 0810    sucralfate (CARAFATE) tablet 1 g, 1 g, Oral, 3 times per day, Geronimo Villeda MD    nystatin (MYCOSTATIN) 552966 UNIT/ML suspension 500,000 Units, 5 mL, Oral, 4x Daily, Collins Chacon MD, 500,000 Units at 07/22/24 1328    dextrose 5 % and 0.45 % NaCl with KCl 20 mEq infusion, , IntraVENous, Continuous, Chavo Rodriguez MD, Last Rate: 100 mL/hr at 07/22/24 1848, New Bag at 07/22/24 1848    sodium chloride flush 0.9 % injection 5-40 mL, 5-40 mL, IntraVENous, 2 times per day, Chavo Rodriguez MD, 10 mL at 07/23/24 0906    sodium chloride flush 0.9 % injection 5-40 mL, 5-40 mL, IntraVENous, PRN, Chavo Rodriguez MD    0.9 % sodium chloride infusion, , IntraVENous, PRN, Chavo Rodriguez MD    ondansetron (ZOFRAN-ODT) disintegrating tablet 4 mg, 4 mg, Oral, Q8H PRN **OR** ondansetron (ZOFRAN) injection 4 mg, 4 mg, IntraVENous,  masses, ascites or hepatosplenomegaly. Good bowel sounds. No guarding or rebound tenderness.    Musculoskeletal No tenderness or swelling   Extremities No visible deformities or edema.    Skin No rashes or lesions suggestive of malignancy. No petechiae, purpura.   Neurologic Alert and oriented times three. Coherent speech. Verbalizes understanding of our discussions today.     Lab/Data Review:  Recent Labs     07/21/24  0206 07/22/24  0820   WBC 8.9 9.9   HGB 8.5* 8.3*   HCT 26.8* 26.3*    241       Recent Labs     07/21/24  0206 07/22/24  0820    145   K 3.6 3.6   * 116*   CO2 23 25   BUN 27* 20       No results for input(s): \"PH\", \"PCO2\", \"PO2\", \"HCO3\", \"FIO2\" in the last 72 hours.  Recent Results (from the past 24 hour(s))   Heparin, Anti-XA    Collection Time: 07/22/24 12:28 PM   Result Value Ref Range    Heparin Xa,LMWH and Unfrac <0.10 IU/mL   Heparin, Anti-XA    Collection Time: 07/22/24  3:12 PM   Result Value Ref Range    Heparin Xa,LMWH and Unfrac <0.10 IU/mL   Heparin, Anti-XA    Collection Time: 07/22/24  9:28 PM   Result Value Ref Range    Heparin Xa,LMWH and Unfrac 0.85 IU/mL   Heparin, Anti-XA    Collection Time: 07/23/24  6:17 AM   Result Value Ref Range    Heparin Xa,LMWH and Unfrac 1.07 (HH) IU/mL      Assessment and Plan:     Laryngeal cancer:  -Completed chemo RT last week  -Has developed progressive dysphagia secondary to radiation esophagitis and pharyngitis    Dysphagia:  -Secondary to radiation esophagitis and pharyngitis  -Decreased p.o. intake, weight loss and unable to tolerate solids or liquids  -She is a good candidate for PEG tube and GI is following for the same, to receive today    Pulmonary embolism and DVT:  -On IV heparin  -Switch to Eliquis at the time of discharge    Anemia:  -Hemoglobin is low but stable at 8.3 yesterday, repeat CBC ordered for tomorrow  -Most likely secondary to Chemotherapy as well as nutritional  -Monitor for now    Signed By: Moni WHITTAKER

## 2024-07-23 NOTE — ANESTHESIA POSTPROCEDURE EVALUATION
Department of Anesthesiology  Postprocedure Note    Patient: April Mayo  MRN: [id number]  YOB: 1955  Date of evaluation: 7/23/2024    Procedure Summary       Date: 07/23/24 Room / Location: [facility] 04 / [facility] ENDOSCOPY    Anesthesia Start: 1419 Anesthesia Stop: 1437    Procedure: ESOPHAGOGASTRODUODENOSCOPY PERCUTANEOUS ENDOSCOPIC GASTROSTOMY TUBE INSERTION (Upper GI Region) Diagnosis:       Dysphagia, unspecified type      (Dysphagia, unspecified type [R13.10])    Surgeons: Geronimo Villeda MD Responsible Provider: Waldemar Barbour MD    Anesthesia Type: MAC, TIVA ASA Status: 4            Anesthesia Type: MAC, TIVA    Ena Phase I:      Ena Phase II:      Anesthesia Post Evaluation    Patient location during evaluation: bedside (Endoscopy Unit)  Patient participation: complete - patient participated  Level of consciousness: sleepy but conscious  Pain score: 0  Airway patency: patent  Nausea & Vomiting: no nausea and no vomiting  Cardiovascular status: hemodynamically stable  Respiratory status: acceptable  Hydration status: stable  Comments: This patient remained on the stretcher.  The patient was handed off to the endoscopy nursing team.  All questions regarding pre-, intra-, and postoperative care were answered.  Multimodal analgesia pain management approach    No notable events documented.

## 2024-07-23 NOTE — CARE COORDINATION
CM met w/ pt and daughter at bedside to discuss dc planning. Pt has hx at [facility] recently and was dc'd w/ [facility] dc'd [facility] last month. [facility] has accepted [facility], however, dispo pend therapy recs.

## 2024-07-24 LAB
ALBUMIN SERPL-MCNC: 1.9 G/DL (ref 3.5–5)
ALBUMIN/GLOB SERPL: 0.4 (ref 1.1–2.2)
ALP SERPL-CCNC: 71 U/L (ref 45–117)
ALT SERPL-CCNC: 16 U/L (ref 12–78)
ANION GAP SERPL CALC-SCNC: 5 MMOL/L (ref 5–15)
AST SERPL W P-5'-P-CCNC: 18 U/L (ref 15–37)
BASOPHILS # BLD: 0 K/UL (ref 0–0.1)
BASOPHILS NFR BLD: 0 % (ref 0–1)
BILIRUB SERPL-MCNC: 0.6 MG/DL (ref 0.2–1)
BUN SERPL-MCNC: 11 MG/DL (ref 6–20)
BUN/CREAT SERPL: 12 (ref 12–20)
CA-I BLD-MCNC: 7.9 MG/DL (ref 8.5–10.1)
CHLORIDE SERPL-SCNC: 116 MMOL/L (ref 97–108)
CO2 SERPL-SCNC: 24 MMOL/L (ref 21–32)
CREAT SERPL-MCNC: 0.94 MG/DL (ref 0.55–1.02)
DIFFERENTIAL METHOD BLD: ABNORMAL
EOSINOPHIL # BLD: 0 K/UL (ref 0–0.4)
EOSINOPHIL NFR BLD: 0 % (ref 0–7)
ERYTHROCYTE [DISTWIDTH] IN BLOOD BY AUTOMATED COUNT: 16.4 % (ref 11.5–14.5)
GLOBULIN SER CALC-MCNC: 4.5 G/DL (ref 2–4)
GLUCOSE BLD STRIP.AUTO-MCNC: 108 MG/DL (ref 65–100)
GLUCOSE SERPL-MCNC: 110 MG/DL (ref 65–100)
HCT VFR BLD AUTO: 26.8 % (ref 35–47)
HGB BLD-MCNC: 8.6 G/DL (ref 11.5–16)
IMM GRANULOCYTES # BLD AUTO: 0 K/UL
IMM GRANULOCYTES NFR BLD AUTO: 0 %
LYMPHOCYTES # BLD: 0.6 K/UL (ref 0.8–3.5)
LYMPHOCYTES NFR BLD: 6 % (ref 12–49)
MCH RBC QN AUTO: 30.3 PG (ref 26–34)
MCHC RBC AUTO-ENTMCNC: 32.1 G/DL (ref 30–36.5)
MCV RBC AUTO: 94.4 FL (ref 80–99)
METAMYELOCYTES NFR BLD MANUAL: 3 %
MONOCYTES # BLD: 0.7 K/UL (ref 0–1)
MONOCYTES NFR BLD: 7 % (ref 5–13)
NEUTS BAND NFR BLD MANUAL: 8 % (ref 0–6)
NEUTS SEG # BLD: 8.9 K/UL (ref 1.8–8)
NEUTS SEG NFR BLD: 76 % (ref 32–75)
NRBC # BLD: 0.2 K/UL (ref 0–0.01)
NRBC BLD-RTO: 1.9 PER 100 WBC
PERFORMED BY:: ABNORMAL
PLATELET # BLD AUTO: 259 K/UL (ref 150–400)
PMV BLD AUTO: 10.2 FL (ref 8.9–12.9)
POTASSIUM SERPL-SCNC: 3.3 MMOL/L (ref 3.5–5.1)
PROT SERPL-MCNC: 6.4 G/DL (ref 6.4–8.2)
RBC # BLD AUTO: 2.84 M/UL (ref 3.8–5.2)
RBC MORPH BLD: ABNORMAL
SODIUM SERPL-SCNC: 145 MMOL/L (ref 136–145)
UFH PPP CHRO-ACNC: 0.78 IU/ML
UFH PPP CHRO-ACNC: 0.98 IU/ML
UFH PPP CHRO-ACNC: >1.1 IU/ML
WBC # BLD AUTO: 10.6 K/UL (ref 3.6–11)

## 2024-07-24 PROCEDURE — 82962 GLUCOSE BLOOD TEST: CPT

## 2024-07-24 PROCEDURE — 97530 THERAPEUTIC ACTIVITIES: CPT

## 2024-07-24 PROCEDURE — 2580000003 HC RX 258: Performed by: INTERNAL MEDICINE

## 2024-07-24 PROCEDURE — 97165 OT EVAL LOW COMPLEX 30 MIN: CPT

## 2024-07-24 PROCEDURE — 80053 COMPREHEN METABOLIC PANEL: CPT

## 2024-07-24 PROCEDURE — 94640 AIRWAY INHALATION TREATMENT: CPT

## 2024-07-24 PROCEDURE — 6370000000 HC RX 637 (ALT 250 FOR IP): Performed by: INTERNAL MEDICINE

## 2024-07-24 PROCEDURE — 6360000002 HC RX W HCPCS: Performed by: INTERNAL MEDICINE

## 2024-07-24 PROCEDURE — 85520 HEPARIN ASSAY: CPT

## 2024-07-24 PROCEDURE — 94761 N-INVAS EAR/PLS OXIMETRY MLT: CPT

## 2024-07-24 PROCEDURE — 85025 COMPLETE CBC W/AUTO DIFF WBC: CPT

## 2024-07-24 PROCEDURE — 92526 ORAL FUNCTION THERAPY: CPT

## 2024-07-24 PROCEDURE — 36415 COLL VENOUS BLD VENIPUNCTURE: CPT

## 2024-07-24 PROCEDURE — 1100000000 HC RM PRIVATE

## 2024-07-24 PROCEDURE — 6370000000 HC RX 637 (ALT 250 FOR IP): Performed by: EMERGENCY MEDICINE

## 2024-07-24 PROCEDURE — 97161 PT EVAL LOW COMPLEX 20 MIN: CPT

## 2024-07-24 PROCEDURE — 6370000000 HC RX 637 (ALT 250 FOR IP): Performed by: PHYSICIAN ASSISTANT

## 2024-07-24 RX ORDER — LOSARTAN POTASSIUM 50 MG/1
25 TABLET ORAL DAILY
Status: DISCONTINUED | OUTPATIENT
Start: 2024-07-24 | End: 2024-07-26 | Stop reason: HOSPADM

## 2024-07-24 RX ORDER — GUAIFENESIN 200 MG/10ML
200 LIQUID ORAL EVERY 6 HOURS
Status: DISCONTINUED | OUTPATIENT
Start: 2024-07-24 | End: 2024-07-24

## 2024-07-24 RX ORDER — LISINOPRIL 5 MG/1
2.5 TABLET ORAL DAILY
Status: DISCONTINUED | OUTPATIENT
Start: 2024-07-24 | End: 2024-07-24

## 2024-07-24 RX ORDER — POTASSIUM CHLORIDE 20 MEQ/1
40 TABLET, EXTENDED RELEASE ORAL ONCE
Status: DISCONTINUED | OUTPATIENT
Start: 2024-07-24 | End: 2024-07-24

## 2024-07-24 RX ORDER — DEXTROMETHORPHAN POLISTIREX 30 MG/5ML
60 SUSPENSION ORAL EVERY 12 HOURS SCHEDULED
Status: DISCONTINUED | OUTPATIENT
Start: 2024-07-24 | End: 2024-07-26 | Stop reason: HOSPADM

## 2024-07-24 RX ADMIN — LOSARTAN POTASSIUM 25 MG: 50 TABLET, FILM COATED ORAL at 12:15

## 2024-07-24 RX ADMIN — Medication 2 PUFF: at 19:41

## 2024-07-24 RX ADMIN — POTASSIUM BICARBONATE 40 MEQ: 782 TABLET, EFFERVESCENT ORAL at 12:15

## 2024-07-24 RX ADMIN — PIPERACILLIN AND TAZOBACTAM 4500 MG: 4; .5 INJECTION, POWDER, LYOPHILIZED, FOR SOLUTION INTRAVENOUS at 09:45

## 2024-07-24 RX ADMIN — CETIRIZINE HYDROCHLORIDE 10 MG: 10 TABLET, FILM COATED ORAL at 09:53

## 2024-07-24 RX ADMIN — IPRATROPIUM BROMIDE AND ALBUTEROL SULFATE 1 DOSE: 2.5; .5 SOLUTION RESPIRATORY (INHALATION) at 14:24

## 2024-07-24 RX ADMIN — IPRATROPIUM BROMIDE AND ALBUTEROL SULFATE 1 DOSE: 2.5; .5 SOLUTION RESPIRATORY (INHALATION) at 19:41

## 2024-07-24 RX ADMIN — SODIUM CHLORIDE, PRESERVATIVE FREE 10 ML: 5 INJECTION INTRAVENOUS at 22:25

## 2024-07-24 RX ADMIN — IPRATROPIUM BROMIDE AND ALBUTEROL SULFATE 1 DOSE: 2.5; .5 SOLUTION RESPIRATORY (INHALATION) at 09:18

## 2024-07-24 RX ADMIN — Medication 2 PUFF: at 09:18

## 2024-07-24 RX ADMIN — Medication 60 MG: at 22:25

## 2024-07-24 RX ADMIN — SODIUM CHLORIDE, PRESERVATIVE FREE 10 ML: 5 INJECTION INTRAVENOUS at 09:45

## 2024-07-24 RX ADMIN — LISINOPRIL 2.5 MG: 5 TABLET ORAL at 09:53

## 2024-07-24 RX ADMIN — HEPARIN SODIUM 15 UNITS/KG/HR: 10000 INJECTION, SOLUTION INTRAVENOUS at 20:11

## 2024-07-24 RX ADMIN — NYSTATIN 500000 UNITS: 100000 SUSPENSION ORAL at 12:12

## 2024-07-24 RX ADMIN — PIPERACILLIN AND TAZOBACTAM 4500 MG: 4; .5 INJECTION, POWDER, LYOPHILIZED, FOR SOLUTION INTRAVENOUS at 22:25

## 2024-07-24 RX ADMIN — PIPERACILLIN AND TAZOBACTAM 4500 MG: 4; .5 INJECTION, POWDER, LYOPHILIZED, FOR SOLUTION INTRAVENOUS at 17:34

## 2024-07-24 ASSESSMENT — ENCOUNTER SYMPTOMS
CHOKING: 1
COUGH: 0
BACK PAIN: 0
SHORTNESS OF BREATH: 1
WHEEZING: 0
NAUSEA: 0
TROUBLE SWALLOWING: 1
VOMITING: 0

## 2024-07-24 NOTE — PLAN OF CARE
Speech LAnguage Pathology Dysphagia TREATMENT    Patient: April Mayo (69 y.o. female)  Date: 7/24/2024  Primary Diagnosis: Aspiration pneumonia of right lower lobe due to vomit (HCC) [J69.0]  Procedure(s) (LRB):  ESOPHAGOGASTRODUODENOSCOPY PERCUTANEOUS ENDOSCOPIC GASTROSTOMY TUBE INSERTION (N/A) 1 Day Post-Op   Precautions: aspiration, nutritional decline                    Time In: 1105  Time Out: 1122    DIET RECOMMENDATIONS: Full liquid and thin liquids    SWALLOW SAFETY PRECAUTIONS: 1:1 assistance with ALL PO intake, STRICT aspiration and GERD precautions, monitor pt closely for s/s aspiration, meds crushed if able in applesauce or pudding, FEED ONLY IF AWAKE AND ALERT.      ASSESSMENT :  Based on the objective data described below, the patient presents with mild oral dysphagia c/b slow oral transit and minimal swallow delay. Patient continues to be cautious w/ oral intake due to fear and odynophagia s/p radiation. Patient is also s/p PEG placement one day.   MBS results reviewed with patient. Patient w/ no penetration or aspiration w/ all oral intake. Pharyngeal phase largely WFL. Some oral dysphagia and possible esophageal dysfunction. Patient was recommended a full liquid diet, thin liquids.     Patient on 10L O2 via T-piece. Trach loose and pulling out of neck. Repositioned and tightened neck straps. Administered thin liquids and applesauce to trial. Patient continues w/ pain when swallowing 8/10. No clinical indicators of penetration or aspiration observed w/ x6 swallows of thin liquids and x4 swallows of applesauce.   Recommend to resume oral diet of full, thin liquids. Will advance diet as tolerated.     Patient will benefit from skilled intervention to address the above impairments.    GOALS:    Problem: SLP Adult - Impaired Swallowing  Goal: By Discharge: Advance to least restrictive diet without signs or symptoms of aspiration for planned discharge setting.  See evaluation for individualized  Independent    Cognitive and Communication Status:  Neurologic State: Alert  Orientation Level: Oriented to person  Cognition: Follows commands      After Treatment:  Patient left in no apparent distress in bed, Call bell left within reach, and Nursing notified     Pain:  VAS (numerical) 0    COMMUNICATION/EDUCATION:   Swallow safety precautions, Aspiration precautions, Diet recommendations, and Compensatory strategies education provided to Patient via explanation, all questions/concerns addressed. Patient verbalizes understanding.    The patient's plan of care including recommendations, planned interventions, and recommended diet changes were discussed with: Registered nurse.    Patient/family have participated as able in goal setting and plan of care    Thank you,  Niharika Stuart, SLP  Minutes: 17

## 2024-07-24 NOTE — CONSULTS
Comprehensive Nutrition Assessment    Type and Reason for Visit:  Consult (Interim)    Nutrition Recommendations/Plan:   Rec initiate TF of Jevity 1.5 (Standard with Fiber) via PEG at 32 mL/hr continuously, advancing 10 mL/hr every 4 hrs as tolerated to goal rate of 72 mL/hr.  Flush 210 mL H2O q4hrs.  Provides: 2592 kcal (99%), 110 g protein (92%), 2575 mL H2O (99%)  Monitor TF tolerance/rate, s/s and BM in I/O's.      Malnutrition Assessment:  Malnutrition Status:  Moderate malnutrition (07/22/24 1427)    Context:  Chronic Illness     Findings of the 6 clinical characteristics of malnutrition:  Energy Intake:  75% or less estimated energy requirements for 1 month or longer  Weight Loss:  7.5% over 3 months     Body Fat Loss:  Unable to assess     Muscle Mass Loss:  Unable to assess    Fluid Accumulation:  Unable to assess     Strength:  Not Performed    Nutrition Assessment:    RD assessed for MST 4. Spoke w pt and family who reports a decreased appetite and intake for the past 3 weeks. The past 2 weeks have mostly only consumed ice chips. Seen by speech and diet has been advance from NPO ti full liquids. Plan initiate ONS BackType 3x/day to address decreased intake, pt agreeable. Meds reviewed. Abnormal labs include , Creatinine 1.09, GFR 55, Glucose 132, Calcium 8.4, Hgb 8.4, HCt 26.3.     7/24/24: RD consulted for TF recs. See TF recs above. PLan to initiate TF Jevity 1.5 via PEG and continue to monitor. MEd reviewed. Abnormal labs include K 3.3 Ch 116, Gluose 110, Calcium 7.9, Albumin 1.9, Hgb 8.6, Hct 26.8.    Nutrition Related Findings:    NFPE defereed, observed as nourished. No N/V/C/D reported.SLP tx ongoing for trach and PO diet, current full liquid diet. LBM 7/22. Wound Type: Surgical Incision       Current Nutrition Intake & Therapies:    Average Meal Intake: NPO  Average Supplements Intake: NPO  Diet NPO Exceptions are: Sips of Water with Meds    Anthropometric Measures:  Height: 160 cm (5'

## 2024-07-24 NOTE — PROGRESS NOTES
Gastroenterology Progress Note        Patient: April Mayo MRN: [id number]  SSN: [id number]    YOB: 1955  Age: 69 y.o.  Sex: female      Admit Date: 7/19/2024    LOS: 5 days     Subjective:   Patient not feels well,   S/p peg placement  yesterday  Current Facility-Administered Medications:     potassium chloride (KLOR-CON M) extended release tablet 40 mEq, 40 mEq, Oral, Once, Ermias Cordon PA-C    losartan (COZAAR) tablet 25 mg, 25 mg, Oral, Daily, Ermias Cordon PA-C    heparin (porcine) injection 3,600 Units, 40 Units/kg, IntraVENous, PRN, Ermias Cordon PA-C    heparin (porcine) injection 7,300 Units, 80 Units/kg, IntraVENous, PRN, Ermias Cordon PA-C, 7,300 Units at 07/23/24 1830    heparin 25,000 units in dextrose 5% 250 mL (premix) infusion, 5-30 Units/kg/hr, IntraVENous, Continuous, Chavo Rodriguez MD, Last Rate: 15.4 mL/hr at 07/24/24 0249, 17 Units/kg/hr at 07/24/24 0249    ipratropium 0.5 mg-albuterol 2.5 mg (DUONEB) nebulizer solution 1 Dose, 1 Dose, Inhalation, Q6H WA RT, Darek Moscoso MD, 1 Dose at 07/24/24 0918    sucralfate (CARAFATE) tablet 1 g, 1 g, Oral, 3 times per day, Geronimo Villeda MD    nystatin (MYCOSTATIN) [id number] UNIT/ML suspension 500,000 Units, 5 mL, Oral, 4x Daily, Collins Chacon MD, 500,000 Units at 07/22/24 1328    dextrose 5 % and 0.45 % NaCl with KCl 20 mEq infusion, , IntraVENous, Continuous, Chavo Rodriguez MD, Paused at 07/23/24 1254    sodium chloride flush 0.9 % injection 5-40 mL, 5-40 mL, IntraVENous, 2 times per day, Chavo Rodriguez MD, 10 mL at 07/23/24 0906    sodium chloride flush 0.9 % injection 5-40 mL, 5-40 mL, IntraVENous, PRN, Chavo Rodriguez MD    0.9 % sodium chloride infusion, , IntraVENous, PRN, Chavo Rodriguez MD    ondansetron (ZOFRAN-ODT) disintegrating tablet 4 mg, 4 mg, Oral, Q8H PRN **OR** ondansetron (ZOFRAN) injection 4 mg, 4 mg, IntraVENous, Q6H PRN, Chavo Rodriguez MD    polyethylene glycol

## 2024-07-24 NOTE — PROGRESS NOTES
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   female in consultation     Excerpts from admission 2024 or consult notes as follows:     April Mayo is a 69-year-old female with a history of post ablative hypothyroidism, vocal cord mass, dysphonia, neoplasm of uncertain behavior of the vocal cord, tracheostomy, laryngeal cancer, and aspiration pneumonia, who presented to the emergency department yesterday for difficulty eating, drinking, and throat pain secondary to radiation treatment.  Per the patient's daughter, the patient also experienced right leg swelling, and she reported a odor from her tracheostomy.  The patient's daughter also reports that last week the patient was diagnosed with pneumonia and is experiencing an increase in mucus secretions from the trach as well.        Allergies   Allergen Reactions    Codeine Other (See Comments)     NAUSEA, VOMITING         MAR reviewed and pertinent medications noted or modified as needed     Current Facility-Administered Medications   Medication Dose Route Frequency Provider Last Rate Last Admin    potassium chloride (KLOR-CON M) extended release tablet 40 mEq  40 mEq Oral Once Ermias Cordon PA-C        lisinopril (PRINIVIL;ZESTRIL) tablet 2.5 mg  2.5 mg Oral Daily Ermias Cordon PA-C         0358    Benzocaine-Menthol (CEPACOL) 1 lozenge  1 lozenge Oral Q2H PRN Chavo Rodriguez MD        budesonide-formoterol (SYMBICORT) 160-4.5 MCG/ACT inhaler 2 puff  2 puff Inhalation BID RT Chavo Rodriguez MD   2 puff at 24 193    cetirizine (ZYRTEC) tablet 10 mg  10 mg Oral Daily Chavo Rodriguez MD        levothyroxine (SYNTHROID) tablet 150 mcg  150 mcg Oral Daily Chavo Rodriguez MD        sodium chloride 0.9 % bolus 1,000 mL  1,000 mL IntraVENous Once Chavo Rodriguez MD          Patient PCP: Lyly Bowman MD  PMH:  has a past medical history of Arthritis, Graves disease, and Hypertension.  PSH:   has a past surgical history that includes Hysterectomy; Bunionectomy; other surgical history; Hysterectomy, vaginal; tracheostomy (N/A, 3/14/2024); IR PORT PLACEMENT > 5 YEARS (5/3/2024); and Upper gastrointestinal endoscopy (N/A, 2024).   FHX: family history includes Heart Failure in her mother; Rheum Arthritis in her sister; Stroke in her sister; Thyroid Disease in her sister.   SHX:  reports that she quit smoking about 10 months ago. Her smoking use included cigarettes. She has never used smokeless tobacco. She reports that she does not drink alcohol and does not use drugs.     ROS:    For ROS refer to HPI.     Objective:     Vital Signs: Telemetry:    normal sinus rhythm Intake/Output:   BP (!) 149/77   Pulse 73   Temp 98.1 °F (36.7 °C) (Oral)   Resp 15   Ht 1.6 m (5' 3\")   Wt 90.7 kg (200 lb)   SpO2 100%   BMI 35.43 kg/m²     Temp (24hrs), Av.3 °F (36.8 °C), Min:97.9 °F (36.6 °C), Max:99 °F (37.2 °C)          O2 Device: None (Room air) O2 Flow Rate (L/min): 10 L/min     Wt Readings from Last 4 Encounters:   24 90.7 kg (200 lb)   24 101.7 kg (224 lb 4.8 oz)   24 105.7 kg (233 lb)   24 108.9 kg (240 lb 1.6 oz)          Intake/Output Summary (Last 24 hours) at 2024 0835  Last data filed at 2024 1638  Gross per 24 hour   Intake 310 ml   Output 450 ml  normal range, however her hemoglobin is decreased to 8.6 g/dL.  The patient also reports that she had a PEG tube placed yesterday.  The patient is currently on Symbicort, Zyrtec, Mucinex, DuoNeb, Synthroid, Mycostatin, lisinopril, Zosyn, Klor-con.

## 2024-07-24 NOTE — PROGRESS NOTES
Hospitalist Progress Note    NAME:   April Mayo   : 1955   MRN: [id number]     Date/Time: 2024 9:52 AM  Patient PCP: Lyly Bowman MD    Estimated discharge date: 24 hours  Barriers: PEG feed toleration      Assessment / Plan:    SIRS, resolving  Likely secondary to right-sided pneumonia as originally evaluated on chest x-ray  Blood cultures NGTD  Continue Zosyn     Acute hypoxic respiratory failure   - now requiring 10 L high flow via trach secondary to bilateral pulmonary emboli and severe aspiration pneumonia  Chronic trach at home, secondary to laryngeal cancer  Pulmonology following    Aspiration pneumonia  Continue on IV Zosyn  Continue on DuoNebs, Symbicort, Zyrtec, Mucinex  Pulmonology following     Moderate burden bilateral acute pulmonary emboli   No significant right heart strain noted on CT angiogram chest.  Echocardiogram without any significant right heart strain.  Switch from heparin GTT to Eliquis p.o. after PEG placement.    Severe acute right-sided DVT  Bilateral common femoral and external iliac vein DVTs on CT abdomen  Transition to Eliquis at discharge     Severe dysphagia  Appreciate ENT and GI consultation.  PEG tube placed , dietitian consulted for PEG tube feeds, will need 24 hours toleration    Mild CHRISTEN, resolved  Creatinine 0.94, baseline 0.76  Cont gentle IV fluids until tube feeds started     Laryngeal cancer  On chemotherapy and radiation therapy per [facility]  Continue outpatient follow-up with oncology    Hypertension  Restart Cozaar 25 mg    1.5 cm metallic foreign body in cecum  Seen on CT abdomen  Could be PillCam or other ingested foreign body  Monitor for any changes in abdominal status    6 mm right upper lobe nodule  Unclear etiology or significance at this time  Monitor on repeat imaging given laryngeal cancer    Hypokalemia  K3.3  Replete    Full CODE STATUS  Medical Decision Making:   I personally reviewed labs: CBC, CMP  I personally reviewed

## 2024-07-24 NOTE — PLAN OF CARE
OCCUPATIONAL THERAPY EVALUATION  Patient: April Mayo (69 y.o. female)  Date: 7/24/2024  Primary Diagnosis: Aspiration pneumonia of right lower lobe due to vomit (HCC) [J69.0]  Procedure(s) (LRB):  ESOPHAGOGASTRODUODENOSCOPY PERCUTANEOUS ENDOSCOPIC GASTROSTOMY TUBE INSERTION (N/A) 1 Day Post-Op   Precautions: NPO, Aspiration Risk, Fall Risk                Recommendations for nursing mobility: Use of bed/chair alarm for safety and Assist x1    In place during session: implantable port, peripheral IV, external urinary catheter, PEG, surgical airway  ASSESSMENT  Pt is a 69 y.o. female presenting [facility] with c/o RLE swelling, decrease oral intake, and generalized weakness. Pt is currently being treated for RLE DVT, aspiration pneumonia, mild CHRISTEN, and HTN. Pt is s/p PEG placement on 7/23 . Pt received semi-supine in bed upon arrival, AXO x4, and agreeable to OT evaluation.     Based on current observations, pt presents with decreased  functional mobility, independence in ADLs, activity tolerance, endurance (see below for objective details and assist levels).     Overall, pt tolerates session fair with 4/10 pain at the PEG site at rest. After tx pt with 7/10 pain at site of PEG.  Pt required MIN A for supine to EOB sitting. Pt was able to maintain safe sitting balance with no LOB noted. Pt was unable to complete dof/don of socks due to difficulty during forward trunk flexion with new PEG. Pt also reports difficulty breathing during forward reaching. OT discussed compensatory strategies and pt reported use of sock aid at home . Pt will benefit from continued skilled OT services to address current impairments and improve IND and safety with self cares and functional transfers/mobility. Potential barriers for safe discharge: concern for pt safely navigating or managing the home environment. Current OT d/c recommendation Skilled Nursing Facilityonce medically appropriate.     Start of Session End of Session  the following complexity level: Low    Pain Ratin/10   Pain Intervention(s):   nursing notified    Activity Tolerance:   Fair  and requires frequent rest breaks    After treatment patient left in no apparent distress:    Patient left in no apparent distress in bed, Call bell within reach, and Bed/ chair alarm activated, bed locked and in lowest position    COMMUNICATION/EDUCATION:   The patient’s plan of care was discussed with: Physical therapist and Registered nurse    Patient Education  Education Given To: Patient  Education Provided: Role of Therapy;Plan of Care;IADL Safety;Precautions;ADL Adaptive Strategies  Education Method: Demonstration;Verbal  Barriers to Learning: None  Education Outcome: Verbalized understanding;Continued education needed    The supervising occupational therapist and treating occupational therapist assistant have met to review this patient’s progress and plan of care.    This patient’s plan of care is appropriate for delegation to RODRIGUE.     PT/OT sessions occurred together for increased safety of pt and clinician.     Thank you for this referral,  Nicole Guillen OT  Minutes: 37

## 2024-07-24 NOTE — PROGRESS NOTES
Hematology/Oncology   Progress Note    Patient: April Mayo MRN: [id number]     YOB: 1955  Age: 69 y.o.  Sex: female      Admit Date: 7/19/2024    LOS: 5 days     Chief Complaint: Dysphagia    Subjective:     Reports dysphagia. Unable to eat solids or liquids. PEG tube palced 7/23.  No new complaints today.    Constitutional No fevers, chills, night sweats   Hematologic/Lymphatic No easy bruising or bleeding.     Respiratory No dyspnea on exertion, cough or hemoptysis.   Cardiovascular No anginal chest pain, tachycardia, palpitations.   Gastrointestinal No nausea, vomiting, diarrhea, constipation, GI bleeding   Genitourinary (M) No hematuria, dysuria   Musculoskeletal No joint pain, swelling or redness.    Neurologic No headache, blurred vision     Current Facility-Administered Medications:     potassium chloride (KLOR-CON M) extended release tablet 40 mEq, 40 mEq, Oral, Once, Ermias Cordon PA-C    lisinopril (PRINIVIL;ZESTRIL) tablet 2.5 mg, 2.5 mg, Oral, Daily, Ermias Cordon PA-C    heparin (porcine) injection 3,600 Units, 40 Units/kg, IntraVENous, PRN, Ermias Cordon PA-MARINA    heparin (porcine) injection 7,300 Units, 80 Units/kg, IntraVENous, PRN, Ermias Cordon PA-C, 7,300 Units at 07/23/24 1830    heparin 25,000 units in dextrose 5% 250 mL (premix) infusion, 5-30 Units/kg/hr, IntraVENous, Continuous, Chavo Rodriguez MD, Last Rate: 15.4 mL/hr at 07/24/24 0249, 17 Units/kg/hr at 07/24/24 0249    ipratropium 0.5 mg-albuterol 2.5 mg (DUONEB) nebulizer solution 1 Dose, 1 Dose, Inhalation, Q6H WA RT, Darek Moscoso MD, 1 Dose at 07/24/24 0918    sucralfate (CARAFATE) tablet 1 g, 1 g, Oral, 3 times per day, Geronimo Villeda MD    nystatin (MYCOSTATIN) 636657 UNIT/ML suspension 500,000 Units, 5 mL, Oral, 4x Daily, Collins Chacon MD, 500,000 Units at 07/22/24 1328    dextrose 5 % and 0.45 % NaCl with KCl 20 mEq infusion, , IntraVENous, Continuous, Chavo Rodriguez,  WBC    nRBC 0.27 (H) 0.00 - 0.01 K/uL    Neutrophils % 84 (H) 32 - 75 %    Band Neutrophils 1 0 - 6 %    Lymphocytes % 5 (L) 12 - 49 %    Monocytes % 8 5 - 13 %    Eosinophils % 0 0 - 7 %    Basophils % 0 0 - 1 %    Myelocytes 2 (H) 0 %    Immature Granulocytes % 0 %    Neutrophils Absolute 8.2 (H) 1.8 - 8.0 K/UL    Lymphocytes Absolute 0.5 (L) 0.8 - 3.5 K/UL    Monocytes Absolute 0.8 0.0 - 1.0 K/UL    Eosinophils Absolute 0.0 0.0 - 0.4 K/UL    Basophils Absolute 0.0 0.0 - 0.1 K/UL    Immature Granulocytes Absolute 0.0 K/UL    Differential Type Manual      RBC Comment Anisocytosis  1+       Comprehensive Metabolic Panel    Collection Time: 07/23/24  4:16 PM   Result Value Ref Range    Sodium 146 (H) 136 - 145 mmol/L    Potassium 3.6 3.5 - 5.1 mmol/L    Chloride 117 (H) 97 - 108 mmol/L    CO2 24 21 - 32 mmol/L    Anion Gap 5 5 - 15 mmol/L    Glucose 104 (H) 65 - 100 mg/dL    BUN 11 6 - 20 mg/dL    Creatinine 0.96 0.55 - 1.02 mg/dL    BUN/Creatinine Ratio 11 (L) 12 - 20      Est, Glom Filt Rate 64 >60 ml/min/1.73m2    Calcium 8.0 (L) 8.5 - 10.1 mg/dL    Total Bilirubin 0.8 0.2 - 1.0 mg/dL    AST 19 15 - 37 U/L    ALT 17 12 - 78 U/L    Alk Phosphatase 79 45 - 117 U/L    Total Protein 6.4 6.4 - 8.2 g/dL    Albumin 2.1 (L) 3.5 - 5.0 g/dL    Globulin 4.3 (H) 2.0 - 4.0 g/dL    Albumin/Globulin Ratio 0.5 (L) 1.1 - 2.2     CBC with Auto Differential    Collection Time: 07/24/24 12:12 AM   Result Value Ref Range    WBC 10.6 3.6 - 11.0 K/uL    RBC 2.84 (L) 3.80 - 5.20 M/uL    Hemoglobin 8.6 (L) 11.5 - 16.0 g/dL    Hematocrit 26.8 (L) 35.0 - 47.0 %    MCV 94.4 80.0 - 99.0 FL    MCH 30.3 26.0 - 34.0 PG    MCHC 32.1 30.0 - 36.5 g/dL    RDW 16.4 (H) 11.5 - 14.5 %    Platelets 259 150 - 400 K/uL    MPV 10.2 8.9 - 12.9 FL    Nucleated RBCs 1.9 (H) 0.0  WBC    nRBC 0.20 (H) 0.00 - 0.01 K/uL    Neutrophils % 76 (H) 32 - 75 %    Band Neutrophils 8 (H) 0 - 6 %    Lymphocytes % 6 (L) 12 - 49 %    Monocytes % 7 5 - 13 %

## 2024-07-24 NOTE — PROGRESS NOTES
4 Eyes Skin Assessment     NAME:  April Mayo  YOB: 1955  MEDICAL RECORD NUMBER:  [id number]    The patient is being assessed for  Other -weekly skin assessment     I agree that at least one RN has performed a thorough Head to Toe Skin Assessment on the patient. ALL assessment sites listed below have been assessed.      Areas assessed by both nurses:    Head, Face, Ears, Shoulders, Back, Chest, Arms, Elbows, Hands, Sacrum. Buttock, Coccyx, Ischium, Legs. Feet and Heels, and Under Medical Devices         Does the Patient have a Wound? Yes wound(s) were present on assessment. LDA wound assessment was Initiated and completed by RN    Wound underneath trach tie on right ride  New PEG tube placed 7/23/24       Joni Prevention initiated by RN: Yes  Wound Care Orders initiated by RN: No    Pressure Injury (Stage 3,4, Unstageable, DTI, NWPT, and Complex wounds) if present, place Wound referral order by RN under : No    New Ostomies, if present place, Ostomy referral order under : No     Nurse 1 eSignature: Electronically signed by Dalila Ni RN on 7/24/24 at 6:34 AM EDT    **SHARE this note so that the co-signing nurse can place an eSignature**    Nurse 2 eSignature: Electronically signed by Ever Kyle RN on 7/24/24 at 6:37 AM EDT

## 2024-07-24 NOTE — PROGRESS NOTES
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   female in consultation     Excerpts from admission 2024 or consult notes as follows:     April Mayo is a 69-year-old female with a history of post ablative hypothyroidism, vocal cord mass, dysphonia, neoplasm of uncertain behavior of the vocal cord, tracheostomy, laryngeal cancer, and aspiration pneumonia, who presented to the emergency department yesterday for difficulty eating, drinking, and throat pain secondary to radiation treatment.  Per the patient's daughter, the patient also experienced right leg swelling, and she reported a odor from her tracheostomy.  The patient's daughter also reports that last week the patient was diagnosed with pneumonia and is experiencing an increase in mucus secretions from the trach as well.        Allergies   Allergen Reactions    Codeine Other (See Comments)     NAUSEA, VOMITING         MAR reviewed and pertinent medications noted or modified as needed     Current Facility-Administered Medications   Medication Dose Route Frequency Provider Last Rate Last Admin    potassium chloride (KLOR-CON M) extended release tablet 40 mEq  40 mEq Oral Once Ermias Cordon PA-C        lisinopril (PRINIVIL;ZESTRIL) tablet 2.5 mg  2.5 mg Oral Daily Ermias Cordon PA-C         Acute superficial vein thrombosis in the right small saphenous vein.  Chest x-ray done yesterday shows increased airspace disease in the right base.  Patient is receiving Symbicort, Zyrtec, Mucinex, Heparin, DuoNeb, Synthroid, Mycostatin, Morphine, Zosyn.        7/21 The patient is currently on 10L of high flow nasal cannula 40% FiO2 the patient's chloride, BUN, BUN/Creatinine, Creatinine, are all elevated. The patient's WBC is within normal range, the patient's hemoglobin concentration is at 8.5 g/dL. The patient remains on Symbicort, Zyrtec, Mucinex, DuoNeb, Synthroid, Mycostatin, Zosyn.  ENT seen the patient and also GI for possible PEG tube placement patient is scheduled for modified barium swallow on Monday, 2D echo pending to see the right heart pressure CAT scan did not show any right heart strain    7/22 the patient is currently receiving 10 L of oxygen through her tracheostomy.  Patient had a echocardiogram done yesterday which shows  Left Ventricle: Normal left ventricular systolic function with a visually estimated EF of 60 - 65%. Left ventricle size is normal. Normal wall thickness. Normal wall motion. Normal diastolic function. Pericardium: Trivial pericardial effusion present. The Right ventricle size is normal. Normal systolic function, TAPSE is 2.2cm. RV peak S is 18cm/s. Right atrium is normal size. No indication of cardiac tamponade. Image quality is technically difficult. Procedure performed with the patient in a sitting position.  The patient is currently on  Symbicort, Zyrtec, Mucinex, DuoNeb, Synthroid, Mycostatin, Zosyn.  Discontinue heparin start patient on Eliquis    7/23 the patient is currently on room air.  The patient is going to be having a PEG tube placed today.  The patient is currently on Symbicort, Zyrtec, Mucinex, DuoNeb, Synthroid, Mycostatin, Zosyn.     7/24 the patient is currently on room air.  The patient has a low potassium.  The patient's white blood cell count is within

## 2024-07-24 NOTE — PLAN OF CARE
PHYSICAL THERAPY EVALUATION  Patient: April Mayo (69 y.o. female)  Date: 7/24/2024  Primary Diagnosis: Aspiration pneumonia of right lower lobe due to vomit (HCC) [J69.0]  Procedure(s) (LRB):  ESOPHAGOGASTRODUODENOSCOPY PERCUTANEOUS ENDOSCOPIC GASTROSTOMY TUBE INSERTION (N/A) 1 Day Post-Op   Precautions: NPO, Aspiration Risk, Fall Risk                      Recommendations for nursing mobility: Encourage HEP in prep for ADLs/mobility; see handout for details, Frequent repositioning to prevent skin breakdown, Use of bed/chair alarm for safety, Use of BSC for toileting , AD and gt belt for bed to chair , and Assist x2    In place during session: Tracheostomy on humidified RA, EKG/telemetry , and PEG Tube    ASSESSMENT  Pt is a 69 y.o. female admitted on 7/19/2024 for progessive worsening weakness and decreased oral intake; PMHx throat cancer, s/p trach to RA, HTN, R chest IR port, ongoing radiation treatments; pt currently being treated for aspiration pneumonia of R lower lobe d/t vomit. Pt semi supine upon PT arrival, agreeable to evaluation. Pt A&O x 4.      Based on the objective data described below, the patient currently presents with impaired functional mobility, decreased independence in ADLs, impaired strength, decreased activity tolerance, impaired balance, and increased pain levels. (See below for objective details and assist levels).     Overall pt tolerated session fair today with c/o 4/10 abdominal pain at PEG insertion site at rest, increased with mobility. Pt required minAx1 for bed mobility and transfers. Pt with increased secretions with initial sup > sit, improved with oral suction and seated rest break. SpO2 maintained 100% throughout session. Pt amb 2 lateral feet with RW, gt belt and minAx1-2; demonstrates decreased step clearance. Pt returned to supine with minAx2 for LE support and hip repositioning. Pt will benefit from continued skilled PT to address above deficits and return to PLOF.  Nursing Facility    IF patient discharges home will need the following DME: patient owns DME required for discharge       SUBJECTIVE:   Patient stated “I feel like I'm throwing up.”    OBJECTIVE DATA SUMMARY:   HISTORY:    Past Medical History:   Diagnosis Date    Arthritis     Graves disease     Hypertension      Past Surgical History:   Procedure Laterality Date    BUNIONECTOMY      HYSTERECTOMY (CERVIX STATUS UNKNOWN)      HYSTERECTOMY, VAGINAL      IR PORT PLACEMENT EQUAL OR GREATER THAN 5 YEARS  5/3/2024    IR PORT PLACEMENT EQUAL OR GREATER THAN 5 YEARS 5/3/2024 Kanchan Gibbs PA [facility] RAD ANGIO IR    OTHER SURGICAL HISTORY      eye surgery for graves disease    TRACHEOSTOMY N/A 3/14/2024    AWAKE URGENT TRACHEOSTOMY AND BIOPSY (LOCAL WITH ZAIDA NIEVES) performed by Vaishali Pringle MD at [facility] MAIN OR    UPPER GASTROINTESTINAL ENDOSCOPY N/A 7/23/2024    ESOPHAGOGASTRODUODENOSCOPY PERCUTANEOUS ENDOSCOPIC GASTROSTOMY TUBE INSERTION performed by Geronimo Villeda MD at [facility] ENDOSCOPY       Home Situation:  Social/Functional History  Lives With: Daughter  Type of Home: House  Home Layout: One level  Home Access: Stairs to enter with rails  Entrance Stairs - Number of Steps: 2  Entrance Stairs - Rails: Left  Home Equipment: Hospital bed  ADL Assistance: Independent  Ambulation Assistance: Independent  Transfer Assistance: Independent    Cognitive/Behavioral Status:  Orientation  Orientation Level: Oriented X4       Skin: intact    Edema: no significant edema noted    Strength:    Strength: Generally decreased, functional    Range Of Motion:  AROM: Within functional limits       Coordination:        Tone & Sensation:             Functional Mobility:  Bed Mobility:     Bed Mobility Training  Bed Mobility Training: Yes  Overall Level of Assistance: Minimum assistance  Interventions: Manual cues;Verbal cues;Tactile cues  Rolling: Minimum assistance  Supine to Sit: Minimum assistance  Sit to Supine: Minimum assistance  Scooting:

## 2024-07-24 NOTE — CARE COORDINATION
Therapy has worked with patient and recommending SNF placement at this time.  CM met with patient at bedside with therapy present.  Patient is agreeable. Choice list given to patient to review.  CM explained that we will need to find a SNF that can accommodate patients needs.  Patient verbalized understanding.  Patient would like to discuss SNF choices at a later time to be able to review choice list.  CM team to follow up with patient at a later time.  CM informed patient that we will need to find an accepting facility, then the insurance authorization will be requested and it will generally take 1-3 days. Patient verbalized understanding.

## 2024-07-25 PROBLEM — J69.0 ASPIRATION PNEUMONIA OF RIGHT LOWER LOBE DUE TO VOMIT (HCC): Status: RESOLVED | Noted: 2024-07-19 | Resolved: 2024-07-25

## 2024-07-25 LAB
ALBUMIN SERPL-MCNC: 1.8 G/DL (ref 3.5–5)
ALBUMIN/GLOB SERPL: 0.4 (ref 1.1–2.2)
ALP SERPL-CCNC: 67 U/L (ref 45–117)
ALT SERPL-CCNC: 14 U/L (ref 12–78)
ANION GAP SERPL CALC-SCNC: 7 MMOL/L (ref 5–15)
AST SERPL W P-5'-P-CCNC: 18 U/L (ref 15–37)
BASOPHILS # BLD: 0 K/UL (ref 0–0.1)
BASOPHILS NFR BLD: 0 % (ref 0–1)
BILIRUB SERPL-MCNC: 0.6 MG/DL (ref 0.2–1)
BUN SERPL-MCNC: 9 MG/DL (ref 6–20)
BUN/CREAT SERPL: 9 (ref 12–20)
CA-I BLD-MCNC: 7.8 MG/DL (ref 8.5–10.1)
CHLORIDE SERPL-SCNC: 113 MMOL/L (ref 97–108)
CO2 SERPL-SCNC: 24 MMOL/L (ref 21–32)
CREAT SERPL-MCNC: 0.96 MG/DL (ref 0.55–1.02)
DIFFERENTIAL METHOD BLD: ABNORMAL
EOSINOPHIL # BLD: 0 K/UL (ref 0–0.4)
EOSINOPHIL NFR BLD: 0 % (ref 0–7)
ERYTHROCYTE [DISTWIDTH] IN BLOOD BY AUTOMATED COUNT: 16.8 % (ref 11.5–14.5)
GLOBULIN SER CALC-MCNC: 4.3 G/DL (ref 2–4)
GLUCOSE SERPL-MCNC: 108 MG/DL (ref 65–100)
HCT VFR BLD AUTO: 25.2 % (ref 35–47)
HGB BLD-MCNC: 8 G/DL (ref 11.5–16)
IMM GRANULOCYTES # BLD AUTO: 0 K/UL
IMM GRANULOCYTES NFR BLD AUTO: 0 %
LYMPHOCYTES # BLD: 0.7 K/UL (ref 0.8–3.5)
LYMPHOCYTES NFR BLD: 7 % (ref 12–49)
MAGNESIUM SERPL-MCNC: 1.6 MG/DL (ref 1.6–2.4)
MCH RBC QN AUTO: 29.9 PG (ref 26–34)
MCHC RBC AUTO-ENTMCNC: 31.7 G/DL (ref 30–36.5)
MCV RBC AUTO: 94 FL (ref 80–99)
METAMYELOCYTES NFR BLD MANUAL: 1 %
MONOCYTES # BLD: 0.7 K/UL (ref 0–1)
MONOCYTES NFR BLD: 8 % (ref 5–13)
MYELOCYTES NFR BLD MANUAL: 8 %
NEUTS BAND NFR BLD MANUAL: 4 % (ref 0–6)
NEUTS SEG # BLD: 7.1 K/UL (ref 1.8–8)
NEUTS SEG NFR BLD: 72 % (ref 32–75)
NRBC # BLD: 0.25 K/UL (ref 0–0.01)
NRBC BLD-RTO: 2.7 PER 100 WBC
PLATELET # BLD AUTO: 269 K/UL (ref 150–400)
PMV BLD AUTO: 10.9 FL (ref 8.9–12.9)
POTASSIUM SERPL-SCNC: 3.1 MMOL/L (ref 3.5–5.1)
PROT SERPL-MCNC: 6.1 G/DL (ref 6.4–8.2)
RBC # BLD AUTO: 2.68 M/UL (ref 3.8–5.2)
RBC MORPH BLD: ABNORMAL
RBC MORPH BLD: ABNORMAL
SODIUM SERPL-SCNC: 144 MMOL/L (ref 136–145)
UFH PPP CHRO-ACNC: 0.75 IU/ML
UFH PPP CHRO-ACNC: 0.92 IU/ML
WBC # BLD AUTO: 9.3 K/UL (ref 3.6–11)

## 2024-07-25 PROCEDURE — 1100000000 HC RM PRIVATE

## 2024-07-25 PROCEDURE — 83735 ASSAY OF MAGNESIUM: CPT

## 2024-07-25 PROCEDURE — 94761 N-INVAS EAR/PLS OXIMETRY MLT: CPT

## 2024-07-25 PROCEDURE — 6370000000 HC RX 637 (ALT 250 FOR IP): Performed by: PHYSICIAN ASSISTANT

## 2024-07-25 PROCEDURE — 97530 THERAPEUTIC ACTIVITIES: CPT

## 2024-07-25 PROCEDURE — 6370000000 HC RX 637 (ALT 250 FOR IP): Performed by: INTERNAL MEDICINE

## 2024-07-25 PROCEDURE — 85520 HEPARIN ASSAY: CPT

## 2024-07-25 PROCEDURE — 36415 COLL VENOUS BLD VENIPUNCTURE: CPT

## 2024-07-25 PROCEDURE — 6370000000 HC RX 637 (ALT 250 FOR IP): Performed by: EMERGENCY MEDICINE

## 2024-07-25 PROCEDURE — 2580000003 HC RX 258: Performed by: INTERNAL MEDICINE

## 2024-07-25 PROCEDURE — 6370000000 HC RX 637 (ALT 250 FOR IP): Performed by: HOSPITALIST

## 2024-07-25 PROCEDURE — 6360000002 HC RX W HCPCS: Performed by: INTERNAL MEDICINE

## 2024-07-25 PROCEDURE — 80053 COMPREHEN METABOLIC PANEL: CPT

## 2024-07-25 PROCEDURE — 2700000000 HC OXYGEN THERAPY PER DAY

## 2024-07-25 PROCEDURE — 85025 COMPLETE CBC W/AUTO DIFF WBC: CPT

## 2024-07-25 PROCEDURE — 6360000002 HC RX W HCPCS: Performed by: PHYSICIAN ASSISTANT

## 2024-07-25 PROCEDURE — 94640 AIRWAY INHALATION TREATMENT: CPT

## 2024-07-25 RX ORDER — LOPERAMIDE HYDROCHLORIDE 2 MG/1
2 CAPSULE ORAL 4 TIMES DAILY PRN
Status: DISCONTINUED | OUTPATIENT
Start: 2024-07-25 | End: 2024-07-26 | Stop reason: HOSPADM

## 2024-07-25 RX ORDER — POTASSIUM CHLORIDE 20 MEQ/1
40 TABLET, EXTENDED RELEASE ORAL ONCE
Status: COMPLETED | OUTPATIENT
Start: 2024-07-25 | End: 2024-07-25

## 2024-07-25 RX ORDER — MAGNESIUM SULFATE IN WATER 40 MG/ML
2000 INJECTION, SOLUTION INTRAVENOUS ONCE
Status: COMPLETED | OUTPATIENT
Start: 2024-07-25 | End: 2024-07-25

## 2024-07-25 RX ORDER — SCOLOPAMINE TRANSDERMAL SYSTEM 1 MG/1
1 PATCH, EXTENDED RELEASE TRANSDERMAL
Status: DISCONTINUED | OUTPATIENT
Start: 2024-07-25 | End: 2024-07-26 | Stop reason: HOSPADM

## 2024-07-25 RX ADMIN — CETIRIZINE HYDROCHLORIDE 10 MG: 10 TABLET, FILM COATED ORAL at 09:19

## 2024-07-25 RX ADMIN — PIPERACILLIN AND TAZOBACTAM 4500 MG: 4; .5 INJECTION, POWDER, LYOPHILIZED, FOR SOLUTION INTRAVENOUS at 11:26

## 2024-07-25 RX ADMIN — SODIUM CHLORIDE, PRESERVATIVE FREE 10 ML: 5 INJECTION INTRAVENOUS at 09:24

## 2024-07-25 RX ADMIN — MAGNESIUM SULFATE HEPTAHYDRATE 2000 MG: 2 INJECTION, SOLUTION INTRAVENOUS at 09:15

## 2024-07-25 RX ADMIN — Medication 2 PUFF: at 20:06

## 2024-07-25 RX ADMIN — LOPERAMIDE HYDROCHLORIDE 2 MG: 2 CAPSULE ORAL at 14:35

## 2024-07-25 RX ADMIN — Medication 60 MG: at 20:55

## 2024-07-25 RX ADMIN — APIXABAN 10 MG: 5 TABLET, FILM COATED ORAL at 20:55

## 2024-07-25 RX ADMIN — Medication 60 MG: at 09:18

## 2024-07-25 RX ADMIN — IPRATROPIUM BROMIDE AND ALBUTEROL SULFATE 1 DOSE: 2.5; .5 SOLUTION RESPIRATORY (INHALATION) at 08:37

## 2024-07-25 RX ADMIN — POTASSIUM CHLORIDE 40 MEQ: 1500 TABLET, EXTENDED RELEASE ORAL at 05:10

## 2024-07-25 RX ADMIN — IPRATROPIUM BROMIDE AND ALBUTEROL SULFATE 1 DOSE: 2.5; .5 SOLUTION RESPIRATORY (INHALATION) at 20:06

## 2024-07-25 RX ADMIN — Medication 2 PUFF: at 08:37

## 2024-07-25 RX ADMIN — IPRATROPIUM BROMIDE AND ALBUTEROL SULFATE 1 DOSE: 2.5; .5 SOLUTION RESPIRATORY (INHALATION) at 13:39

## 2024-07-25 RX ADMIN — LOSARTAN POTASSIUM 25 MG: 50 TABLET, FILM COATED ORAL at 09:19

## 2024-07-25 RX ADMIN — PIPERACILLIN AND TAZOBACTAM 4500 MG: 4; .5 INJECTION, POWDER, LYOPHILIZED, FOR SOLUTION INTRAVENOUS at 18:09

## 2024-07-25 RX ADMIN — NYSTATIN 500000 UNITS: 100000 SUSPENSION ORAL at 20:55

## 2024-07-25 RX ADMIN — APIXABAN 10 MG: 5 TABLET, FILM COATED ORAL at 11:26

## 2024-07-25 RX ADMIN — SODIUM CHLORIDE, PRESERVATIVE FREE 10 ML: 5 INJECTION INTRAVENOUS at 20:56

## 2024-07-25 RX ADMIN — LEVOTHYROXINE SODIUM 150 MCG: 0.07 TABLET ORAL at 05:10

## 2024-07-25 ASSESSMENT — ENCOUNTER SYMPTOMS
TROUBLE SWALLOWING: 1
WHEEZING: 0
SHORTNESS OF BREATH: 1
BACK PAIN: 0
COUGH: 0
VOMITING: 0
CHOKING: 1
NAUSEA: 0

## 2024-07-25 NOTE — PROGRESS NOTES
OCCUPATIONAL THERAPY TREATMENT  Patient: April Mayo (69 y.o. female)  Date: 7/25/2024  Primary Diagnosis: Aspiration pneumonia of right lower lobe due to vomit (HCC) [J69.0]  Procedure(s) (LRB):  ESOPHAGOGASTRODUODENOSCOPY PERCUTANEOUS ENDOSCOPIC GASTROSTOMY TUBE INSERTION (N/A) 2 Days Post-Op   Precautions: NPO, Aspiration Risk, Fall Risk                Recommendations for nursing mobility: Encourage HEP in prep for ADLs/mobility; see handout for details, Frequent repositioning to prevent skin breakdown, and Use of bed/chair alarm for safety    In place during session: Peripheral IV, Tracheostomy  , External Catheter, EKG/telemetry , and PEG Tube  Chart, occupational therapy assessment, plan of care, and goals were reviewed.  ASSESSMENT  Patient continues with skilled OT services and is slowly progressing towards goals. Pt semi supine in bed upon PTA/ AC arrival, agreeable to session. Pt seen as a co-tx d/t limited activity tolerance and level of assistance needed for STS. Pt A&O x 4. Pt completed bed mobility w/ over all min/cga and tolerated eob for ~ 20 minutes and completed 1 sts w/ min A x 2. Pt completed UE therex seated eob w/ good sitting balance. See grid below for details, completed to maintain/ increase strength and endurance to aid in adl performance. Pt returned to semi supine and completed light grooming w/ set up and sba.  All known needs met. (See below for objective details and assist levels).     Overall pt tolerated session fair today with frequent rest breaks.  Potential barriers for safe discharge: pt is not safe to be alone, concern for pt safely navigating or managing the home environment, and pt has new weight bearing restrictions limiting activity or patient is unable to maintain. Current OT recommendations for discharge Skilled Nursing Facility. Will continue to benefit from skilled OT services, and will continue to progress as tolerated.       GOALS:    Problem: Occupational  Contact-guard assistance  Sit to Supine: Minimum assistance  Scooting: Contact-guard assistance    Transfers:  Transfer Training  Sit to Stand: Minimum assistance;Assist X2  Stand to Sit: Minimum assistance;Assist X2      Balance:  Balance  Sitting: Intact  Standing: Impaired  Standing - Static: Good;Constant support  Standing - Dynamic: Fair;Constant support      ADL Intervention:    Grooming: Setup   Grooming Skilled Clinical Factors: to wash face, I w/ doff/ rafael glasses.    Toileting: Dependent/Total  Toileting Skilled Clinical Factors: for pericare semi supine in bed.    Therapeutic exercise:  Completed seated eob w/ min verbal/ visual cues for proper technique and self pacing.  Exercise Sets Reps AROM AAROM PROM Self PROM Comments   Shoulder flex/ext 1 12 [x] [] [] []    Elbow flex/ext 1 12 [x] [] [] []    Wrist flex/ext 1 12 [x] [] [] []    Hand flex/ ext 1 12 [x] [] [] []      Pain Ratin/10   Pain Intervention(s):   pain is at a level acceptable to the patient    Activity Tolerance:   Fair  and requires rest breaks    After treatment patient left in no apparent distress:   Bed locked and returned to lowest position, Patient left in no apparent distress in bed, Call bell within reach, Bed/ chair alarm activated, and Side rails x3, and nsg updated     COMMUNICATION/EDUCATION:   The patient’s plan of care was discussed with: Physical therapy assistant and Registered nurse  PT/OT sessions occurred together for increased safety of pt and clinician.    Patient Education  Education Given To: Patient  Education Provided: Role of Therapy;Plan of Care;IADL Safety;Precautions;ADL Adaptive Strategies  Education Method: Demonstration;Verbal  Barriers to Learning: None  Education Outcome: Verbalized understanding;Continued education needed    Thank you for this referral.  RODRIGUE Flanagan  Minutes: 38

## 2024-07-25 NOTE — PLAN OF CARE
Problem: Discharge Planning  Goal: Discharge to home or other facility with appropriate resources  Outcome: Progressing     Problem: Pain  Goal: Verbalizes/displays adequate comfort level or baseline comfort level  Outcome: Progressing     Problem: Skin/Tissue Integrity  Goal: Absence of new skin breakdown  Description: 1.  Monitor for areas of redness and/or skin breakdown  2.  Assess vascular access sites hourly  3.  Every 4-6 hours minimum:  Change oxygen saturation probe site  4.  Every 4-6 hours:  If on nasal continuous positive airway pressure, respiratory therapy assess nares and determine need for appliance change or resting period.  Outcome: Progressing     Problem: ABCDS Injury Assessment  Goal: Absence of physical injury  Outcome: Progressing     Problem: Safety - Adult  Goal: Free from fall injury  Outcome: Progressing     Problem: SLP Adult - Impaired Swallowing  Goal: By Discharge: Advance to least restrictive diet without signs or symptoms of aspiration for planned discharge setting.  See evaluation for individualized goals.  Description: Speech Therapy Goals  Initiated 7/20/2024  -Patient stated goal: \"I don't want to cough when eating/drinking\"  -Patient will participate in modified barium swallow study within 7 day(s).         [ ] Not met  [ ]  MET   [ ] Progressing  [ ] Discontinue  -Patient will demonstrate understanding of swallow safety precautions and aspiration precautions, diet recs within 7 day(s).        [ ] Not met  [ ]  MET   [ ] Progressing  [ ] Discontinue   7/24/2024 1325 by Niharika Stuart, SLP  Outcome: Progressing     Problem: Occupational Therapy - Adult  Goal: By Discharge: Performs self-care activities at highest level of function for planned discharge setting.  See evaluation for individualized goals.  Description: FUNCTIONAL STATUS PRIOR TO ADMISSION:  Prior to recent admission, pt was living at home with her daughter and was independent up until recently after  chemo.    HOME SUPPORT: The patient lived with her daughter but did not require assistance.    Occupational Therapy Goals:  Initiated 7/24/2024  Patient/Family stated goal: return home  1.  Patient will perform grooming with [place] within 7 day(s).  2.  Patient will perform upper body dressing with [place] within 7 day(s).  3.  Patient will perform lower body dressing with [place] within 7 day(s).  4.  Patient will perform toilet transfers with [place]  within 7 day(s).  5.  Patient will perform all aspects of toileting with [place] within 7 day(s).  6.  Patient will participate in upper extremity therapeutic exercise/activities with [place] within 7 day(s).    7/24/2024 1540 by Nicole Guillen OT  Outcome: Progressing     Problem: Physical Therapy - Adult  Goal: By Discharge: Performs mobility at highest level of function for planned discharge setting.  See evaluation for individualized goals.  Description: FUNCTIONAL STATUS PRIOR TO ADMISSION: Patient was independent and active without use of DME.    HOME SUPPORT PRIOR TO ADMISSION: The patient lived with daughter and required minimal assistance for some ADLs.    Physical Therapy Goals  Initiated 7/24/2024  Pt stated goal: to go home   Pt will be I with LE HEP in 7 days.  Pt will perform bed mobility with Modified [place] in 7 days.  Pt will perform transfers with Modified [place] in 7 days.   Pt will amb 25-50 feet with LRAD safely with Stand by Assist in 7 days.  Pt will ascend/descend 2 steps with L handrail(s) and Minimal Assist in 7 days to safely enter/navigate home.   Pt will demonstrate improvement in standing balance from fair to good in 7 days.     7/24/2024 1630 by Zainab Cooper, RICHELLE  Outcome: Progressing

## 2024-07-25 NOTE — PLAN OF CARE
Problem: Discharge Planning  Goal: Discharge to home or other facility with appropriate resources  Outcome: Progressing     Problem: Pain  Goal: Verbalizes/displays adequate comfort level or baseline comfort level  Outcome: Progressing     Problem: Skin/Tissue Integrity  Goal: Absence of new skin breakdown  Description: 1.  Monitor for areas of redness and/or skin breakdown  2.  Assess vascular access sites hourly  3.  Every 4-6 hours minimum:  Change oxygen saturation probe site  4.  Every 4-6 hours:  If on nasal continuous positive airway pressure, respiratory therapy assess nares and determine need for appliance change or resting period.  Outcome: Progressing     Problem: Safety - Adult  Goal: Free from fall injury  Outcome: Progressing     Problem: Occupational Therapy - Adult  Goal: By Discharge: Performs self-care activities at highest level of function for planned discharge setting.  See evaluation for individualized goals.  Description: FUNCTIONAL STATUS PRIOR TO ADMISSION:  Prior to recent admission, pt was living at home with her daughter and was independent up until recently after chemo.    HOME SUPPORT: The patient lived with her daughter but did not require assistance.    Occupational Therapy Goals:  Initiated 7/24/2024  Patient/Family stated goal: return home  1.  Patient will perform grooming with [place] within 7 day(s).  2.  Patient will perform upper body dressing with [place] within 7 day(s).  3.  Patient will perform lower body dressing with [place] within 7 day(s).  4.  Patient will perform toilet transfers with [place]  within 7 day(s).  5.  Patient will perform all aspects of toileting with [place] within 7 day(s).  6.  Patient will participate in upper extremity therapeutic exercise/activities with [place] within 7 day(s).    7/25/2024 1032 by Iesha Bull OTA  Outcome: Progressing     Problem: Physical Therapy - Adult  Goal: By Discharge: Performs mobility

## 2024-07-25 NOTE — PROGRESS NOTES
Thick yellow secretions. Pt has strong cough but secretions get stuck in T-piece. Had to change t-piece a couple of times. No respiratory distress. Pt on 21%@ 10LPM

## 2024-07-25 NOTE — PROGRESS NOTES
Hematology/Oncology   Progress Note    Patient: April Mayo MRN: [id number]     YOB: 1955  Age: 69 y.o.  Sex: female      Admit Date: 7/19/2024    LOS: 6 days     Chief Complaint: Dysphagia    Subjective:     Reports dysphagia. Unable to eat solids or liquids. PEG tube placed 7/23. No new complaints today.    Constitutional No fevers, chills, night sweats   Hematologic/Lymphatic No easy bruising or bleeding.     Respiratory No dyspnea on exertion, cough or hemoptysis.   Cardiovascular No anginal chest pain, tachycardia, palpitations.   Gastrointestinal No nausea, vomiting, diarrhea, constipation, GI bleeding   Genitourinary (M) No hematuria, dysuria   Musculoskeletal No joint pain, swelling or redness.    Neurologic No headache, blurred vision     Current Facility-Administered Medications:     magnesium sulfate 2000 mg in 50 mL IVPB premix, 2,000 mg, IntraVENous, Once, Ermias Cordon PA-C, Last Rate: 25 mL/hr at 07/25/24 0915, 2,000 mg at 07/25/24 0915    losartan (COZAAR) tablet 25 mg, 25 mg, Oral, Daily, Ermias Cordon PA-C, 25 mg at 07/25/24 0919    dextromethorphan (DELSYM) 30 MG/5ML extended release liquid 60 mg, 60 mg, Oral, 2 times per day, Ermias Cordon PA-C, 60 mg at 07/25/24 0918    heparin (porcine) injection 3,600 Units, 40 Units/kg, IntraVENous, PRN, Ermias Cordon PA-C    heparin (porcine) injection 7,300 Units, 80 Units/kg, IntraVENous, PRN, Ermias Cordon PA-C, 7,300 Units at 07/23/24 1830    heparin 25,000 units in dextrose 5% 250 mL (premix) infusion, 5-30 Units/kg/hr, IntraVENous, Continuous, Chavo Rodriguez MD, Last Rate: 11.8 mL/hr at 07/25/24 0409, 13 Units/kg/hr at 07/25/24 0409    ipratropium 0.5 mg-albuterol 2.5 mg (DUONEB) nebulizer solution 1 Dose, 1 Dose, Inhalation, Q6H DYANA VERGARA, Darek Moscoso MD, 1 Dose at 07/25/24 0837    nystatin (MYCOSTATIN) [id number] UNIT/ML suspension 500,000 Units, 5 mL, Oral, 4x Daily, Collins Chacon,

## 2024-07-25 NOTE — CARE COORDINATION
CM reviewed chart.     DCP is home with [facility] and [facility] for tube feeding supplies.     CM met with patient and family f/f at bedside and discussed recs for SNF. Patient states that she is not interested in SNF and wants to go home with HH services.     Per provider patient has another 24-48 hours before they may be medically ready for dc pending IV abx, cultures, and improvement in K.     CM will continue to follow.

## 2024-07-25 NOTE — PROGRESS NOTES
CHAU [place] at 9:30 p.m. on 7/19/2024. Electronically signed by Christina Gonzales    Vascular duplex lower extremity venous right    Result Date: 7/19/2024    Acute non-occlusive deep vein thrombosis in the right external iliac vein.   Acute non-occlusive deep vein thrombosis in the right common femoral vein.   Acute occlusive deep vein thrombosis in the right femoral vein.   Acute occlusive deep vein thrombosis in the right popliteal vein.   Acute deep vein thrombosis in the right gastrocnemius vein.   Acute deep vein thrombosis in the right posterior tibial vein.   Acute deep vein thrombosis in the right peroneal vein.   Acute superficial vein thrombosis in the right small saphenous vein.     XR CHEST PORTABLE    Result Date: 7/19/2024  EXAM: XR CHEST PORTABLE INDICATION:  R/o Continuing Pneumonia COMPARISON: 7/12/24 TECHNIQUE: Portable chest view FINDINGS:  Tracheostomy and right IJ portacath unchanged.  The cardiac size is within normal limits. The pulmonary vasculature is within normal limits. Increased airspace disease is present in the right base.  No pleural effusion or pneumothorax.  The visualized bones and upper abdomen are age-appropriate.     Increased airspace disease is present in the right base.  Electronically signed by ARMANDO TONY       ARTERIAL BLOOD GAS  No results for input(s): \"PHART\", \"YNO9NVG\", \"PO2ART\", \"ZQJ9YLS\", \"BEART\", \"XUT5PJXW\", \"HGBART\", \"TX0ASOVMC\", \"FIO2A\", \"J3XNDMUF\", \"OXYHEM\", \"CARBOXHGBART\", \"METHGBART\", \"A1FTVCTJQ\", \"PHCORART\", \"TEMP\" in the last 72 hours.    Invalid input(s): \"YXK6YTJFE\"  No results found for this or any previous visit.    IMPRESSION:   Acute chronic respiratory failure with Hypoxia   Acute pulmonary emboli  Acute right-sided DVT   Aspiration pneumonia   Mild CHRISTEN   Hypertension stable  Dysphagia secondary to radiation treatment  Body mass index is 35.43 kg/m².  COPD stable  History of overactive bladder      RECOMMENDATIONS/PLAN:     69-year-old lady came in because  right small saphenous vein.  Chest x-ray done yesterday shows increased airspace disease in the right base.  Patient is receiving Symbicort, Zyrtec, Mucinex, Heparin, DuoNeb, Synthroid, Mycostatin, Morphine, Zosyn.        7/21 The patient is currently on 10L of high flow nasal cannula 40% FiO2 the patient's chloride, BUN, BUN/Creatinine, Creatinine, are all elevated. The patient's WBC is within normal range, the patient's hemoglobin concentration is at 8.5 g/dL. The patient remains on Symbicort, Zyrtec, Mucinex, DuoNeb, Synthroid, Mycostatin, Zosyn.  ENT seen the patient and also GI for possible PEG tube placement patient is scheduled for modified barium swallow on Monday, 2D echo pending to see the right heart pressure CAT scan did not show any right heart strain    7/22 the patient is currently receiving 10 L of oxygen through her tracheostomy.  Patient had a echocardiogram done yesterday which shows  Left Ventricle: Normal left ventricular systolic function with a visually estimated EF of 60 - 65%. Left ventricle size is normal. Normal wall thickness. Normal wall motion. Normal diastolic function. Pericardium: Trivial pericardial effusion present. The Right ventricle size is normal. Normal systolic function, TAPSE is 2.2cm. RV peak S is 18cm/s. Right atrium is normal size. No indication of cardiac tamponade. Image quality is technically difficult. Procedure performed with the patient in a sitting position.  The patient is currently on  Symbicort, Zyrtec, Mucinex, DuoNeb, Synthroid, Mycostatin, Zosyn.  Discontinue heparin start patient on Eliquis    7/23 the patient is currently on room air.  The patient is going to be having a PEG tube placed today.  The patient is currently on Symbicort, Zyrtec, Mucinex, DuoNeb, Synthroid, Mycostatin, Zosyn.     7/24 the patient is currently on room air.  The patient has a low potassium.  The patient's white blood cell count is within normal range, however her hemoglobin is  decreased to 8.6 g/dL.  The patient also reports that she had a PEG tube placed yesterday.  The patient is currently on Symbicort, Zyrtec, Mucinex, DuoNeb, Synthroid, Mycostatin, lisinopril, Zosyn, Klor-con.  ENT saw the patient with PET scan as an outpatient no decannulation present time for the plan as an outpatient regarding decannulation and treatment for laryngeal cancer    7/25 the patient is currently on room air.  The patient's potassium remains low.  The patient's BUN/creatinine ratio is also low.  The patient's red blood cell count is decreased to 8 g/dL.  The patient's white blood cell count is within normal range. The patient is currently on Symbicort, Zyrtec, Delsym, DuoNeb, Synthroid, Mycostatin, Zosyn, Klor-con.  The nurse indicates that the patient has been experiencing an increase in secretions.  Plan is to suction the secretions, and send some for culture.  Will discontinue heparin start patient on Eliquis

## 2024-07-25 NOTE — PLAN OF CARE
PHYSICAL THERAPY TREATMENT     Patient: April Mayo (69 y.o. female)  Date: 7/25/2024  Diagnosis: Aspiration pneumonia of right lower lobe due to vomit (HCC) [J69.0] Aspiration pneumonia of right lower lobe due to vomit (HCC)  Procedure(s) (LRB):  ESOPHAGOGASTRODUODENOSCOPY PERCUTANEOUS ENDOSCOPIC GASTROSTOMY TUBE INSERTION (N/A) 2 Days Post-Op  Precautions: NPO, Aspiration Risk, Fall Risk                      Recommendations for nursing mobility: Encourage HEP in prep for ADLs/mobility; see handout for details, Frequent repositioning to prevent skin breakdown, and LE elevation for management of edema    In place during session: Peripheral IV, Tracheostomy  , External Catheter, EKG/telemetry , and PEG Tube  Chart, physical therapy assessment, plan of care and goals were reviewed.  ASSESSMENT  Patient continues with skilled PT services and is progressing towards goals. Pt sitting in the bed upon PT arrival, agreeable to session. Pt A&O x 4. (See below for objective details and assist levels).     Overall pt tolerated session well today but does req rest breaks and education for energy conversation. Pt overall required min Ax 1-2 for all mobility and was able to complete standing at EOB with assist for pericare/linen change and able to complete a few side steps with RW. Limited distance secondary to multiple lines/lead attachments at the wall. Tolerated LE therex well. Will continue to benefit from skilled PT services, and will continue to progress as tolerated. Potential barriers for safe discharge: pt is a high fall risk and concern for pt safely navigating or managing the home environment. Current PT DC recommendation Skilled Nursing Facility once medically appropriate.     Start of Session End of Session   SPO2 (%) 98 98   Heart Rate (BPM) 73 95     GOALS:    Problem: Physical Therapy - Adult  Goal: By Discharge: Performs mobility at highest level of function for planned discharge setting.  See  evaluation for individualized goals.  Description: FUNCTIONAL STATUS PRIOR TO ADMISSION: Patient was independent and active without use of DME.    HOME SUPPORT PRIOR TO ADMISSION: The patient lived with daughter and required minimal assistance for some ADLs.    Physical Therapy Goals  Initiated 7/24/2024  Pt stated goal: to go home   Pt will be I with LE HEP in 7 days.  Pt will perform bed mobility with Modified [place] in 7 days.  Pt will perform transfers with Modified [place] in 7 days.   Pt will amb 25-50 feet with LRAD safely with Stand by Assist in 7 days.  Pt will ascend/descend 2 steps with L handrail(s) and Minimal Assist in 7 days to safely enter/navigate home.   Pt will demonstrate improvement in standing balance from fair to good in 7 days.     Outcome: Progressing          PLAN :  Patient continues to benefit from skilled intervention to address functional impairments. Continue treatment per established plan of care to address goals.    Recommendation for discharge: (in order for the patient to meet his/her long term goals)  Skilled Nursing Facility    IF patient discharges home will need the following DME:continuing to assess with progress     SUBJECTIVE:   Patient stated “My daughter will help me at home”    OBJECTIVE DATA SUMMARY:   Critical Behavior:  Orientation  Overall Orientation Status: Within Normal Limits  Orientation Level: Oriented X4       Functional Mobility Training:  Bed Mobility:  Bed Mobility Training  Overall Level of Assistance: Minimum assistance  Interventions: Manual cues;Verbal cues;Tactile cues  Rolling: Contact-guard assistance  Supine to Sit: Contact-guard assistance  Sit to Supine: Minimum assistance  Scooting: Contact-guard assistance  Transfers:  Transfer Training  Sit to Stand: Minimum assistance;Assist X2  Stand to Sit: Minimum assistance;Assist X2  Balance:  Balance  Sitting: Intact  Standing: Impaired  Standing - Static: Good;Constant support  Standing -

## 2024-07-25 NOTE — PROGRESS NOTES
Hospitalist Progress Note    NAME:   April Mayo   : 1955   MRN: [id number]     Date/Time: 2024 1:20 PM  Patient PCP: Lyly Bowman MD    Estimated discharge date: 24-48 hours  Barriers: IV abx, secretion culture, improvement in K      Assessment / Plan:    Sepsis, resolving  Aspiration pneumonia  Likely secondary to right-sided pneumonia as originally evaluated on chest x-ray  Blood cultures NGTD  Continue Zosyn, Zyrtec, Mucinex  Culture secretions per pulmonology     Acute hypoxic respiratory failure  Requiring 10 L high flow via trach secondary to bilateral pulmonary emboli and severe aspiration pneumonia  Chronic trach at home, secondary to laryngeal cancer  Pulmonology following  Continue DuoNebs, Symbicort     Moderate burden bilateral acute pulmonary emboli  Severe acute right-sided DVT  No significant right heart strain noted on CT angiogram chest  Bilateral common femoral and external iliac vein DVTs on CT abdomen  Echocardiogram without any significant right heart strain.  Switch from heparin GTT to Eliquis     Severe dysphagia  Appreciate ENT and GI consultation  PEG tube placed , tolerating tube feeds    Mild CHRISTEN, resolved  Creatinine 0.96, baseline 0.76     Laryngeal cancer  On chemotherapy and radiation therapy per [facility]  Continue outpatient follow-up with oncology    Hypertension  Continue Cozaar 25 mg    1.5 cm metallic foreign body in cecum  Seen on CT abdomen  Could be PillCam or other ingested foreign body  Monitor for any changes in abdominal status    6 mm right upper lobe nodule  Unclear etiology or significance at this time  Monitor on repeat imaging given laryngeal cancer    Hypokalemia  K 3.3, repeat 3.1  Mg 1.6  Replete    Anemia, unspecified  Likely 2/2 chemo and low nutrition  Monitor and transfuse for hgb <7.0    Full CODE STATUS  Medical Decision Making:   I personally reviewed labs: CBC, CMP  I personally reviewed imaging: None  I personally reviewed

## 2024-07-25 NOTE — PROGRESS NOTES
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   female in consultation     Excerpts from admission 2024 or consult notes as follows:     April Mayo is a 69-year-old female with a history of post ablative hypothyroidism, vocal cord mass, dysphonia, neoplasm of uncertain behavior of the vocal cord, tracheostomy, laryngeal cancer, and aspiration pneumonia, who presented to the emergency department yesterday for difficulty eating, drinking, and throat pain secondary to radiation treatment.  Per the patient's daughter, the patient also experienced right leg swelling, and she reported a odor from her tracheostomy.  The patient's daughter also reports that last week the patient was diagnosed with pneumonia and is experiencing an increase in mucus secretions from the trach as well.        Allergies   Allergen Reactions    Codeine Other (See Comments)     NAUSEA, VOMITING         MAR reviewed and pertinent medications noted or modified as needed     Current Facility-Administered Medications   Medication Dose Route Frequency Provider Last Rate Last Admin    magnesium sulfate 2000 mg in 50 mL IVPB premix  2,000 mg IntraVENous Once Ermias Cordon PA-C        losartan (COZAAR) tablet 25 mg  25 mg Oral Daily Ermias Cordon PA-C   25 mg at 24 2447     shows increased airspace disease in the right base.  Patient is receiving Symbicort, Zyrtec, Mucinex, Heparin, DuoNeb, Synthroid, Mycostatin, Morphine, Zosyn.        7/21 The patient is currently on 10L of high flow nasal cannula 40% FiO2 the patient's chloride, BUN, BUN/Creatinine, Creatinine, are all elevated. The patient's WBC is within normal range, the patient's hemoglobin concentration is at 8.5 g/dL. The patient remains on Symbicort, Zyrtec, Mucinex, DuoNeb, Synthroid, Mycostatin, Zosyn.  ENT seen the patient and also GI for possible PEG tube placement patient is scheduled for modified barium swallow on Monday, 2D echo pending to see the right heart pressure CAT scan did not show any right heart strain    7/22 the patient is currently receiving 10 L of oxygen through her tracheostomy.  Patient had a echocardiogram done yesterday which shows  Left Ventricle: Normal left ventricular systolic function with a visually estimated EF of 60 - 65%. Left ventricle size is normal. Normal wall thickness. Normal wall motion. Normal diastolic function. Pericardium: Trivial pericardial effusion present. The Right ventricle size is normal. Normal systolic function, TAPSE is 2.2cm. RV peak S is 18cm/s. Right atrium is normal size. No indication of cardiac tamponade. Image quality is technically difficult. Procedure performed with the patient in a sitting position.  The patient is currently on  Symbicort, Zyrtec, Mucinex, DuoNeb, Synthroid, Mycostatin, Zosyn.  Discontinue heparin start patient on Eliquis    7/23 the patient is currently on room air.  The patient is going to be having a PEG tube placed today.  The patient is currently on Symbicort, Zyrtec, Mucinex, DuoNeb, Synthroid, Mycostatin, Zosyn.     7/24 the patient is currently on room air.  The patient has a low potassium.  The patient's white blood cell count is within normal range, however her hemoglobin is decreased to 8.6 g/dL.  The patient also reports that she

## 2024-07-26 VITALS
TEMPERATURE: 98 F | DIASTOLIC BLOOD PRESSURE: 77 MMHG | WEIGHT: 200 LBS | SYSTOLIC BLOOD PRESSURE: 135 MMHG | RESPIRATION RATE: 16 BRPM | BODY MASS INDEX: 35.44 KG/M2 | HEART RATE: 85 BPM | HEIGHT: 63 IN | OXYGEN SATURATION: 97 %

## 2024-07-26 PROBLEM — D64.9 ANEMIA: Status: ACTIVE | Noted: 2024-07-26

## 2024-07-26 PROBLEM — I82.411 ACUTE DEEP VEIN THROMBOSIS (DVT) OF FEMORAL VEIN OF RIGHT LOWER EXTREMITY (HCC): Status: ACTIVE | Noted: 2024-07-26

## 2024-07-26 PROBLEM — I10 HTN (HYPERTENSION): Status: ACTIVE | Noted: 2024-07-26

## 2024-07-26 PROBLEM — I26.99 BILATERAL PULMONARY EMBOLISM (HCC): Status: ACTIVE | Noted: 2024-07-26

## 2024-07-26 PROBLEM — N17.9 AKI (ACUTE KIDNEY INJURY) (HCC): Status: ACTIVE | Noted: 2024-07-26

## 2024-07-26 PROBLEM — R91.1 NODULE OF UPPER LOBE OF RIGHT LUNG: Status: ACTIVE | Noted: 2024-07-26

## 2024-07-26 PROBLEM — E87.6 HYPOKALEMIA: Status: ACTIVE | Noted: 2024-07-26

## 2024-07-26 PROBLEM — R13.10 DYSPHAGIA: Status: ACTIVE | Noted: 2024-07-26

## 2024-07-26 PROBLEM — N17.9 AKI (ACUTE KIDNEY INJURY) (HCC): Status: RESOLVED | Noted: 2024-07-26 | Resolved: 2024-07-26

## 2024-07-26 LAB
ANION GAP SERPL CALC-SCNC: 5 MMOL/L (ref 5–15)
BACTERIA SPEC CULT: NORMAL
BACTERIA SPEC CULT: NORMAL
BASOPHILS # BLD: 0.1 K/UL (ref 0–0.1)
BASOPHILS NFR BLD: 1 % (ref 0–1)
BUN SERPL-MCNC: 10 MG/DL (ref 6–20)
BUN/CREAT SERPL: 10 (ref 12–20)
CA-I BLD-MCNC: 8.5 MG/DL (ref 8.5–10.1)
CHLORIDE SERPL-SCNC: 109 MMOL/L (ref 97–108)
CO2 SERPL-SCNC: 25 MMOL/L (ref 21–32)
CREAT SERPL-MCNC: 0.98 MG/DL (ref 0.55–1.02)
DIFFERENTIAL METHOD BLD: ABNORMAL
EOSINOPHIL # BLD: 0.2 K/UL (ref 0–0.4)
EOSINOPHIL NFR BLD: 2 % (ref 0–7)
ERYTHROCYTE [DISTWIDTH] IN BLOOD BY AUTOMATED COUNT: 16.8 % (ref 11.5–14.5)
GLUCOSE SERPL-MCNC: 120 MG/DL (ref 65–100)
HCT VFR BLD AUTO: 27.8 % (ref 35–47)
HGB BLD-MCNC: 8.9 G/DL (ref 11.5–16)
IMM GRANULOCYTES # BLD AUTO: 0 K/UL
IMM GRANULOCYTES NFR BLD AUTO: 0 %
LYMPHOCYTES # BLD: 1.2 K/UL (ref 0.8–3.5)
LYMPHOCYTES NFR BLD: 13 % (ref 12–49)
Lab: NORMAL
Lab: NORMAL
MCH RBC QN AUTO: 30.2 PG (ref 26–34)
MCHC RBC AUTO-ENTMCNC: 32 G/DL (ref 30–36.5)
MCV RBC AUTO: 94.2 FL (ref 80–99)
METAMYELOCYTES NFR BLD MANUAL: 4 %
MONOCYTES # BLD: 0.4 K/UL (ref 0–1)
MONOCYTES NFR BLD: 4 % (ref 5–13)
MYELOCYTES NFR BLD MANUAL: 2 %
NEUTS BAND NFR BLD MANUAL: 7 % (ref 0–6)
NEUTS SEG # BLD: 6.6 K/UL (ref 1.8–8)
NEUTS SEG NFR BLD: 67 % (ref 32–75)
NRBC # BLD: 0.22 K/UL (ref 0–0.01)
NRBC BLD-RTO: 2.5 PER 100 WBC
PLATELET # BLD AUTO: 283 K/UL (ref 150–400)
PMV BLD AUTO: 10 FL (ref 8.9–12.9)
POTASSIUM SERPL-SCNC: 3.6 MMOL/L (ref 3.5–5.1)
RBC # BLD AUTO: 2.95 M/UL (ref 3.8–5.2)
RBC MORPH BLD: ABNORMAL
SODIUM SERPL-SCNC: 139 MMOL/L (ref 136–145)
WBC # BLD AUTO: 8.9 K/UL (ref 3.6–11)

## 2024-07-26 PROCEDURE — 2580000003 HC RX 258: Performed by: INTERNAL MEDICINE

## 2024-07-26 PROCEDURE — 94761 N-INVAS EAR/PLS OXIMETRY MLT: CPT

## 2024-07-26 PROCEDURE — 6370000000 HC RX 637 (ALT 250 FOR IP): Performed by: EMERGENCY MEDICINE

## 2024-07-26 PROCEDURE — 6370000000 HC RX 637 (ALT 250 FOR IP): Performed by: PHYSICIAN ASSISTANT

## 2024-07-26 PROCEDURE — 80048 BASIC METABOLIC PNL TOTAL CA: CPT

## 2024-07-26 PROCEDURE — 94640 AIRWAY INHALATION TREATMENT: CPT

## 2024-07-26 PROCEDURE — 92507 TX SP LANG VOICE COMM INDIV: CPT

## 2024-07-26 PROCEDURE — 6370000000 HC RX 637 (ALT 250 FOR IP): Performed by: INTERNAL MEDICINE

## 2024-07-26 PROCEDURE — 36415 COLL VENOUS BLD VENIPUNCTURE: CPT

## 2024-07-26 PROCEDURE — 2700000000 HC OXYGEN THERAPY PER DAY

## 2024-07-26 PROCEDURE — 6360000002 HC RX W HCPCS: Performed by: INTERNAL MEDICINE

## 2024-07-26 PROCEDURE — 92526 ORAL FUNCTION THERAPY: CPT

## 2024-07-26 PROCEDURE — 94669 MECHANICAL CHEST WALL OSCILL: CPT

## 2024-07-26 PROCEDURE — 85025 COMPLETE CBC W/AUTO DIFF WBC: CPT

## 2024-07-26 PROCEDURE — 92523 SPEECH SOUND LANG COMPREHEN: CPT

## 2024-07-26 RX ORDER — AMOXICILLIN AND CLAVULANATE POTASSIUM 500; 125 MG/1; MG/1
1 TABLET, FILM COATED ORAL EVERY 8 HOURS SCHEDULED
Status: DISCONTINUED | OUTPATIENT
Start: 2024-07-26 | End: 2024-07-26 | Stop reason: HOSPADM

## 2024-07-26 RX ORDER — SCOLOPAMINE TRANSDERMAL SYSTEM 1 MG/1
1 PATCH, EXTENDED RELEASE TRANSDERMAL
Qty: 3 PATCH | Refills: 0 | Status: SHIPPED | OUTPATIENT
Start: 2024-07-28 | End: 2024-08-04

## 2024-07-26 RX ORDER — DEXTROMETHORPHAN POLISTIREX 30 MG/5ML
60 SUSPENSION ORAL EVERY 12 HOURS SCHEDULED
Qty: 200 ML | Refills: 0 | Status: SHIPPED | OUTPATIENT
Start: 2024-07-26 | End: 2024-08-05

## 2024-07-26 RX ORDER — LOPERAMIDE HYDROCHLORIDE 2 MG/1
2 CAPSULE ORAL 4 TIMES DAILY PRN
Qty: 12 CAPSULE | Refills: 0 | Status: SHIPPED | OUTPATIENT
Start: 2024-07-26 | End: 2024-07-29

## 2024-07-26 RX ORDER — AMOXICILLIN AND CLAVULANATE POTASSIUM 500; 125 MG/1; MG/1
1 TABLET, FILM COATED ORAL EVERY 8 HOURS SCHEDULED
Qty: 30 TABLET | Refills: 0 | Status: SHIPPED | OUTPATIENT
Start: 2024-07-26 | End: 2024-08-05

## 2024-07-26 RX ADMIN — LOPERAMIDE HYDROCHLORIDE 2 MG: 2 CAPSULE ORAL at 03:51

## 2024-07-26 RX ADMIN — CETIRIZINE HYDROCHLORIDE 10 MG: 10 TABLET, FILM COATED ORAL at 08:30

## 2024-07-26 RX ADMIN — APIXABAN 10 MG: 5 TABLET, FILM COATED ORAL at 08:30

## 2024-07-26 RX ADMIN — LOSARTAN POTASSIUM 25 MG: 50 TABLET, FILM COATED ORAL at 08:30

## 2024-07-26 RX ADMIN — Medication 2 PUFF: at 07:57

## 2024-07-26 RX ADMIN — PIPERACILLIN AND TAZOBACTAM 4500 MG: 4; .5 INJECTION, POWDER, LYOPHILIZED, FOR SOLUTION INTRAVENOUS at 02:51

## 2024-07-26 RX ADMIN — Medication 60 MG: at 08:30

## 2024-07-26 RX ADMIN — IPRATROPIUM BROMIDE AND ALBUTEROL SULFATE 1 DOSE: 2.5; .5 SOLUTION RESPIRATORY (INHALATION) at 14:24

## 2024-07-26 RX ADMIN — NYSTATIN 500000 UNITS: 100000 SUSPENSION ORAL at 08:30

## 2024-07-26 RX ADMIN — LEVOTHYROXINE SODIUM 150 MCG: 0.07 TABLET ORAL at 06:29

## 2024-07-26 RX ADMIN — IPRATROPIUM BROMIDE AND ALBUTEROL SULFATE 1 DOSE: 2.5; .5 SOLUTION RESPIRATORY (INHALATION) at 07:57

## 2024-07-26 RX ADMIN — AMOXICILLIN AND CLAVULANATE POTASSIUM 1 TABLET: 500; 125 TABLET, FILM COATED ORAL at 15:05

## 2024-07-26 RX ADMIN — SODIUM CHLORIDE, PRESERVATIVE FREE 5 ML: 5 INJECTION INTRAVENOUS at 08:31

## 2024-07-26 RX ADMIN — NYSTATIN 500000 UNITS: 100000 SUSPENSION ORAL at 15:05

## 2024-07-26 NOTE — PROGRESS NOTES
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   female in consultation     Excerpts from admission 2024 or consult notes as follows:     April Mayo is a 69-year-old female with a history of post ablative hypothyroidism, vocal cord mass, dysphonia, neoplasm of uncertain behavior of the vocal cord, tracheostomy, laryngeal cancer, and aspiration pneumonia, who presented to the emergency department yesterday for difficulty eating, drinking, and throat pain secondary to radiation treatment.  Per the patient's daughter, the patient also experienced right leg swelling, and she reported a odor from her tracheostomy.  The patient's daughter also reports that last week the patient was diagnosed with pneumonia and is experiencing an increase in mucus secretions from the trach as well.        Allergies   Allergen Reactions    Codeine Other (See Comments)     NAUSEA, VOMITING         MAR reviewed and pertinent medications noted or modified as needed     Current Facility-Administered Medications   Medication Dose Route Frequency Provider Last Rate Last Admin    apixaban (ELIQUIS) tablet 10 mg  10 mg Oral BID Darek Moscoso MD   10 mg at 24 0830    Followed by    [START ON 2024] apixaban (ELIQUIS) tablet 5 mg  5 mg Oral BID Darek Moscoso MD        loperamide (IMODIUM) capsule 2 mg  2 mg Oral 4x Daily PRN [employer]  nucleic acid assay. This test is a multiplex Real-Time Reverse Transcriptase Polymerase Chain Reaction (RT-PCR) based in vitro diagnostic test intended for the qualitative detection of nucleic acids from SARS-CoV-2, Influenza A, and Influenza B in nasopharyngeal and nasal swab specimens for use under the FDA's Emergency Use Authorization (EUA) only.   Fact sheet for Patients: https://www.fda.gov/media/448683/download Fact sheet   for Healthcare Providers: https://www.fda.fov/media/083249/download         COVID-19 & Influenza Combo [[id number]] Collected: 07/19/24 1830    Order Status: Canceled Specimen: Nasopharyngeal     Culture, Blood 2 [[id number]] Collected: 07/19/24 1826    Order Status: Completed Specimen: Blood Updated: 07/26/24 0847     Special Requests No Special Requests        Culture No growth 7 days       Culture, Blood 1 [[id number]]     Order Status: Canceled Specimen: Blood     COVID-19, Rapid [[id number]]     Order Status: Canceled Specimen: Nasopharyngeal Swab     Rapid influenza A/B antigens [[id number]]     Order Status: Canceled Specimen: Nasopharyngeal     Culture, Blood 1 [[id number]] Collected: 07/19/24 1321    Order Status: Completed Specimen: Blood Updated: 07/26/24 0847     Special Requests --        No Special Requests  Port       Culture No growth 7 days                Imaging:  CT ABDOMEN PELVIS W IV CONTRAST Additional Contrast? None    Result Date: 7/19/2024  EXAM:  CTA CHEST W WO CONTRAST, CT ABDOMEN PELVIS W IV CONTRAST INDICATION: Severe cough and shortness of breath with acute DVT COMPARISON: None. TECHNIQUE: Helical thin section chest CT following intravenous administration of nonionic contrast 100 mL of isovue 370 according to departmental PE protocol. Coronal and sagittal reformats were performed. 3D post processing was performed. Been, thin axial images were obtained through the abdomen and pelvis. Coronal and sagittal reconstructions were generated. CT dose reduction was  strain  The patient's sodium, chloride, BUN, creatinine, BUN/creatinine, are all elevated.  The patient's white blood cell count is within normal range, but his hemoglobin is decreased at 9.1 g/dL.  The patient's CT of the chest shows Moderate burden of central pulmonary emboli involving distal main, lobar, and segmental arteries of all 5 lobes, right greater than left. No evidence of right heart strain on CT. Right lower lobe pneumonia with adjacent loculated parapneumonic effusion versus empyema. A 1.5 cm metallic foreign body in the cecum could be a PillCam or other ingested foreign body. Bilateral common femoral and external iliac vein DVT. Indeterminate 6mm right upper lobe nodule.  CT of the abdomen and pelvis shows Moderate burden of central pulmonary emboli involving distal main, lobar, and segmental arteries of all 5 lobes, right greater than left. No evidence of right heart strain on CT. Right lower lobe pneumonia with adjacent loculated parapneumonic effusion versus empyema. A 1.5 cm metallic foreign body in the cecum could be a PillCam or other ingested foreign body. Bilateral common femoral and external iliac vein DVT. Indeterminate 6mm right upper lobe nodule.  A vascular duplex of the lower extremity shows Acute non-occlusive deep vein thrombosis in the right external iliac vein. Acute non-occlusive deep vein thrombosis in the right common femoral vein. Acute occlusive deep vein thrombosis in the right femoral vein. Acute occlusive deep vein thrombosis in the right popliteal vein. Acute deep vein thrombosis in the right gastrocnemius vein. Acute deep vein thrombosis in the right posterior tibial vein. Acute deep vein thrombosis in the right peroneal vein. Acute superficial vein thrombosis in the right small saphenous vein.  Chest x-ray done yesterday shows increased airspace disease in the right base.  Patient is receiving Symbicort, Zyrtec, Mucinex, Heparin, DuoNeb, Synthroid, Mycostatin, Morphine,

## 2024-07-26 NOTE — DISCHARGE SUMMARY
Discharge Summary    Name: April Mayo  [id number]  YOB: 1955 (Age: 69 y.o.)   Date of Admission: 7/19/2024  Date of Discharge: 7/26/2024  Attending Physician: Alexander Benitez MD    Discharge Diagnosis:   Principal Problem (Resolved):    Aspiration pneumonia of right lower lobe due to vomit (HCC)  Active Problems:    Tracheostomy care (HCC)    Laryngeal cancer (HCC)    Bilateral pulmonary embolism (HCC)    Acute deep vein thrombosis (DVT) of femoral vein of right lower extremity (HCC)    Dysphagia    HTN (hypertension)    Nodule of upper lobe of right lung    Hypokalemia    Anemia  Resolved Problems:    CHRISTEN (acute kidney injury) (HCC)       Consultations:  IP CONSULT TO PULMONOLOGY  IP CONSULT TO OTOLARYNGOLOGY  IP CONSULT TO ONCOLOGY  IP CONSULT TO GI  IP CONSULT TO DIETITIAN  IP CONSULT HOME HEALTH  IP WOUND CARE NURSE CONSULT TO EVAL    Brief Hospital Course by Main Problems:   69 y.o.  female with significant throat cancer, s/p trach to room air, hypertension, right chest IR port placement, ongoing radiation treatments is brought in by sister from home with progressive worsening weakness and decreased oral intake along with a significant cough for the last 1 week. Sister endorses right lower extremity swelling significantly more than left. In the ED, patient has mildly febrile with temperature of 99.0 however normotensive and mildly tachypneic with respiratory rate in the 20s and mildly tachycardic however not hypoxic.  Patient was admitted to the hospital for further evaluation and management. CT angiogram chest with significant bilateral PE, right-sided aspiration pneumonia.  Started on heparin GGT, IV zosyn. Placed on 10 L high flow via trach with worsening hypoxia noted.  SLP evaluated patient recommended MBS.  MBS completed with no significant aspiration, reflux, retention.  GI recommending PEG tube for nutrition intake, placed 7/23.  Dietitian consult  month(s)  For continued outpatient pulmonary care    Timoteo Mahajan MD  [address]  [address]  [facility] [id number] [phone]    Follow up in 2 week(s)  For continued outpatient care of laryngeal cancer    Vaishali Pringle MD  [address]  [address]  [facility] [address] [phone]    Follow up in 1 month(s)  For continued outpatient ENT care          Total time in minutes spent coordinating this discharge (includes going over instructions, follow-up, prescriptions, and preparing report for sign off to her PCP) :  35 minutes

## 2024-07-26 NOTE — CARE COORDINATION
Transition of Care Plan:    RUR: 18%  Prior Level of Functioning: Mod Indep  Disposition: HH  If SNF or IPR: Date FOC offered: NA  Date FOC received: NA  Accepting facility: [facility]  Date authorization started with reference number: NA  Date authorization received and expires: NA  Follow up appointments: PCP  DME needed: NONE  Transportation at discharge: FAMILY  IM/IMM Medicare/ letter given: YES  Is patient a  and connected with [facility]? NO   If yes, was  transfer form completed and [facility] notified?   Caregiver Contact: AUBREE MORALES  Discharge Caregiver contacted prior to discharge? YES  Care Conference needed? NO  Barriers to discharge: NONE    Pt to dc home today w/ [facility] KOURTNEY and [facility] for tube feeds. [facility] will be at bedside at 1445 for teaching and pt will dc home w/ family support.

## 2024-07-26 NOTE — PROGRESS NOTES
Nutrition Note    As requested by SLP, See bolus rec's below:     Nutrition Recommendations/Plan:   TF of Jevity 1.5 (Standard with Fiber) via PEG at goal rate of 72 mL/hr.  Flush 210 mL H2O q4hrs.  Provides: 2592 kcal (99%), 110 g protein (92%), 2575 mL H2O (99%)    Bolus Rec's as needed:   TF of Jevity 1.5 (Standard with Fiber) via PEG at goal of 290 mL bolus 6x/day.  Flush 100 mL H2o before AND after each bolus.  Provides: 2610 kcal (100%), 111 g protein (93 %), 2522 mL H2O (97 %)    Monitor TF tolerance/rate, s/s and BM in I/O's.      Electronically signed by Paloma Escamilla RD on 7/26/24 at 2:18 PM EDT    Contact: [phone] or [employer]

## 2024-07-26 NOTE — PROGRESS NOTES
Speech LAnguage Pathology Evaluation and dysphagia treatment    Patient: April Mayo (69 y.o. female)  Date: 7/26/2024  Primary Diagnosis: Aspiration pneumonia of right lower lobe due to vomit (HCC) [J69.0]  Procedure(s) (LRB):  ESOPHAGOGASTRODUODENOSCOPY PERCUTANEOUS ENDOSCOPIC GASTROSTOMY TUBE INSERTION (N/A) 3 Days Post-Op   Precautions:  NPO, Aspiration Risk, Fall Risk                    DIET RECOMMENDATIONS: Full liquid and thin liquids upgrade as tolerated    SWALLOW SAFETY PRECAUTIONS: Rec slow rate of intake, small bites/sips, effortful swallow, and remain upright 30 minutes after PO intake.       PMV RECOMMENDATIONS: PMV as tolerated    ASSESSMENT :  ST eval/tx:   Patient w/ shiely 6 cuffless trach on 21% FiO2 10L for humidification. Patient w/ thick tan secretions. Patient indicates frustration w/ sore skin behind trach. She endorses improvement in odynophagia. T-Piece removed and PMV donned to trach hub. Patient is aphonic however able to meet basic needs verbally. Patient able to rafael/doff PMV. Trach care performed w/ changing over inner cannula, neck strap and split gauze placed. Concerns for pressure sore behind face plat of trach w/ red broken skin noted. Wound care consult placed. Patient reports using aquaphor and vaseline on skin. Patient received education by RN and SLP on not to use those products. Patient able to recall how to suction, change inner cannula and perform trach care. She is currently receiving continuous TF via PEG. However, patient is active and mobile and would benefit from bolus feedings. Discussed w/ RD. Educated on voicing strategies and vocal hygiene.   Sw tx: Patient w/ improved tolerance of PO she reports she is still concerned about adequate intake and weight loss. Overall, oropharyngeal swallow is functional for consistencies administered and consistent w/ recent MBS. Patient to benefit from HH ST and initiate myofacial release and sw exercises once cleared by MD.

## 2024-07-26 NOTE — PROGRESS NOTES
Discharge paperwork complete, except for follow up appointments being done by . Print and give to patient when patient is ready to leave. Primary nurse aware.

## 2024-07-30 ENCOUNTER — TELEPHONE (OUTPATIENT)
Age: 69
End: 2024-07-30

## 2024-08-08 LAB
HBA1C MFR BLD HPLC: 6.4 %
LDL CHOLESTEROL, EXTERNAL: 126

## 2024-08-10 NOTE — PROGRESS NOTES
Physician Progress Note      PATIENT:               YOVANNY MORALES  [facility] #:                  [id number]  :                       1955  ADMIT DATE:       2024 11:36 AM  DISCH DATE:        2024 4:20 PM  RESPONDING  PROVIDER #:        Ermias Cordon PA-C          QUERY TEXT:    Patient admitted with aspiration pneumonia. Documentation reflects sepsis in    note and not in discharge summary.  If possible, please document in the   progress notes and discharge summary if sepsis was:    The medical record reflects the following:  Risk Factors: 70 yo female with aspiration pneumonia, acute respiratory   failure, bilateral PE  Clinical Indicators:  Bands of 6   CRP 14.8  Procalcitonin 0.09   BP 91/52       Treatment: Zosyn    Thank you,  Sara Hayes RN, CCDS  Options provided:  -- Sepsis confirmed after study  -- Sepsis ruled out after study  -- Other - I will add my own diagnosis  -- Disagree - Not applicable / Not valid  -- Disagree - Clinically unable to determine / Unknown  -- Refer to Clinical Documentation Reviewer    PROVIDER RESPONSE TEXT:    Sepsis confirmed after study.    Query created by: Sara Hayes on 2024 2:52 PM      Electronically signed by:  Ermias Cordon PA-C 8/10/2024 2:31 PM

## 2024-08-15 ENCOUNTER — OFFICE VISIT (OUTPATIENT)
Age: 69
End: 2024-08-15

## 2024-08-15 VITALS
DIASTOLIC BLOOD PRESSURE: 72 MMHG | HEART RATE: 83 BPM | TEMPERATURE: 97.6 F | WEIGHT: 224.6 LBS | OXYGEN SATURATION: 98 % | HEIGHT: 63 IN | SYSTOLIC BLOOD PRESSURE: 133 MMHG | BODY MASS INDEX: 39.8 KG/M2

## 2024-08-15 DIAGNOSIS — E03.9 PRIMARY HYPOTHYROIDISM: Primary | ICD-10-CM

## 2024-08-15 DIAGNOSIS — E89.0 POSTABLATIVE HYPOTHYROIDISM: Primary | ICD-10-CM

## 2024-08-15 DIAGNOSIS — E55.9 VITAMIN D DEFICIENCY, UNSPECIFIED: ICD-10-CM

## 2024-08-15 RX ORDER — LEVOTHYROXINE SODIUM 0.15 MG/1
150 TABLET ORAL DAILY
COMMUNITY
End: 2024-08-15 | Stop reason: SDUPTHER

## 2024-08-15 RX ORDER — LEVOTHYROXINE SODIUM 150 UG/1
150 TABLET ORAL DAILY
Qty: 90 TABLET | Refills: 3 | Status: SHIPPED | OUTPATIENT
Start: 2024-08-15 | End: 2024-08-15 | Stop reason: ALTCHOICE

## 2024-08-15 RX ORDER — LEVOTHYROXINE SODIUM 150 MCG
150 TABLET ORAL DAILY
Qty: 90 TABLET | Refills: 3 | Status: SHIPPED | OUTPATIENT
Start: 2024-08-15

## 2024-08-15 NOTE — TELEPHONE ENCOUNTER
Pharmacy is asking if the brand unithroid can be switched to branded synthroid due to medication availability

## 2024-08-15 NOTE — PROGRESS NOTES
[facility] DIABETES AND ENDOCRINOLOGY                 Yudith Gomez MD             Referred by: Lyly Bowman MD         Chief Complaint   Patient presents with    Thyroid Problem           History of present illness      April Mayo is   here for follow up of thyroid.   She was diagnosed with Graves' disease status post radioactive iodine therapy several years ago followed by post ablative hypothyroidism  Laryngeal carcinoma, chemo, XRT, was in the hospital with DVT, PE     She was on Synthroid and due to the insurance coverage switched to levothyroxine  Later on switched to Unithroid     Taking Unithroid brand consistently   No diarrhea   No tachycardia    No shakiness/nervousness   No lump in throat/neck   Some acid reflux            Physical Examination:      General: pleasant, no distress, good eye contact    HEENT: no exopthalmos, no periorbital edema, no scleral/conjunctival injection, EOMI, no lid lag or stare    Neck: supple, no thyromegaly, tracheostomy    Cardiovascular: regular, normal rate, normal S1 and S2,    Respiratory: clear to auscultation bilaterally        Musculoskeletal: no proximal muscle weakness in upper or lower extremities    Integumentary: no tremors, no edema,    Neurological:alert and oriented     Psychiatric: normal mood and affect      Data Reviewed:       [x] Reviewed labs          Lab Results   Component Value Date    TSH 0.177 (L) 02/07/2024    T4FREE 1.59 02/07/2024       No results found for: \"TRAB\", \"TSI\", \"TSILT\", \"TPO\"      Assessment/Plan:          Primary hypothyroidism/post ablative hypothyroidism   History of Graves' disease   Unithroid, 150 mcg, Monday through Saturday, none on Sunday          Thyroid function test widely fluctuates, discussed instructions, pharmacy is also switching medications from brand to generic, now getting brand Unithroid    No hyperadrenergic symptoms        Vitamin D deficiency: Supplements/dairy intake

## 2024-08-16 ENCOUNTER — HOSPITAL ENCOUNTER (OUTPATIENT)
Facility: HOSPITAL | Age: 69
End: 2024-08-16
Attending: RADIOLOGY
Payer: MEDICARE

## 2024-08-16 VITALS
SYSTOLIC BLOOD PRESSURE: 133 MMHG | HEART RATE: 69 BPM | BODY MASS INDEX: 39.8 KG/M2 | DIASTOLIC BLOOD PRESSURE: 77 MMHG | RESPIRATION RATE: 22 BRPM | TEMPERATURE: 98.7 F | WEIGHT: 224.7 LBS | OXYGEN SATURATION: 100 %

## 2024-08-16 DIAGNOSIS — C32.1 CANCER OF SUPRAGLOTTIS (HCC): Primary | ICD-10-CM

## 2024-08-16 PROCEDURE — 99213 OFFICE O/P EST LOW 20 MIN: CPT

## 2024-08-16 NOTE — PROGRESS NOTES
[facility]    Radiation Oncology Follow Up    April Mayo  [id number]  1955     Diagnosis   Squamous cell ca , supraglottic larynx. T3N0M0    AJCC Staging has been reviewed.  Oncologic History   Definitive chemo/xrt.     Interval History   Ms. Mayo arrives today for routine follow up 1 month from end of treatment.  Continues to improve. Taking all nutrition by mouth. Does not use PEG tube.  No H+N symptoms. No n/v.  Trach in place      Review of Systems:  A complete review of systems was obtained and negative except as described above.    Interval Imaging/Labs     Above imaging/lab reports were reviewed.  Allergies and Medications     Allergies   Allergen Reactions    Codeine Other (See Comments)     NAUSEA, VOMITING        Current Outpatient Medications   Medication Instructions    acetaminophen-codeine 120-12 MG/5ML solution 5 mLs, Oral, EVERY 6 HOURS PRN    albuterol (PROVENTIL) 2.5 mg, Nebulization, EVERY 6 HOURS PRN    albuterol sulfate HFA (PROVENTIL;VENTOLIN;PROAIR) 108 (90 Base) MCG/ACT inhaler 2 puffs, Inhalation, EVERY 6 HOURS PRN    apixaban (ELIQUIS) 10 mg, Oral, 2 TIMES DAILY    apixaban (ELIQUIS) 5 mg, Oral, 2 TIMES DAILY    Benzocaine-Menthol (CEPACOL) 6-10 MG LOZG lozenge 1 lozenge, Oral, EVERY 2 HOURS PRN    benzonatate (TESSALON) 100 mg, Oral, 3 TIMES DAILY PRN    budesonide-formoterol (SYMBICORT) 160-4.5 MCG/ACT AERO 2 puffs, Inhalation, 2 TIMES DAILY RESP    levocetirizine (XYZAL) 5 mg, Oral, DAILY    losartan (COZAAR) 50 mg, Oral, DAILY    Synthroid 150 mcg, Oral, DAILY        Physical Exam:   Vitals: Blood pressure 133/77, pulse 69, temperature 98.7 °F (37.1 °C), resp. rate 22, weight 101.9 kg (224 lb 11.2 oz), SpO2 100%.  Weight up 20 pounds.   Performance Status: ECOG 1, No physically strenuous activity, but ambulatory and able to carry out light or sedentary work (eg office work, light house work)  Constitutional: Pleasant, sitting

## 2024-08-27 ENCOUNTER — HOSPITAL ENCOUNTER (OUTPATIENT)
Facility: HOSPITAL | Age: 69
Discharge: HOME | End: 2024-08-30
Attending: RADIOLOGY
Payer: MEDICARE

## 2024-08-27 DIAGNOSIS — C32.1 CANCER OF SUPRAGLOTTIS (HCC): ICD-10-CM

## 2024-08-27 PROCEDURE — 70490 CT SOFT TISSUE NECK W/O DYE: CPT

## 2024-08-27 RX ORDER — IOPAMIDOL 755 MG/ML
100 INJECTION, SOLUTION INTRAVASCULAR
Status: DISCONTINUED | OUTPATIENT
Start: 2024-08-27 | End: 2024-08-31 | Stop reason: HOSPADM

## 2024-09-16 ENCOUNTER — TELEPHONE (OUTPATIENT)
Age: 69
End: 2024-09-16

## 2024-09-16 NOTE — TELEPHONE ENCOUNTER
Please see the refill encounter,  [facility] pharmacy said they do not have Unithroid and Synthroid was available and hence we had to switch it  Let patient know that the prescription was sent to the pharmacy    Synthroid or Unithroid 1 tablet Monday through Saturday, none on Sunday which ever is available or cheaper

## 2024-09-16 NOTE — TELEPHONE ENCOUNTER
Nothing received from the pharmacy. Unithroid and Synthroid were sent to [facility] on 8/15/24 - nothing to [facility].

## 2024-09-16 NOTE — TELEPHONE ENCOUNTER
Hi,    Caller says, pt's pharmacy will not fill pt's Levothyroxine because pharmacist has not received a response back from Provider on a refill of rx, nor clarification on whether or not to fill Levothyroxine/Unithroid.   Please return call to advise.     [facility] [address]     Thank you.

## 2024-09-17 NOTE — TELEPHONE ENCOUNTER
Attempted to call. Unsuccessful. Left msg for April Mayo to give us a call back at the office. A callback number was left.    RX was sent to [facility] pharmacy. Does it need to be sent to [facility]?    Also, do not see Marlin Maoy on any HIPAA/Pt Communication form. Please make a note to have pt fill out form at next visit

## 2024-09-19 NOTE — TELEPHONE ENCOUNTER
Daughter is on HIPAA form signed 2/14/24. Advised her Synthroid was sent to [facility] on 8/15/24. She stated pt told her she can't take Synthroid as it's generic. Advised her Synthroid and Unithroid are brand, it's levothyrxine that's generic that she shouldn't be taking. She verbalized understanding with no further questions or concerns at this time.

## 2024-09-26 ENCOUNTER — HOSPITAL ENCOUNTER (OUTPATIENT)
Facility: HOSPITAL | Age: 69
Discharge: HOME | End: 2024-09-29
Attending: INTERNAL MEDICINE
Payer: MEDICARE

## 2024-09-26 DIAGNOSIS — M84.9 DISORDER OF CONTINUITY OF BONE: ICD-10-CM

## 2024-09-26 DIAGNOSIS — C32.8 MALIGNANT NEOPLASM OVERLAPPING LARYNX SITE (HCC): ICD-10-CM

## 2024-09-26 PROCEDURE — A9503 TC99M MEDRONATE: HCPCS | Performed by: INTERNAL MEDICINE

## 2024-09-26 PROCEDURE — 3430000000 HC RX DIAGNOSTIC RADIOPHARMACEUTICAL: Performed by: INTERNAL MEDICINE

## 2024-09-26 PROCEDURE — 78306 BONE IMAGING WHOLE BODY: CPT | Performed by: INTERNAL MEDICINE

## 2024-09-26 RX ORDER — TC 99M MEDRONATE 20 MG/10ML
24.1 INJECTION, POWDER, LYOPHILIZED, FOR SOLUTION INTRAVENOUS
Status: COMPLETED | OUTPATIENT
Start: 2024-09-26 | End: 2024-09-26

## 2024-09-26 RX ADMIN — TC 99M MEDRONATE 24.1 MILLICURIE: 20 INJECTION, POWDER, LYOPHILIZED, FOR SOLUTION INTRAVENOUS at 09:20

## 2024-10-15 ENCOUNTER — TRANSCRIBE ORDERS (OUTPATIENT)
Facility: HOSPITAL | Age: 69
End: 2024-10-15

## 2024-10-15 DIAGNOSIS — Z12.31 OTHER SCREENING MAMMOGRAM: Primary | ICD-10-CM

## 2024-10-19 ENCOUNTER — HOSPITAL ENCOUNTER (OUTPATIENT)
Facility: HOSPITAL | Age: 69
Discharge: HOME | End: 2024-10-22
Attending: INTERNAL MEDICINE
Payer: MEDICARE

## 2024-10-19 DIAGNOSIS — I82.401 ACUTE EMBOLISM AND THROMBOSIS OF DEEP VEIN OF RIGHT LOWER EXTREMITY (HCC): ICD-10-CM

## 2024-10-19 DIAGNOSIS — C32.8 MALIGNANT NEOPLASM OVERLAPPING LARYNX SITE (HCC): ICD-10-CM

## 2024-10-19 DIAGNOSIS — I26.99 PULMONARY INFARCTION (HCC): ICD-10-CM

## 2024-10-19 DIAGNOSIS — M84.9 DISORDER OF CONTINUITY OF BONE: ICD-10-CM

## 2024-10-19 PROCEDURE — 3430000000 HC RX DIAGNOSTIC RADIOPHARMACEUTICAL: Performed by: INTERNAL MEDICINE

## 2024-10-19 PROCEDURE — A9609 HC RX DIAGNOSTIC RADIOPHARMACEUTICAL: HCPCS | Performed by: INTERNAL MEDICINE

## 2024-10-19 PROCEDURE — 78815 PET IMAGE W/CT SKULL-THIGH: CPT

## 2024-10-19 RX ORDER — FLUDEOXYGLUCOSE F-18 500 MCI/ML
10 INJECTION INTRAVENOUS
Status: COMPLETED | OUTPATIENT
Start: 2024-10-19 | End: 2024-10-19

## 2024-10-19 RX ADMIN — FLUDEOXYGLUCOSE F-18 12 MILLICURIE: 500 INJECTION INTRAVENOUS at 12:52

## 2024-11-06 ENCOUNTER — TELEPHONE (OUTPATIENT)
Age: 69
End: 2024-11-06

## 2024-11-06 NOTE — TELEPHONE ENCOUNTER
LM for patient to call back to make an appt possibly for this week. Office number and direct number left on [place].

## 2024-11-15 ENCOUNTER — HOSPITAL ENCOUNTER (OUTPATIENT)
Facility: HOSPITAL | Age: 69
End: 2024-11-15
Attending: RADIOLOGY
Payer: MEDICARE

## 2024-11-15 ENCOUNTER — APPOINTMENT (OUTPATIENT)
Facility: HOSPITAL | Age: 69
End: 2024-11-15
Attending: RADIOLOGY
Payer: MEDICARE

## 2024-11-15 VITALS
BODY MASS INDEX: 42.87 KG/M2 | OXYGEN SATURATION: 100 % | DIASTOLIC BLOOD PRESSURE: 74 MMHG | SYSTOLIC BLOOD PRESSURE: 138 MMHG | RESPIRATION RATE: 20 BRPM | TEMPERATURE: 98.7 F | HEART RATE: 58 BPM | WEIGHT: 242 LBS

## 2024-11-15 DIAGNOSIS — C32.1 CANCER OF SUPRAGLOTTIS (HCC): ICD-10-CM

## 2024-11-15 DIAGNOSIS — C32.8 CANCER OF OVERLAPPING SITES OF LARYNX (HCC): Primary | ICD-10-CM

## 2024-11-15 PROCEDURE — 99213 OFFICE O/P EST LOW 20 MIN: CPT

## 2024-11-15 ASSESSMENT — PAIN SCALES - GENERAL: PAINLEVEL_OUTOF10: 0

## 2024-11-15 NOTE — PROGRESS NOTES
[facility]    Radiation Oncology Follow Up    April Mayo  [id number]  1955     Diagnosis   Squamous cell ca , supraglottic larynx. T3N0M0    AJCC Staging has been reviewed.  Oncologic History   Definitive chemo/xrt.     Interval History   Ms. Mayo arrives today for routine follow up 4 months from end of treatment.  Continues to improve. Taking all nutrition by mouth and gaining weight.  No H+N symptoms. No n/v.  Trach still in place  PET CT from October shows no evidence of residual or recurrent disease.  PET CT 10/19/24  FINDINGS:     HEAD/NECK: Asymmetric increased left vocal cord activity potentially related to  limited motion of the right vocal cord. Mild uptake is seen around the  tracheostomy tube.     CHEST: No foci of abnormal hypermetabolism.     ABDOMEN/PELVIS: No foci of abnormal hypermetabolism.     SKELETON: No foci of abnormal hypermetabolism in the axial and visualized  appendicular skeleton.     IMPRESSION:  No PET avid evidence to suggest recurrent or metastatic disease.    Patient reports residual dry mouth.  Denies dysphagia, odynophagia, skin irritation.    Review of Systems:  A complete review of systems was obtained and negative except as described above.    Interval Imaging/Labs     Above imaging/lab reports were reviewed.  Allergies and Medications     Allergies   Allergen Reactions    Codeine Other (See Comments)     NAUSEA, VOMITING        Current Outpatient Medications   Medication Instructions    acetaminophen-codeine 120-12 MG/5ML solution 5 mLs, Oral, EVERY 6 HOURS PRN    albuterol (PROVENTIL) 2.5 mg, Nebulization, EVERY 6 HOURS PRN    albuterol sulfate HFA (PROVENTIL;VENTOLIN;PROAIR) 108 (90 Base) MCG/ACT inhaler 2 puffs, Inhalation, EVERY 6 HOURS PRN    apixaban (ELIQUIS) 10 mg, Oral, 2 TIMES DAILY    apixaban (ELIQUIS) 5 mg, Oral, 2 TIMES DAILY    Benzocaine-Menthol (CEPACOL) 6-10 MG LOZG lozenge 1 lozenge, Oral, EVERY 2 HOURS

## 2024-11-27 ENCOUNTER — OFFICE VISIT (OUTPATIENT)
Age: 69
End: 2024-11-27
Payer: MEDICARE

## 2024-11-27 VITALS
DIASTOLIC BLOOD PRESSURE: 88 MMHG | WEIGHT: 242 LBS | OXYGEN SATURATION: 99 % | BODY MASS INDEX: 42.87 KG/M2 | SYSTOLIC BLOOD PRESSURE: 142 MMHG | HEART RATE: 79 BPM

## 2024-11-27 DIAGNOSIS — Z43.0 TRACHEOSTOMY CARE (HCC): ICD-10-CM

## 2024-11-27 DIAGNOSIS — C32.9 LARYNGEAL CARCINOMA (HCC): Primary | ICD-10-CM

## 2024-11-27 DIAGNOSIS — R49.0 DYSPHONIA: ICD-10-CM

## 2024-11-27 PROCEDURE — 3079F DIAST BP 80-89 MM HG: CPT | Performed by: STUDENT IN AN ORGANIZED HEALTH CARE EDUCATION/TRAINING PROGRAM

## 2024-11-27 PROCEDURE — 3077F SYST BP >= 140 MM HG: CPT | Performed by: STUDENT IN AN ORGANIZED HEALTH CARE EDUCATION/TRAINING PROGRAM

## 2024-11-27 PROCEDURE — 1126F AMNT PAIN NOTED NONE PRSNT: CPT | Performed by: STUDENT IN AN ORGANIZED HEALTH CARE EDUCATION/TRAINING PROGRAM

## 2024-11-27 PROCEDURE — 31575 DIAGNOSTIC LARYNGOSCOPY: CPT | Performed by: STUDENT IN AN ORGANIZED HEALTH CARE EDUCATION/TRAINING PROGRAM

## 2024-11-27 PROCEDURE — 1159F MED LIST DOCD IN RCRD: CPT | Performed by: STUDENT IN AN ORGANIZED HEALTH CARE EDUCATION/TRAINING PROGRAM

## 2024-11-27 PROCEDURE — 31615 TRCHEOBRNCHSC EST TRACHS INC: CPT | Performed by: STUDENT IN AN ORGANIZED HEALTH CARE EDUCATION/TRAINING PROGRAM

## 2024-11-27 PROCEDURE — 99215 OFFICE O/P EST HI 40 MIN: CPT | Performed by: STUDENT IN AN ORGANIZED HEALTH CARE EDUCATION/TRAINING PROGRAM

## 2024-11-27 PROCEDURE — 1123F ACP DISCUSS/DSCN MKR DOCD: CPT | Performed by: STUDENT IN AN ORGANIZED HEALTH CARE EDUCATION/TRAINING PROGRAM

## 2024-11-27 NOTE — PROGRESS NOTES
Subjective:   April Mayo   69 y.o.   1955     Refered by: No referring provider defined for this encounter.     Consult Note:   Chief Compliant: Dysphonia     History of Present Illness:  April Mayo is a 69 y.o. female with past medical history of arthritis, graves disease s/p ablation, HTN, who was referred from Dr. Darek Moscoso for evaluation of dysphonia.     Patient was diagnosed with COPD 6 months ago and was started on an inhaler.  Since then patient has noted worsening dysphonia.  Denies any dysphagia or stridor.  Endorses shortness of breath.  Patient is a former smoker, less than 1 pack a day, but has been smoking for a long time.  Recently quit.    Additionally patient is concerned about her obstructive sleep apnea.  Had a home sleep test done, but it was inconclusive.      Patient follows with Dr. Gomez for Graves' disease status post ablation.  TSH is still suppressed but T4 is within normal limits    Interval Hx:   3/14 -23/24:   Patient was admitted for awake trach and biopsy, pathology was consistent with squamous cell carcinoma    4/18/24:   Patient was discharged and doing well with trach management herself.  No airway obstructive issues.  Patient is complaining of significant coughing.  Minimal secretions with cough.  Patient has a known history of allergies, feels that the pollen may be contributing to her coughing.    11/27/24:   Patient completed concurrent chemoradiation in July.  Post treatment PET shows interval resolution of laryngeal tumor.  Patient was having significant dysphagia after treatment.  Is now pegged and taking most of her nutrition via feeding tube.  Was previously admitted for aspiration pneumonia as well as DVT and PE    Patient's medical and radiation oncology treatment notes reviewed.  Recent admission notes reviewed.     Review of Systems  Consitutional: denies fever, excessive weight gain or loss.  Eyes: denies diplopia, eye pain.  Integumentary:

## 2025-02-17 ENCOUNTER — OFFICE VISIT (OUTPATIENT)
Age: 70
End: 2025-02-17

## 2025-02-17 VITALS
SYSTOLIC BLOOD PRESSURE: 132 MMHG | BODY MASS INDEX: 45.39 KG/M2 | WEIGHT: 256.2 LBS | OXYGEN SATURATION: 98 % | HEART RATE: 77 BPM | TEMPERATURE: 98.1 F | DIASTOLIC BLOOD PRESSURE: 60 MMHG | HEIGHT: 63 IN

## 2025-02-17 DIAGNOSIS — E89.0 POSTABLATIVE HYPOTHYROIDISM: Primary | ICD-10-CM

## 2025-02-17 DIAGNOSIS — E03.9 PRIMARY HYPOTHYROIDISM: ICD-10-CM

## 2025-02-17 DIAGNOSIS — E66.9 NON MORBID OBESITY: ICD-10-CM

## 2025-02-17 LAB
T4 FREE SERPL-MCNC: 1.3 NG/DL (ref 0.8–1.5)
TSH SERPL DL<=0.05 MIU/L-ACNC: 0.61 UIU/ML (ref 0.36–3.74)

## 2025-02-17 NOTE — PROGRESS NOTES
[facility] DIABETES AND ENDOCRINOLOGY                 Yudith Gomez MD             Referred by: Lyly Bowman MD      The patient (or guardian, if applicable) and other individuals in attendance with the patient were advised that Artificial Intelligence will be utilized during this visit to record, process the conversation to generate a clinical note, and support improvement of the AI technology. The patient (or guardian, if applicable) and other individuals in attendance at the appointment consented to the use of AI, including the recording.       Chief Complaint   Patient presents with    Thyroid Problem           History of present illness      April Mayo is   here for follow up of thyroid.   She was diagnosed with Graves' disease status post radioactive iodine therapy several years ago followed by post ablative hypothyroidism  Laryngeal carcinoma, chemo, XRT, was in the hospital with DVT, PE    She is on Synthroid, taking it consistently  Unithroid was not covered     No diarrhea   No tachycardia    No shakiness/nervousness   No lump in throat/neck   Some acid reflux            Physical Examination:      General: pleasant, no distress, good eye contact    HEENT: no exopthalmos, no periorbital edema, no scleral/conjunctival injection, EOMI, no lid lag or stare    Neck: supple, no thyromegaly, tracheostomy    Cardiovascular: regular, normal rate, normal S1 and S2,    Respiratory: clear to auscultation bilaterally        Musculoskeletal: no proximal muscle weakness in upper or lower extremities    Integumentary: no tremors, no edema,    Neurological:alert and oriented     Psychiatric: normal mood and affect      Data Reviewed:       [x] Reviewed labs          Lab Results   Component Value Date    TSH 0.177 (L) 02/07/2024    T4FREE 1.59 02/07/2024       No results found for: \"TRAB\", \"TSI\", \"TSILT\", \"TPO\"      Assessment/Plan:         1.  Primary hypothyroidism/post ablative

## 2025-02-17 NOTE — PROGRESS NOTES
April Mayo is a 69 y.o. female here for   Chief Complaint   Patient presents with    Thyroid Problem       1. Have you been to the ER, urgent care clinic since your last visit?  Hospitalized since your last visit? -No    2. Have you seen or consulted any other health care providers outside of the [facility] since your last visit?  Include any pap smears or colon screening.-No

## 2025-03-26 ENCOUNTER — OFFICE VISIT (OUTPATIENT)
Age: 70
End: 2025-03-26
Payer: MEDICARE

## 2025-03-26 VITALS
WEIGHT: 259 LBS | SYSTOLIC BLOOD PRESSURE: 138 MMHG | RESPIRATION RATE: 15 BRPM | DIASTOLIC BLOOD PRESSURE: 80 MMHG | BODY MASS INDEX: 45.88 KG/M2 | HEART RATE: 93 BPM | OXYGEN SATURATION: 95 %

## 2025-03-26 DIAGNOSIS — T66.XXXA COMPLICATION OF RADIOTHERAPY, INITIAL ENCOUNTER: ICD-10-CM

## 2025-03-26 DIAGNOSIS — Z43.0 TRACHEOSTOMY CARE: ICD-10-CM

## 2025-03-26 DIAGNOSIS — C32.9 LARYNGEAL CARCINOMA: Primary | ICD-10-CM
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
## 2025-03-26 PROCEDURE — 31615 TRCHEOBRNCHSC EST TRACHS INC: CPT | Performed by: STUDENT IN AN ORGANIZED HEALTH CARE EDUCATION/TRAINING PROGRAM

## 2025-03-26 PROCEDURE — 3075F SYST BP GE 130 - 139MM HG: CPT | Performed by: STUDENT IN AN ORGANIZED HEALTH CARE EDUCATION/TRAINING PROGRAM

## 2025-03-26 PROCEDURE — 1123F ACP DISCUSS/DSCN MKR DOCD: CPT | Performed by: STUDENT IN AN ORGANIZED HEALTH CARE EDUCATION/TRAINING PROGRAM

## 2025-03-26 PROCEDURE — 3079F DIAST BP 80-89 MM HG: CPT | Performed by: STUDENT IN AN ORGANIZED HEALTH CARE EDUCATION/TRAINING PROGRAM

## 2025-03-26 PROCEDURE — 31575 DIAGNOSTIC LARYNGOSCOPY: CPT | Performed by: STUDENT IN AN ORGANIZED HEALTH CARE EDUCATION/TRAINING PROGRAM

## 2025-03-26 PROCEDURE — 1126F AMNT PAIN NOTED NONE PRSNT: CPT | Performed by: STUDENT IN AN ORGANIZED HEALTH CARE EDUCATION/TRAINING PROGRAM

## 2025-03-26 PROCEDURE — 99214 OFFICE O/P EST MOD 30 MIN: CPT | Performed by: STUDENT IN AN ORGANIZED HEALTH CARE EDUCATION/TRAINING PROGRAM

## 2025-03-26 PROCEDURE — 1159F MED LIST DOCD IN RCRD: CPT | Performed by: STUDENT IN AN ORGANIZED HEALTH CARE EDUCATION/TRAINING PROGRAM

## 2025-03-26 RX ORDER — MUPIROCIN 20 MG/G
OINTMENT TOPICAL
Qty: 15 G | Refills: 1 | Status: SHIPPED | OUTPATIENT
Start: 2025-03-26 | End: 2025-04-02

## 2025-03-26 ASSESSMENT — PATIENT HEALTH QUESTIONNAIRE - PHQ9
SUM OF ALL RESPONSES TO PHQ QUESTIONS 1-9: 0
SUM OF ALL RESPONSES TO PHQ QUESTIONS 1-9: 0
2. FEELING DOWN, DEPRESSED OR HOPELESS: NOT AT ALL
SUM OF ALL RESPONSES TO PHQ QUESTIONS 1-9: 0
1. LITTLE INTEREST OR PLEASURE IN DOING THINGS: NOT AT ALL
SUM OF ALL RESPONSES TO PHQ QUESTIONS 1-9: 0

## 2025-03-26 NOTE — PROGRESS NOTES
Identified pt with two pt identifiers(name and ). Reviewed record in preparation for visit and have obtained necessary documentation. All patient medications has been reviewed.  No chief complaint on file.      Vitals:    25 0914   BP: 138/80   Pulse: 93   Resp: 15   SpO2: 95%                   Coordination of Care Questionnaire:   1) Have you been to an emergency room, urgent care, or hospitalized since your last visit?   no       2. Have seen or consulted any other health care provider since your last visit? no        Patient is accompanied by self I have received verbal consent from April Mayo to discuss any/all medical information while they are present in the room.

## 2025-03-26 NOTE — PROGRESS NOTES
Subjective:   April Mayo   69 y.o.   1955     Refered by: Lyly Bowman Md  [address] [place] [address]     Consult Note:   Chief Compliant: Dysphonia     History of Present Illness:  April Mayo is a 69 y.o. female with past medical history of arthritis, graves disease s/p ablation, HTN, who was referred from Dr. Darek Moscoso for evaluation of dysphonia.     Patient was diagnosed with COPD 6 months ago and was started on an inhaler.  Since then patient has noted worsening dysphonia.  Denies any dysphagia or stridor.  Endorses shortness of breath.  Patient is a former smoker, less than 1 pack a day, but has been smoking for a long time.  Recently quit.    Additionally patient is concerned about her obstructive sleep apnea.  Had a home sleep test done, but it was inconclusive.      Patient follows with Dr. Gomez for Graves' disease status post ablation.  TSH is still suppressed but T4 is within normal limits    Interval Hx:   3/14 -23/24:   Patient was admitted for awake trach and biopsy, pathology was consistent with squamous cell carcinoma    4/18/24:   Patient was discharged and doing well with trach management herself.  No airway obstructive issues.  Patient is complaining of significant coughing.  Minimal secretions with cough.  Patient has a known history of allergies, feels that the pollen may be contributing to her coughing.    11/27/24:   Patient completed concurrent chemoradiation in July.  Post treatment PET shows interval resolution of laryngeal tumor.  Patient was having significant dysphagia after treatment.  Is now pegged and taking most of her nutrition via feeding tube.  Was previously admitted for aspiration pneumonia as well as DVT and PE    Patient's medical and radiation oncology treatment notes reviewed.  Recent admission notes reviewed.     3/26/25:   Patient is stable from prior.  Voice not significantly changed.  Tolerating p.o. well.  No evidence of

## 2025-04-22 ENCOUNTER — HOSPITAL ENCOUNTER (OUTPATIENT)
Facility: HOSPITAL | Age: 70
Discharge: HOME | End: 2025-04-25
Attending: INTERNAL MEDICINE
Payer: MEDICARE

## 2025-04-22 DIAGNOSIS — C32.8 MALIGNANT NEOPLASM OVERLAPPING LARYNX SITE (HCC): ICD-10-CM

## 2025-04-22 PROCEDURE — 36590 REMOVAL TUNNELED CV CATH: CPT

## 2025-06-25 ENCOUNTER — ANESTHESIA EVENT (OUTPATIENT)
Facility: HOSPITAL | Age: 70
End: 2025-06-25
Payer: MEDICARE

## 2025-06-25 ENCOUNTER — HOSPITAL ENCOUNTER (OUTPATIENT)
Facility: HOSPITAL | Age: 70
Setting detail: OUTPATIENT SURGERY
Discharge: HOME | End: 2025-06-25
Attending: INTERNAL MEDICINE | Admitting: INTERNAL MEDICINE
Payer: MEDICARE

## 2025-06-25 ENCOUNTER — ANESTHESIA (OUTPATIENT)
Facility: HOSPITAL | Age: 70
End: 2025-06-25
Payer: MEDICARE

## 2025-06-25 VITALS
SYSTOLIC BLOOD PRESSURE: 105 MMHG | OXYGEN SATURATION: 100 % | DIASTOLIC BLOOD PRESSURE: 86 MMHG | HEART RATE: 88 BPM | RESPIRATION RATE: 23 BRPM | HEIGHT: 63 IN | WEIGHT: 258.38 LBS | TEMPERATURE: 98 F | BODY MASS INDEX: 45.78 KG/M2

## 2025-06-25 PROCEDURE — 3700000001 HC ADD 15 MINUTES (ANESTHESIA): Performed by: INTERNAL MEDICINE

## 2025-06-25 PROCEDURE — 7100000011 HC PHASE II RECOVERY - ADDTL 15 MIN: Performed by: INTERNAL MEDICINE

## 2025-06-25 PROCEDURE — 3700000000 HC ANESTHESIA ATTENDED CARE: Performed by: INTERNAL MEDICINE

## 2025-06-25 PROCEDURE — 6360000002 HC RX W HCPCS: Performed by: NURSE ANESTHETIST, CERTIFIED REGISTERED

## 2025-06-25 PROCEDURE — 7100000010 HC PHASE II RECOVERY - FIRST 15 MIN: Performed by: INTERNAL MEDICINE

## 2025-06-25 PROCEDURE — 3600007502: Performed by: INTERNAL MEDICINE

## 2025-06-25 PROCEDURE — 3600007512: Performed by: INTERNAL MEDICINE

## 2025-06-25 RX ORDER — SODIUM CHLORIDE 0.9 % (FLUSH) 0.9 %
5-40 SYRINGE (ML) INJECTION PRN
Status: DISCONTINUED | OUTPATIENT
Start: 2025-06-25 | End: 2025-06-25 | Stop reason: HOSPADM

## 2025-06-25 RX ORDER — SODIUM CHLORIDE 0.9 % (FLUSH) 0.9 %
5-40 SYRINGE (ML) INJECTION EVERY 12 HOURS SCHEDULED
Status: CANCELLED | OUTPATIENT
Start: 2025-06-25

## 2025-06-25 RX ORDER — PROPOFOL 10 MG/ML
INJECTION, EMULSION INTRAVENOUS
Status: DISCONTINUED | OUTPATIENT
Start: 2025-06-25 | End: 2025-06-25 | Stop reason: SDUPTHER

## 2025-06-25 RX ORDER — LIDOCAINE HYDROCHLORIDE 20 MG/ML
INJECTION, SOLUTION EPIDURAL; INFILTRATION; INTRACAUDAL; PERINEURAL
Status: DISCONTINUED | OUTPATIENT
Start: 2025-06-25 | End: 2025-06-25 | Stop reason: SDUPTHER

## 2025-06-25 RX ORDER — ONDANSETRON 4 MG/1
4 TABLET, ORALLY DISINTEGRATING ORAL EVERY 8 HOURS PRN
Status: CANCELLED | OUTPATIENT
Start: 2025-06-25

## 2025-06-25 RX ORDER — SODIUM CHLORIDE 9 MG/ML
INJECTION, SOLUTION INTRAVENOUS PRN
Status: CANCELLED | OUTPATIENT
Start: 2025-06-25

## 2025-06-25 RX ORDER — SODIUM CHLORIDE 9 MG/ML
25 INJECTION, SOLUTION INTRAVENOUS PRN
Status: DISCONTINUED | OUTPATIENT
Start: 2025-06-25 | End: 2025-06-25 | Stop reason: HOSPADM

## 2025-06-25 RX ORDER — ONDANSETRON 2 MG/ML
4 INJECTION INTRAMUSCULAR; INTRAVENOUS EVERY 6 HOURS PRN
Status: CANCELLED | OUTPATIENT
Start: 2025-06-25

## 2025-06-25 RX ORDER — SODIUM CHLORIDE 0.9 % (FLUSH) 0.9 %
5-40 SYRINGE (ML) INJECTION EVERY 12 HOURS SCHEDULED
Status: DISCONTINUED | OUTPATIENT
Start: 2025-06-25 | End: 2025-06-25 | Stop reason: HOSPADM

## 2025-06-25 RX ORDER — SODIUM CHLORIDE 0.9 % (FLUSH) 0.9 %
5-40 SYRINGE (ML) INJECTION PRN
Status: CANCELLED | OUTPATIENT
Start: 2025-06-25

## 2025-06-25 RX ADMIN — PROPOFOL 50 MG: 10 INJECTION, EMULSION INTRAVENOUS at 13:42

## 2025-06-25 RX ADMIN — LIDOCAINE HYDROCHLORIDE 100 MG: 20 INJECTION, SOLUTION EPIDURAL; INFILTRATION; INTRACAUDAL; PERINEURAL at 13:40

## 2025-06-25 RX ADMIN — PROPOFOL 100 MG: 10 INJECTION, EMULSION INTRAVENOUS at 13:40

## 2025-06-25 RX ADMIN — PROPOFOL 40 MG: 10 INJECTION, EMULSION INTRAVENOUS at 13:43

## 2025-06-25 RX ADMIN — PROPOFOL 120 MCG/KG/MIN: 10 INJECTION, EMULSION INTRAVENOUS at 13:41

## 2025-06-25 ASSESSMENT — PAIN SCALES - GENERAL
PAINLEVEL_OUTOF10: 0

## 2025-06-25 ASSESSMENT — PAIN - FUNCTIONAL ASSESSMENT: PAIN_FUNCTIONAL_ASSESSMENT: 0-10

## 2025-06-25 NOTE — H&P
.Pre-Endoscopy H&P Update  Chief complaint/HPI/ROS:  The indication for the procedure, the patient's history and the patient's current medications are reviewed prior to the procedure and that data is reported on the H&P to which this document is attached.  Any significant complaints with regard to organ systems will be noted, and if not mentioned then a review of systems is not contributory.  Past Medical History:   Diagnosis Date    Arthritis     Graves disease     Hypertension       Past Surgical History:   Procedure Laterality Date    ABDOMEN SURGERY  2024    Feeding tube placement    BUNIONECTOMY      HYSTERECTOMY (CERVIX STATUS UNKNOWN)      HYSTERECTOMY, VAGINAL      IR PORT PLACEMENT > 5 YEARS  2024    IR PORT PLACEMENT EQUAL OR GREATER THAN 5 YEARS 5/3/2024 Kanchan Gibbs PA [facility] RAD ANGIO IR    OTHER SURGICAL HISTORY      eye surgery for graves disease    TRACHEOSTOMY N/A 2024    AWAKE URGENT TRACHEOSTOMY AND BIOPSY (LOCAL WITH ZAIDA NIEVES) performed by Vaishali Pringle MD at [facility] MAIN OR    UPPER GASTROINTESTINAL ENDOSCOPY N/A 2024    ESOPHAGOGASTRODUODENOSCOPY PERCUTANEOUS ENDOSCOPIC GASTROSTOMY TUBE INSERTION performed by Geronimo Villeda MD at [facility] ENDOSCOPY     Social   Social History     Tobacco Use    Smoking status: Former     Current packs/day: 0.00     Types: Cigarettes     Quit date: 2023     Years since quittin.8    Smokeless tobacco: Never   Substance Use Topics    Alcohol use: Never      Family History   Problem Relation Age of Onset    Heart Failure Mother     Rheum Arthritis Sister     Stroke Sister     Thyroid Disease Sister       Allergies   Allergen Reactions    Codeine Other (See Comments)     NAUSEA, VOMITING       Prior to Admission Medications   Prescriptions Last Dose Informant Patient Reported? Taking?   Benzocaine-Menthol (CEPACOL) 6-10 MG LOZG lozenge   No No   Sig: Take 1 lozenge by mouth every 2 hours as needed for Sore Throat   SYNTHROID 150 MCG tablet   No

## 2025-06-25 NOTE — DISCHARGE INSTRUCTIONS
.                       [facility]  [facility]  Polly Roca MD  [phone]      June 25, 2025    April Mayo  YOB: 1955    COLONOSCOPY DISCHARGE INSTRUCTIONS    If there is redness at IV site you should apply warm compress to area.  If redness or soreness persist contact Dr. Roca's or your primary care doctor.    There may be a slight amount of blood passed from the rectum.  Gaseous discomfort may develop, but walking, belching will help relieve this.  You may not operate a vehicle for 12 hours  You may not operate machinery or dangerous appliances for rest of today  You may not drink alcoholic beverages for 12 hours  Avoid making any critical decisions for 24 hours    DIET:  You may resume your normal diet, but some patients find that heavy or large meals may lead to indigestion or vomiting.  I suggest a light meal as first food intake.    MEDICATIONS:  The use of some over-the-counter pain medication may lead to bleeding after colon biopsies or polyp removal.  Tylenol (also called acetaminophen) is safe to take even if you have had colonoscopy with polyp removal. .  Remember that Tylenol (also called acetaminophen) is safe to take after colonoscopy even if you have had biopsies or polyps removed.    ACTIVITY:  You may resume your normal household activities, but it is recommended that you spend the remainder of the day resting -  avoid any strenuous activity.    CALL DR. ROCA'S OFFICE IF:  Increasing pain, nausea, vomiting  Abdominal distension (swelling)  Significant new or increased bleeding (oral or rectal)  Fever/Chills  Chest pain/shortness of breath                       Additional instructions:   Colonoscopy to cecum-no polyps, adequate exam    Recommendations:   Resume preprocedure medications today  Resume preprocedure diet today  Given extensive comorbidities, further screening colonoscopies otherwise due in 10 years can be

## 2025-06-25 NOTE — ANESTHESIA PRE PROCEDURE
Department of Anesthesiology  Preprocedure Note       Name:  April Mayo   Age:  70 y.o.  :  1955                                          MRN:  [id number]         Date:  2025      Surgeon: Surgeon(s):  Polly Hernandez MD    Procedure: Procedure(s):  COLONOSCOPY    Medications prior to admission:   Prior to Admission medications    Medication Sig Start Date End Date Taking? Authorizing Provider   SYNTHROID 150 MCG tablet Take 1 tablet by mouth Daily 8/15/24  Yes Yudith Gomez MD   albuterol (PROVENTIL) (2.5 MG/3ML) 0.083% nebulizer solution Take 3 mLs by nebulization every 6 hours as needed for Wheezing   Yes Santa Ruffin MD   albuterol sulfate HFA (PROVENTIL;VENTOLIN;PROAIR) 108 (90 Base) MCG/ACT inhaler Inhale 2 puffs into the lungs every 6 hours as needed for Wheezing   Yes Santa Ruffin MD   Benzocaine-Menthol (CEPACOL) 6-10 MG LOZG lozenge Take 1 lozenge by mouth every 2 hours as needed for Sore Throat 3/25/24  Yes Alessandra Carpio MD   budesonide-formoterol (SYMBICORT) 160-4.5 MCG/ACT AERO Inhale 2 puffs into the lungs in the morning and 2 puffs in the evening. 3/20/24  Yes Alessandra Carpio MD   losartan (COZAAR) 50 MG tablet Take 1 tablet by mouth daily 3/21/24  Yes Alessandra Carpio MD   levocetirizine (XYZAL) 5 MG tablet Take 1 tablet by mouth daily   Yes Santa Ruffin MD   apixaban (ELIQUIS) 5 MG TABS tablet Take 2 tablets by mouth 2 times daily for 11 doses 24  Ermias Cordon PA-C   benzonatate (TESSALON) 100 MG capsule Take 1 capsule by mouth 3 times daily as needed for Cough  Patient not taking: Reported on 3/26/2025    Santa Ruffin MD   acetaminophen-codeine 120-12 MG/5ML solution Take 5 mLs by mouth every 6 hours as needed for Pain for up to 14 days. Max Daily Amount: 20 mLs 6/26/24 3/26/25  Gokul Dietz MD       Current medications:    Current Facility-Administered Medications   Medication Dose Route Frequency Provider Last

## 2025-06-25 NOTE — PROGRESS NOTES
Initial RN admission and assessment performed and documented in Endoscopy navigator.     Patient evaluated by anesthesia in pre-procedure holding.     All procedural vital signs, airway assessment, and level of consciousness information monitored and recorded by anesthesia staff on the anesthesia record.     Report received from CRNA post procedure.  Patient transported to recovery area by RN.    Endoscopy post procedure time out was performed and specimens were verified by physician.    Endoscope was pre-cleaned at bedside immediately following procedure by Niraj Vidal.

## 2025-06-25 NOTE — OP NOTE
.                       [facility]  [facility]  Polly Hernandez MD  [phone]      2025    Colonoscopy Procedure Note  April Mayo  :  1955  [place] Medical Record Number: [id number]    Indications:   Screening colonoscopy, average risk  PCP:  Lyly Bowman MD  Anesthesia/Sedation: Monitored anesthesia care, see separate note  Endoscopist:  Polly Hernandez MD   Complications:  None  Estimated Blood Loss:  None    Permit:  The indications, risks, benefits and alternatives were reviewed with the patient or their decision maker who was provided an opportunity to ask questions and all questions were answered.  The specific risks of colonoscopy with conscious sedation were reviewed, including but not limited to anesthetic complication, bleeding, adverse drug reaction, missed lesion, infection, IV site reactions, and intestinal perforation which would lead to the need for surgical repair.  Alternatives to colonoscopy including radiographic imaging, observation without testing, or laboratory testing were reviewed including the limitations of those alternatives.  After considering the options and having all their questions answered, the patient or their decision maker provided both verbal and written consent to proceed.        Procedure in Detail:  After obtaining informed consent, positioning of the patient in the left lateral decubitus position, and conduction of a pre-procedure pause or \"time out\" the endoscope was introduced into the anus and advanced to the cerum.  The quality of the colonic preparation was fair.  A careful inspection was made as the colonoscope was withdrawn, findings and interventions are described below.  After cleaning the colon, the [place] Bowel Prep score: 6  Right colon-2, transverse colon-2, left colon-2 after extensive cleaning    Findings:   Digital rectal exam-unremarkable  Cecum, colon, rectum-normal-appearing

## 2025-06-26 NOTE — ANESTHESIA POSTPROCEDURE EVALUATION
Department of Anesthesiology  Postprocedure Note    Patient: April Mayo  MRN: [id number]  YOB: 1955  Date of evaluation: 6/26/2025    Procedure Summary       Date: 06/25/25 Room / Location: [place] [id number] / [facility] ENDOSCOPY    Anesthesia Start: 1336 Anesthesia Stop: 1358    Procedure: COLONOSCOPY (Lower GI Region) Diagnosis:       Screen for colon cancer      (Screen for colon cancer [Z12.11])    Surgeons: Polly Hernandez MD Responsible Provider: Rodri Hernandez MD    Anesthesia Type: MAC ASA Status: 3            Anesthesia Type: No value filed.    Ena Phase I: Ena Score: 10    Ena Phase II: Ena Score: 10    Anesthesia Post Evaluation    Patient location during evaluation: PACU  Patient participation: complete - patient participated  Level of consciousness: awake  Airway patency: patent  Nausea & Vomiting: no vomiting and no nausea  Cardiovascular status: hemodynamically stable  Respiratory status: acceptable  Hydration status: stable  Pain management: adequate    No notable events documented.

## 2025-07-31 ENCOUNTER — PROCEDURE VISIT (OUTPATIENT)
Age: 70
End: 2025-07-31
Payer: MEDICARE

## 2025-07-31 ENCOUNTER — OFFICE VISIT (OUTPATIENT)
Age: 70
End: 2025-07-31
Payer: MEDICARE

## 2025-07-31 VITALS — OXYGEN SATURATION: 98 % | DIASTOLIC BLOOD PRESSURE: 80 MMHG | HEART RATE: 61 BPM | SYSTOLIC BLOOD PRESSURE: 142 MMHG

## 2025-07-31 DIAGNOSIS — Z43.0 TRACHEOSTOMY CARE (HCC): ICD-10-CM

## 2025-07-31 DIAGNOSIS — H90.3 SENSORINEURAL HEARING LOSS (SNHL) OF BOTH EARS: ICD-10-CM

## 2025-07-31 DIAGNOSIS — H90.3 SENSORINEURAL HEARING LOSS (SNHL), BILATERAL: Primary | ICD-10-CM

## 2025-07-31 DIAGNOSIS — C32.9 LARYNGEAL CARCINOMA (HCC): Primary | ICD-10-CM

## 2025-07-31 PROCEDURE — 99214 OFFICE O/P EST MOD 30 MIN: CPT | Performed by: STUDENT IN AN ORGANIZED HEALTH CARE EDUCATION/TRAINING PROGRAM

## 2025-07-31 PROCEDURE — 3079F DIAST BP 80-89 MM HG: CPT | Performed by: STUDENT IN AN ORGANIZED HEALTH CARE EDUCATION/TRAINING PROGRAM

## 2025-07-31 PROCEDURE — 1159F MED LIST DOCD IN RCRD: CPT | Performed by: STUDENT IN AN ORGANIZED HEALTH CARE EDUCATION/TRAINING PROGRAM

## 2025-07-31 PROCEDURE — 1126F AMNT PAIN NOTED NONE PRSNT: CPT | Performed by: STUDENT IN AN ORGANIZED HEALTH CARE EDUCATION/TRAINING PROGRAM

## 2025-07-31 PROCEDURE — 31575 DIAGNOSTIC LARYNGOSCOPY: CPT | Performed by: STUDENT IN AN ORGANIZED HEALTH CARE EDUCATION/TRAINING PROGRAM

## 2025-07-31 PROCEDURE — 3077F SYST BP >= 140 MM HG: CPT | Performed by: STUDENT IN AN ORGANIZED HEALTH CARE EDUCATION/TRAINING PROGRAM

## 2025-07-31 PROCEDURE — 92557 COMPREHENSIVE HEARING TEST: CPT

## 2025-07-31 PROCEDURE — 31615 TRCHEOBRNCHSC EST TRACHS INC: CPT | Performed by: STUDENT IN AN ORGANIZED HEALTH CARE EDUCATION/TRAINING PROGRAM

## 2025-07-31 PROCEDURE — 1123F ACP DISCUSS/DSCN MKR DOCD: CPT | Performed by: STUDENT IN AN ORGANIZED HEALTH CARE EDUCATION/TRAINING PROGRAM

## 2025-07-31 NOTE — PROGRESS NOTES
Subjective:   April Mayo   70 y.o.   1955     Refered by: None, None  No address on file     Consult Note:   Chief Compliant: Dysphonia     History of Present Illness:  April Mayo is a 70 y.o. female with past medical history of arthritis, graves disease s/p ablation, HTN, who was referred from Dr. Darek Moscoso for evaluation of dysphonia.     Patient was diagnosed with COPD 6 months ago and was started on an inhaler.  Since then patient has noted worsening dysphonia.  Denies any dysphagia or stridor.  Endorses shortness of breath.  Patient is a former smoker, less than 1 pack a day, but has been smoking for a long time.  Recently quit.    Additionally patient is concerned about her obstructive sleep apnea.  Had a home sleep test done, but it was inconclusive.      Patient follows with Dr. Gomez for Graves' disease status post ablation.  TSH is still suppressed but T4 is within normal limits    Interval Hx:   Patient was admitted for awake trach and biopsy, pathology was consistent with squamous cell carcinoma (3/14/24)    Patient completed concurrent chemoradiation in July 2024.  Post treatment PET shows interval resolution of laryngeal tumor.  Patient was having significant dysphagia after treatment.  Is now pegged and taking most of her nutrition via feeding tube.     3/26/25:   Patient is stable from prior.  Voice not significantly changed.  Tolerating p.o. well.  No evidence of disease on scope exam.  Tracheo-bronchoscopy within normal limits.  Significant laryngeal damage, not a candidate for decannulation at this time    7/31/25:   Exam is mildly improved from prior.  Dysphagia is improved, tolerating p.o. well.  No evidence of disease on scope exam.  Tracheo-bronchoscopy within normal limits.  Significant laryngeal damage, not a candidate for decannulation at this time    Additionally, patient has been complaining of hearing loss left greater than right.  Audiogram completed and

## 2025-07-31 NOTE — PROGRESS NOTES
April Mayo   1955, 70 y.o. female   [id number]       Referring Provider: Vaishali Pringle MD    AUDIOLOGIC EVALUATION      April Mayo is a 70 y.o. female seen today, 7/31/2025, for an audiologic evaluation. Patient was seen by otolaryngology following today's evaluation.    PERTINENT MEDICAL HISTORY:      April Mayo reports recent left hearing loss per otolaryngology report.    IMPRESSIONS:      Today's results revealed normal sloping to moderately-severe sensorineural hearing loss. Tympanometry was attempted but unable to be completed due to an inability to maintain a hermetic seal.    Follow medical recommendations of primary care physician and referring provider.    ASSESSMENT AND FINDINGS:     Otoscopy: Clear ear canals and normal tympanic membranes      RIGHT EAR:  Hearing Sensitivity: Normal sloping to moderately-severe sensorineural hearing loss  Speech Detection Threshold: 15 dB HL  Word Recognition: Could not test, patient unable to generate voiced utterances  Tympanometry: Attempted, could not maintain hermetic seal      LEFT EAR:  Hearing Sensitivity: Normal sloping to moderately-severe sensorineural hearing loss  Speech Detection Threshold: 10 dB  Word Recognition: Could not test, patient unable to generate voiced utterances  Tympanometry: Attempted, could not maintain hermetic seal      Reliability: Good   Transducer: Inserts    See scanned audiogram dated 7/31/2025 for results.        PATIENT EDUCATION:     The following items were discussed with the patient:  - Test results and implications  - Hearing loss  - Relevant hearing aid recommendations  - [facility]    Educational information was shared in the After Visit Summary.                                                RECOMMENDATIONS:

## 2025-08-18 ENCOUNTER — OFFICE VISIT (OUTPATIENT)
Age: 70
End: 2025-08-18
Payer: MEDICARE

## 2025-08-18 VITALS
SYSTOLIC BLOOD PRESSURE: 123 MMHG | OXYGEN SATURATION: 97 % | HEIGHT: 63 IN | HEART RATE: 69 BPM | TEMPERATURE: 98.7 F | BODY MASS INDEX: 46.87 KG/M2 | DIASTOLIC BLOOD PRESSURE: 64 MMHG | WEIGHT: 264.5 LBS

## 2025-08-18 DIAGNOSIS — E89.0 POSTABLATIVE HYPOTHYROIDISM: Primary | ICD-10-CM

## 2025-08-18 DIAGNOSIS — E66.9 NON MORBID OBESITY: ICD-10-CM

## 2025-08-18 DIAGNOSIS — E03.9 PRIMARY HYPOTHYROIDISM: ICD-10-CM

## 2025-08-18 PROCEDURE — 1159F MED LIST DOCD IN RCRD: CPT | Performed by: INTERNAL MEDICINE

## 2025-08-18 PROCEDURE — 1123F ACP DISCUSS/DSCN MKR DOCD: CPT | Performed by: INTERNAL MEDICINE

## 2025-08-18 PROCEDURE — 3074F SYST BP LT 130 MM HG: CPT | Performed by: INTERNAL MEDICINE

## 2025-08-18 PROCEDURE — 3078F DIAST BP <80 MM HG: CPT | Performed by: INTERNAL MEDICINE

## 2025-08-18 PROCEDURE — 99214 OFFICE O/P EST MOD 30 MIN: CPT | Performed by: INTERNAL MEDICINE

## 2025-08-18 PROCEDURE — 1125F AMNT PAIN NOTED PAIN PRSNT: CPT | Performed by: INTERNAL MEDICINE

## 2025-08-18 PROCEDURE — 1160F RVW MEDS BY RX/DR IN RCRD: CPT | Performed by: INTERNAL MEDICINE

## 2025-08-18 RX ORDER — FLUTICASONE PROPIONATE 50 MCG
1 SPRAY, SUSPENSION (ML) NASAL DAILY PRN
COMMUNITY
Start: 2025-03-18

## 2025-08-18 RX ORDER — LEVOTHYROXINE SODIUM 150 MCG
150 TABLET ORAL DAILY
Qty: 90 TABLET | Refills: 3 | Status: SHIPPED | OUTPATIENT
Start: 2025-08-18

## (undated) DEVICE — SYRINGE IRRIG 60ML SFT PLIABLE BLB EZ TO GRP 1 HND USE W/

## (undated) DEVICE — WET SKIN PREP TRAY: Brand: MEDLINE INDUSTRIES, INC.

## (undated) DEVICE — SPONGE,NEURO,0.5"X3",XR,STRL,LF,10/PK: Brand: MEDLINE

## (undated) DEVICE — SOLUTION IRRIG 500ML 0.9% SOD CHLO USP POUR PLAS BTL

## (undated) DEVICE — GLOVE ORANGE PI 7   MSG9070

## (undated) DEVICE — Device

## (undated) DEVICE — TUBE TRACH AD SZ 6 L77MM INNR CANN ID65MM OUTER CANN

## (undated) DEVICE — Device: Brand: JELCO

## (undated) DEVICE — BITE BLOCK ENDOSCP AD 60 FR W/ ADJ STRP PLAS GRN BLOX

## (undated) DEVICE — SHEET, DRAPE, SPLIT, STERILE: Brand: MEDLINE

## (undated) DEVICE — KIT ENDOSCOPIC  PROC VIA

## (undated) DEVICE — BLADE ES ELASTOMERIC COAT INSUL DURABLE BEND UPTO 90DEG

## (undated) DEVICE — ELECTRODE PT RET AD L9FT HI MOIST COND ADH HYDRGEL CORDED

## (undated) DEVICE — AGENT HEMOSTATIC SURG ORIGINAL ABS 4X8IN LOOSE KNIT 12/BX

## (undated) DEVICE — FORCEPS BIPOLAR 8.25IN .75MM STRAIGHT BAYONET

## (undated) DEVICE — MINOR GENERAL PACK: Brand: MEDLINE INDUSTRIES, INC.

## (undated) DEVICE — ENDOVIVE SFT PEG KIT PULL WENFIT 20F BX2

## (undated) DEVICE — SPONGE: SPECIALTY PEANUT XR 100/CS: Brand: MEDICAL ACTION INDUSTRIES

## (undated) DEVICE — SOUTHSIDE TURNOVER: Brand: MEDLINE INDUSTRIES, INC.

## (undated) DEVICE — BLADE,CARBON-STEEL,15,STRL,DISPOSABLE,TB: Brand: MEDLINE

## (undated) DEVICE — CORD BPLR 12FT SGL USE CLR

## (undated) DEVICE — GARMENT,MEDLINE,DVT,INT,CALF,MED, GEN2: Brand: MEDLINE